# Patient Record
Sex: FEMALE | Race: WHITE | NOT HISPANIC OR LATINO | Employment: OTHER | URBAN - NONMETROPOLITAN AREA
[De-identification: names, ages, dates, MRNs, and addresses within clinical notes are randomized per-mention and may not be internally consistent; named-entity substitution may affect disease eponyms.]

---

## 2017-01-01 ENCOUNTER — APPOINTMENT (OUTPATIENT)
Dept: CT IMAGING | Facility: HOSPITAL | Age: 77
End: 2017-01-01

## 2017-01-01 ENCOUNTER — TELEPHONE (OUTPATIENT)
Dept: ONCOLOGY | Facility: HOSPITAL | Age: 77
End: 2017-01-01

## 2017-01-01 ENCOUNTER — INFUSION (OUTPATIENT)
Dept: ONCOLOGY | Facility: HOSPITAL | Age: 77
End: 2017-01-01

## 2017-01-01 ENCOUNTER — OFFICE VISIT (OUTPATIENT)
Dept: ONCOLOGY | Facility: CLINIC | Age: 77
End: 2017-01-01

## 2017-01-01 ENCOUNTER — OFFICE VISIT (OUTPATIENT)
Dept: SURGERY | Facility: CLINIC | Age: 77
End: 2017-01-01

## 2017-01-01 ENCOUNTER — APPOINTMENT (OUTPATIENT)
Dept: ONCOLOGY | Facility: HOSPITAL | Age: 77
End: 2017-01-01

## 2017-01-01 ENCOUNTER — HOSPITAL ENCOUNTER (INPATIENT)
Facility: HOSPITAL | Age: 77
LOS: 15 days | End: 2017-03-18
Attending: INTERNAL MEDICINE | Admitting: HOSPITALIST

## 2017-01-01 ENCOUNTER — TELEPHONE (OUTPATIENT)
Dept: ONCOLOGY | Facility: CLINIC | Age: 77
End: 2017-01-01

## 2017-01-01 ENCOUNTER — OFFICE VISIT (OUTPATIENT)
Dept: ENDOCRINOLOGY | Facility: CLINIC | Age: 77
End: 2017-01-01

## 2017-01-01 ENCOUNTER — HOSPITAL ENCOUNTER (OUTPATIENT)
Dept: OTHER | Facility: HOSPITAL | Age: 77
Discharge: HOME | End: 2017-01-12

## 2017-01-01 ENCOUNTER — TELEPHONE (OUTPATIENT)
Dept: NUTRITION | Facility: HOSPITAL | Age: 77
End: 2017-01-01

## 2017-01-01 ENCOUNTER — HOSPITAL ENCOUNTER (OUTPATIENT)
Dept: OTHER | Facility: HOSPITAL | Age: 77
Setting detail: RADIATION/ONCOLOGY SERIES
Discharge: HOME | End: 2017-01-20
Attending: INTERNAL MEDICINE | Admitting: INTERNAL MEDICINE

## 2017-01-01 ENCOUNTER — APPOINTMENT (OUTPATIENT)
Dept: GENERAL RADIOLOGY | Facility: HOSPITAL | Age: 77
End: 2017-01-01

## 2017-01-01 ENCOUNTER — HOSPITAL ENCOUNTER (INPATIENT)
Facility: HOSPITAL | Age: 77
LOS: 1 days | Discharge: HOSPICE | End: 2017-03-03
Attending: EMERGENCY MEDICINE | Admitting: INTERNAL MEDICINE

## 2017-01-01 VITALS
SYSTOLIC BLOOD PRESSURE: 130 MMHG | HEIGHT: 65 IN | BODY MASS INDEX: 36.65 KG/M2 | WEIGHT: 220 LBS | DIASTOLIC BLOOD PRESSURE: 60 MMHG

## 2017-01-01 VITALS
RESPIRATION RATE: 20 BRPM | SYSTOLIC BLOOD PRESSURE: 112 MMHG | BODY MASS INDEX: 34.01 KG/M2 | HEIGHT: 65 IN | TEMPERATURE: 97 F | WEIGHT: 204.1 LBS | HEART RATE: 84 BPM | DIASTOLIC BLOOD PRESSURE: 65 MMHG

## 2017-01-01 VITALS
HEART RATE: 83 BPM | SYSTOLIC BLOOD PRESSURE: 141 MMHG | HEIGHT: 65 IN | WEIGHT: 202 LBS | OXYGEN SATURATION: 91 % | RESPIRATION RATE: 18 BRPM | TEMPERATURE: 97.3 F | BODY MASS INDEX: 33.66 KG/M2 | DIASTOLIC BLOOD PRESSURE: 64 MMHG

## 2017-01-01 VITALS
WEIGHT: 216.5 LBS | HEART RATE: 186 BPM | BODY MASS INDEX: 36.03 KG/M2 | DIASTOLIC BLOOD PRESSURE: 67 MMHG | RESPIRATION RATE: 28 BRPM | OXYGEN SATURATION: 76 % | SYSTOLIC BLOOD PRESSURE: 136 MMHG | TEMPERATURE: 101.6 F

## 2017-01-01 VITALS
HEART RATE: 79 BPM | WEIGHT: 212 LBS | RESPIRATION RATE: 18 BRPM | DIASTOLIC BLOOD PRESSURE: 62 MMHG | BODY MASS INDEX: 35.32 KG/M2 | TEMPERATURE: 96.9 F | SYSTOLIC BLOOD PRESSURE: 111 MMHG | HEIGHT: 65 IN

## 2017-01-01 VITALS
HEIGHT: 65 IN | WEIGHT: 213.7 LBS | BODY MASS INDEX: 35.6 KG/M2 | DIASTOLIC BLOOD PRESSURE: 82 MMHG | SYSTOLIC BLOOD PRESSURE: 142 MMHG | HEART RATE: 84 BPM

## 2017-01-01 VITALS
TEMPERATURE: 96.6 F | WEIGHT: 215 LBS | SYSTOLIC BLOOD PRESSURE: 137 MMHG | HEIGHT: 65 IN | HEART RATE: 74 BPM | DIASTOLIC BLOOD PRESSURE: 60 MMHG | RESPIRATION RATE: 16 BRPM | BODY MASS INDEX: 35.82 KG/M2

## 2017-01-01 VITALS
WEIGHT: 213 LBS | HEIGHT: 65 IN | SYSTOLIC BLOOD PRESSURE: 124 MMHG | DIASTOLIC BLOOD PRESSURE: 60 MMHG | BODY MASS INDEX: 35.49 KG/M2

## 2017-01-01 VITALS
DIASTOLIC BLOOD PRESSURE: 63 MMHG | RESPIRATION RATE: 18 BRPM | SYSTOLIC BLOOD PRESSURE: 129 MMHG | HEART RATE: 83 BPM | TEMPERATURE: 97.1 F

## 2017-01-01 VITALS
BODY MASS INDEX: 33.75 KG/M2 | WEIGHT: 202.8 LBS | DIASTOLIC BLOOD PRESSURE: 63 MMHG | SYSTOLIC BLOOD PRESSURE: 122 MMHG | HEART RATE: 87 BPM | TEMPERATURE: 96.6 F | RESPIRATION RATE: 18 BRPM

## 2017-01-01 DIAGNOSIS — C18.3 MALIGNANT NEOPLASM OF HEPATIC FLEXURE (HCC): ICD-10-CM

## 2017-01-01 DIAGNOSIS — C18.3 MALIGNANT NEOPLASM OF HEPATIC FLEXURE (HCC): Primary | ICD-10-CM

## 2017-01-01 DIAGNOSIS — K92.2 LOWER GI BLEED: Primary | ICD-10-CM

## 2017-01-01 DIAGNOSIS — Z85.038 HISTORY OF MALIGNANT NEOPLASM OF COLON: ICD-10-CM

## 2017-01-01 DIAGNOSIS — T45.1X5A ANEMIA ASSOCIATED WITH CHEMOTHERAPY: ICD-10-CM

## 2017-01-01 DIAGNOSIS — R11.2 NON-INTRACTABLE VOMITING WITH NAUSEA, UNSPECIFIED VOMITING TYPE: ICD-10-CM

## 2017-01-01 DIAGNOSIS — C18.9 METASTATIC COLON CANCER IN FEMALE: ICD-10-CM

## 2017-01-01 DIAGNOSIS — E03.8 HYPOTHYROIDISM DUE TO HASHIMOTO'S THYROIDITIS: Primary | ICD-10-CM

## 2017-01-01 DIAGNOSIS — R11.2 NAUSEA AND VOMITING, INTRACTABILITY OF VOMITING NOT SPECIFIED, UNSPECIFIED VOMITING TYPE: Primary | ICD-10-CM

## 2017-01-01 DIAGNOSIS — C18.2 PRIMARY MALIGNANT NEOPLASM OF ASCENDING COLON (HCC): Primary | ICD-10-CM

## 2017-01-01 DIAGNOSIS — R11.2 NAUSEA AND VOMITING, INTRACTABILITY OF VOMITING NOT SPECIFIED, UNSPECIFIED VOMITING TYPE: ICD-10-CM

## 2017-01-01 DIAGNOSIS — E87.6 HYPOKALEMIA: ICD-10-CM

## 2017-01-01 DIAGNOSIS — Z78.9 IMPAIRED MOBILITY AND ACTIVITIES OF DAILY LIVING: ICD-10-CM

## 2017-01-01 DIAGNOSIS — R22.2 ABDOMINAL WALL MASS: Primary | ICD-10-CM

## 2017-01-01 DIAGNOSIS — E06.3 HYPOTHYROIDISM DUE TO HASHIMOTO'S THYROIDITIS: Primary | ICD-10-CM

## 2017-01-01 DIAGNOSIS — C18.9 METASTATIC COLON CANCER IN FEMALE: Primary | ICD-10-CM

## 2017-01-01 DIAGNOSIS — D64.81 ANEMIA ASSOCIATED WITH CHEMOTHERAPY: ICD-10-CM

## 2017-01-01 DIAGNOSIS — Z74.09 IMPAIRED MOBILITY AND ACTIVITIES OF DAILY LIVING: ICD-10-CM

## 2017-01-01 DIAGNOSIS — R53.1 GENERAL WEAKNESS: ICD-10-CM

## 2017-01-01 LAB
ALBUMIN SERPL-MCNC: 2.9 G/DL (ref 3.4–4.8)
ALBUMIN SERPL-MCNC: 2.9 G/DL (ref 3.4–4.8)
ALBUMIN SERPL-MCNC: 3.3 G/DL (ref 3.4–4.8)
ALBUMIN SERPL-MCNC: 3.9 G/DL (ref 3.4–4.8)
ALBUMIN SERPL-MCNC: 4 G/DL (ref 3.4–4.8)
ALBUMIN SERPL-MCNC: 4.1 G/DL (ref 3.4–4.8)
ALBUMIN SERPL-MCNC: 4.1 GM/DL (ref 3.4–4.8)
ALBUMIN SERPL-MCNC: 4.3 G/DL (ref 3.4–4.8)
ALBUMIN/GLOB SERPL: 1.2 G/DL (ref 1.1–1.8)
ALBUMIN/GLOB SERPL: 1.4 G/DL (ref 1.1–1.8)
ALBUMIN/GLOB SERPL: 1.4 G/DL (ref 1.1–1.8)
ALBUMIN/GLOB SERPL: 1.5 G/DL (ref 1.1–1.8)
ALBUMIN/GLOB SERPL: 1.6 G/DL (ref 1.1–1.8)
ALBUMIN/GLOB SERPL: 1.6 G/DL (ref 1.1–1.8)
ALBUMIN/GLOB SERPL: 1.7 G/DL (ref 1.1–1.8)
ALP SERPL-CCNC: 101 U/L (ref 38–126)
ALP SERPL-CCNC: 115 U/L (ref 38–126)
ALP SERPL-CCNC: 120 U/L (ref 38–126)
ALP SERPL-CCNC: 130 U/L (ref 38–126)
ALP SERPL-CCNC: 66 U/L (ref 38–126)
ALP SERPL-CCNC: 73 U/L (ref 38–126)
ALP SERPL-CCNC: 88 U/L (ref 38–126)
ALP SERPL-CCNC: 97 U/L (ref 38–126)
ALT SERPL W P-5'-P-CCNC: 22 U/L (ref 9–52)
ALT SERPL W P-5'-P-CCNC: 23 U/L (ref 9–52)
ALT SERPL W P-5'-P-CCNC: 25 U/L (ref 9–52)
ALT SERPL W P-5'-P-CCNC: 27 U/L (ref 9–52)
ALT SERPL W P-5'-P-CCNC: 28 U/L (ref 9–52)
ALT SERPL W P-5'-P-CCNC: 30 U/L (ref 9–52)
ALT SERPL W P-5'-P-CCNC: 33 U/L (ref 9–52)
ALT SERPL-CCNC: 37 U/L (ref 9–52)
ANION GAP SERPL CALCULATED.3IONS-SCNC: 11 MMOL/L (ref 5–15)
ANION GAP SERPL CALCULATED.3IONS-SCNC: 11 MMOL/L (ref 5–15)
ANION GAP SERPL CALCULATED.3IONS-SCNC: 12 MMOL/L (ref 5–15)
ANION GAP SERPL CALCULATED.3IONS-SCNC: 13 MMOL/L (ref 5–15)
ANION GAP SERPL CALCULATED.3IONS-SCNC: 13 MMOL/L (ref 5–15)
ANION GAP SERPL CALCULATED.3IONS-SCNC: 15 MMOL/L (ref 5–15)
ANION GAP SERPL CALCULATED.3IONS-SCNC: 5 MMOL/L (ref 5–15)
ANION GAP SERPL CALCULATED.3IONS-SCNC: 7 MMOL/L (ref 5–15)
ANION GAP SERPL CALCULATED.3IONS-SCNC: 8 MMOL/L (ref 5–15)
ANISOCYTOSIS BLD QL: NORMAL
AST SERPL-CCNC: 15 U/L (ref 14–36)
AST SERPL-CCNC: 18 U/L (ref 14–36)
AST SERPL-CCNC: 19 U/L (ref 14–36)
AST SERPL-CCNC: 19 U/L (ref 14–36)
AST SERPL-CCNC: 24 U/L (ref 14–36)
AST SERPL-CCNC: 25 U/L (ref 14–36)
AST SERPL-CCNC: 25 U/L (ref 14–36)
AST SERPL-CCNC: 31 U/L (ref 14–36)
BACTERIA SPEC AEROBE CULT: NORMAL
BACTERIA UR QL AUTO: ABNORMAL /HPF
BASOPHILS # BLD AUTO: 0.01 10*3/MM3 (ref 0–0.2)
BASOPHILS # BLD AUTO: 0.01 10*3/MM3 (ref 0–0.2)
BASOPHILS # BLD AUTO: 0.02 10*3/MM3 (ref 0–0.2)
BASOPHILS # BLD AUTO: 0.03 10*3/MM3 (ref 0–0.2)
BASOPHILS # BLD AUTO: 0.03 10*3/MM3 (ref 0–0.2)
BASOPHILS # BLD AUTO: 0.04 10*3/MM3 (ref 0–0.2)
BASOPHILS # BLD AUTO: 0.05 10*3/MM3 (ref 0–0.2)
BASOPHILS # BLD AUTO: 0.05 10*3/MM3 (ref 0–0.2)
BASOPHILS # BLD AUTO: 0.05 X1000/UL (ref 0–0.2)
BASOPHILS NFR BLD AUTO: 0.1 % (ref 0–2)
BASOPHILS NFR BLD AUTO: 0.1 % (ref 0–2)
BASOPHILS NFR BLD AUTO: 0.2 % (ref 0–2)
BASOPHILS NFR BLD AUTO: 0.3 % (ref 0–2)
BASOPHILS NFR BLD AUTO: 0.3 % (ref 0–2)
BASOPHILS NFR BLD AUTO: 0.5 % (ref 0–2)
BILIRUB SERPL-MCNC: 0.3 MG/DL (ref 0.2–1.3)
BILIRUB SERPL-MCNC: 0.5 MG/DL (ref 0.2–1.3)
BILIRUB SERPL-MCNC: 0.6 MG/DL (ref 0.2–1.3)
BILIRUB SERPL-MCNC: 0.7 MG/DL (ref 0.2–1.3)
BILIRUB SERPL-MCNC: 0.8 MG/DL (ref 0.2–1.3)
BILIRUB UR QL STRIP: ABNORMAL
BUN BLD-MCNC: 13 MG/DL (ref 7–21)
BUN BLD-MCNC: 13 MG/DL (ref 7–21)
BUN BLD-MCNC: 19 MG/DL (ref 7–21)
BUN BLD-MCNC: 20 MG/DL (ref 7–21)
BUN BLD-MCNC: 24 MG/DL (ref 7–21)
BUN BLD-MCNC: 25 MG/DL (ref 7–21)
BUN BLD-MCNC: 8 MG/DL (ref 7–21)
BUN BLD-MCNC: 9 MG/DL (ref 7–21)
BUN SERPL-MCNC: 14 MG/DL (ref 7–21)
BUN SERPL-MCNC: 15 MG/DL (ref 7–21)
BUN/CREAT SERPL: 14.1 (ref 7–25)
BUN/CREAT SERPL: 14.8 (ref 7–25)
BUN/CREAT SERPL: 16.3 (ref 7–25)
BUN/CREAT SERPL: 16.5 (ref 7–25)
BUN/CREAT SERPL: 27.5 (ref 7–25)
BUN/CREAT SERPL: 28.6 (ref 7–25)
BUN/CREAT SERPL: 28.6 (ref 7–25)
BUN/CREAT SERPL: 33.3 (ref 7–25)
CALCIUM SERPL-MCNC: 9.4 MG/DL (ref 8.4–10.2)
CALCIUM SPEC-SCNC: 8.2 MG/DL (ref 8.4–10.2)
CALCIUM SPEC-SCNC: 8.4 MG/DL (ref 8.4–10.2)
CALCIUM SPEC-SCNC: 8.6 MG/DL (ref 8.4–10.2)
CALCIUM SPEC-SCNC: 8.7 MG/DL (ref 8.4–10.2)
CALCIUM SPEC-SCNC: 9.3 MG/DL (ref 8.4–10.2)
CALCIUM SPEC-SCNC: 9.4 MG/DL (ref 8.4–10.2)
CEA SERPL-MCNC: 11.7 NG/ML (ref 0–5)
CHLORIDE SERPL-SCNC: 103 MMOL/L (ref 95–110)
CHLORIDE SERPL-SCNC: 103 MMOL/L (ref 95–110)
CHLORIDE SERPL-SCNC: 91 MMOL/L (ref 95–110)
CHLORIDE SERPL-SCNC: 92 MMOL/L (ref 95–110)
CHLORIDE SERPL-SCNC: 93 MMOL/L (ref 95–110)
CHLORIDE SERPL-SCNC: 94 MMOL/L (ref 95–110)
CHLORIDE SERPL-SCNC: 96 MMOL/L (ref 95–110)
CHLORIDE SERPL-SCNC: 97 MMOL/L (ref 95–110)
CHLORIDE SERPL-SCNC: 98 MMOL/L (ref 95–110)
CLARITY UR: CLEAR
CO2 SERPL-SCNC: 24 MMOL/L (ref 22–31)
CO2 SERPL-SCNC: 24 MMOL/L (ref 22–31)
CO2 SERPL-SCNC: 26 MMOL/L (ref 22–31)
CO2 SERPL-SCNC: 27 MMOL/L (ref 22–31)
CO2 SERPL-SCNC: 28 MMOL/L (ref 22–31)
CO2 SERPL-SCNC: 30 MMOL/L (ref 22–31)
CO2 SERPL-SCNC: 31 MMOL/L (ref 22–31)
COLOR UR: ABNORMAL
CONV AUTO IG#: 0.05 X1000/UL (ref 0.01–0.02)
CONV AUTO IG%: 0.5 % (ref 0–0.5)
CREAT BLD-MCNC: 0.54 MG/DL (ref 0.5–1)
CREAT BLD-MCNC: 0.64 MG/DL (ref 0.5–1)
CREAT BLD-MCNC: 0.69 MG/DL (ref 0.5–1)
CREAT BLD-MCNC: 0.7 MG/DL (ref 0.5–1)
CREAT BLD-MCNC: 0.75 MG/DL (ref 0.5–1)
CREAT BLD-MCNC: 0.79 MG/DL (ref 0.5–1)
CREAT BLD-MCNC: 0.8 MG/DL (ref 0.5–1)
CREAT BLD-MCNC: 0.84 MG/DL (ref 0.5–1)
CREAT SERPL-MCNC: 1 MG/DL (ref 0.5–1)
D-LACTATE SERPL-SCNC: 1 MMOL/L (ref 0.5–2)
DEPRECATED RDW RBC AUTO: 44.2 FL (ref 36.4–46.3)
DEPRECATED RDW RBC AUTO: 44.5 FL (ref 36.4–46.3)
DEPRECATED RDW RBC AUTO: 45.2 FL (ref 36.4–46.3)
DEPRECATED RDW RBC AUTO: 45.4 FL (ref 36.4–46.3)
DEPRECATED RDW RBC AUTO: 45.5 FL (ref 36.4–46.3)
DEPRECATED RDW RBC AUTO: 45.9 FL (ref 36.4–46.3)
DEPRECATED RDW RBC AUTO: 46.8 FL (ref 36.4–46.3)
DEPRECATED RDW RBC AUTO: 47.2 FL (ref 36.4–46.3)
EOSINOPHIL # BLD AUTO: 0.12 10*3/MM3 (ref 0–0.7)
EOSINOPHIL # BLD AUTO: 0.13 10*3/MM3 (ref 0–0.7)
EOSINOPHIL # BLD AUTO: 0.19 10*3/MM3 (ref 0–0.7)
EOSINOPHIL # BLD AUTO: 0.25 10*3/MM3 (ref 0–0.7)
EOSINOPHIL # BLD AUTO: 0.25 10*3/MM3 (ref 0–0.7)
EOSINOPHIL # BLD AUTO: 0.26 10*3/MM3 (ref 0–0.7)
EOSINOPHIL # BLD AUTO: 0.44 X1000/UL (ref 0–0.7)
EOSINOPHIL # BLD AUTO: 0.45 10*3/MM3 (ref 0–0.7)
EOSINOPHIL # BLD AUTO: 0.48 10*3/MM3 (ref 0–0.7)
EOSINOPHIL NFR BLD AUTO: 1.1 % (ref 0–7)
EOSINOPHIL NFR BLD AUTO: 1.5 % (ref 0–7)
EOSINOPHIL NFR BLD AUTO: 1.6 % (ref 0–7)
EOSINOPHIL NFR BLD AUTO: 2.1 % (ref 0–7)
EOSINOPHIL NFR BLD AUTO: 2.5 % (ref 0–7)
EOSINOPHIL NFR BLD AUTO: 3.8 % (ref 0–7)
EOSINOPHIL NFR BLD AUTO: 4.3 % (ref 0–7)
EOSINOPHIL NFR BLD AUTO: 4.4 % (ref 0–7)
EOSINOPHIL NFR BLD AUTO: 5.5 % (ref 0–7)
ERYTHROCYTE [DISTWIDTH] IN BLOOD BY AUTOMATED COUNT: 14.8 % (ref 11.5–14.5)
ERYTHROCYTE [DISTWIDTH] IN BLOOD BY AUTOMATED COUNT: 14.9 % (ref 11.5–14.5)
ERYTHROCYTE [DISTWIDTH] IN BLOOD BY AUTOMATED COUNT: 15.1 % (ref 11.5–14.5)
ERYTHROCYTE [DISTWIDTH] IN BLOOD BY AUTOMATED COUNT: 15.1 % (ref 11.5–14.5)
ERYTHROCYTE [DISTWIDTH] IN BLOOD BY AUTOMATED COUNT: 15.2 % (ref 11.5–14.5)
ERYTHROCYTE [DISTWIDTH] IN BLOOD BY AUTOMATED COUNT: 15.2 % (ref 11.5–14.5)
ERYTHROCYTE [DISTWIDTH] IN BLOOD BY AUTOMATED COUNT: 15.3 % (ref 11.5–14.5)
ERYTHROCYTE [DISTWIDTH] IN BLOOD BY AUTOMATED COUNT: 15.3 % (ref 11.5–14.5)
ERYTHROCYTE [DISTWIDTH] IN BLOOD: 15.2 % (ref 11.5–14.5)
FERRITIN SERPL IA-MCNC: 43.1 NG/ML (ref 11.1–264)
FERRITIN SERPL-MCNC: 51.2 NG/ML (ref 11.1–264)
FLUAV AG NPH QL: NEGATIVE
FLUBV AG NPH QL IA: NEGATIVE
FOLATE SERPL-MCNC: 9.95 NG/ML (ref 2.76–21)
GFR SERPL CREATININE-BSD FRML MDRD: 110 ML/MIN/1.73 (ref 60–90)
GFR SERPL CREATININE-BSD FRML MDRD: 66 ML/MIN/1.73 (ref 39–90)
GFR SERPL CREATININE-BSD FRML MDRD: 70 ML/MIN/1.73 (ref 60–90)
GFR SERPL CREATININE-BSD FRML MDRD: 71 ML/MIN/1.73 (ref 39–90)
GFR SERPL CREATININE-BSD FRML MDRD: 75 ML/MIN/1.73 (ref 39–90)
GFR SERPL CREATININE-BSD FRML MDRD: 82 ML/MIN/1.73 (ref 60–90)
GFR SERPL CREATININE-BSD FRML MDRD: 83 ML/MIN/1.73 (ref 60–90)
GFR SERPL CREATININE-BSD FRML MDRD: 90 ML/MIN/1.73 (ref 39–90)
GLOBULIN UR ELPH-MCNC: 2 GM/DL (ref 2.3–3.5)
GLOBULIN UR ELPH-MCNC: 2.1 GM/DL (ref 2.3–3.5)
GLOBULIN UR ELPH-MCNC: 2.4 GM/DL (ref 2.3–3.5)
GLOBULIN UR ELPH-MCNC: 2.5 GM/DL (ref 2.3–3.5)
GLOBULIN UR ELPH-MCNC: 2.5 GM/DL (ref 2.3–3.5)
GLOBULIN UR ELPH-MCNC: 2.7 GM/DL (ref 2.3–3.5)
GLOBULIN UR ELPH-MCNC: 2.9 GM/DL (ref 2.3–3.5)
GLUCOSE BLD-MCNC: 118 MG/DL (ref 60–100)
GLUCOSE BLD-MCNC: 133 MG/DL (ref 60–100)
GLUCOSE BLD-MCNC: 134 MG/DL (ref 60–100)
GLUCOSE BLD-MCNC: 150 MG/DL (ref 60–100)
GLUCOSE BLD-MCNC: 82 MG/DL (ref 60–100)
GLUCOSE BLD-MCNC: 82 MG/DL (ref 60–100)
GLUCOSE BLD-MCNC: 90 MG/DL (ref 60–100)
GLUCOSE BLD-MCNC: 97 MG/DL (ref 60–100)
GLUCOSE BLDC GLUCOMTR-MCNC: 122 MG/DL (ref 70–130)
GLUCOSE SERPL-MCNC: 102 MG/DL (ref 60–100)
GLUCOSE UR STRIP-MCNC: NEGATIVE MG/DL
GRANULOCYTES # BLD AUTO: 6.86 X1000/UL (ref 2–8.6)
GRANULOCYTES NFR BLD AUTO: 66.4 % (ref 37–80)
HCT VFR BLD AUTO: 25.4 % (ref 35–45)
HCT VFR BLD AUTO: 26 % (ref 35–45)
HCT VFR BLD AUTO: 27.2 % (ref 35–45)
HCT VFR BLD AUTO: 27.2 % (ref 35–45)
HCT VFR BLD AUTO: 27.4 % (ref 35–45)
HCT VFR BLD AUTO: 27.5 % (ref 35–45)
HCT VFR BLD AUTO: 31.6 % (ref 35–45)
HCT VFR BLD AUTO: 35.2 % (ref 35–45)
HCT VFR BLD AUTO: 35.7 % (ref 35–45)
HCT VFR BLD AUTO: 35.8 % (ref 35–45)
HCT VFR BLD AUTO: 36.6 % (ref 35–45)
HCT VFR BLD CALC: 36.2 % (ref 35–45)
HGB BLD-MCNC: 10.8 G/DL (ref 12–15.5)
HGB BLD-MCNC: 11.9 G/DL (ref 12–15.5)
HGB BLD-MCNC: 12 G/DL (ref 12–15.5)
HGB BLD-MCNC: 12.1 G/DL (ref 12–15.5)
HGB BLD-MCNC: 12.1 G/DL (ref 12–15.5)
HGB BLD-MCNC: 12.2 GM/DL (ref 12–15.5)
HGB BLD-MCNC: 8.7 G/DL (ref 12–15.5)
HGB BLD-MCNC: 8.7 G/DL (ref 12–15.5)
HGB BLD-MCNC: 9.1 G/DL (ref 12–15.5)
HGB BLD-MCNC: 9.3 G/DL (ref 12–15.5)
HGB UR QL STRIP.AUTO: NEGATIVE
HOLD SPECIMEN: NORMAL
HOLD SPECIMEN: NORMAL
HYALINE CASTS UR QL AUTO: ABNORMAL /LPF
HYPOCHROMIA BLD QL: NORMAL
IMM GRANULOCYTES # BLD: 0.02 10*3/MM3 (ref 0–0.02)
IMM GRANULOCYTES # BLD: 0.04 10*3/MM3 (ref 0–0.02)
IMM GRANULOCYTES # BLD: 0.08 10*3/MM3 (ref 0–0.02)
IMM GRANULOCYTES # BLD: 0.08 10*3/MM3 (ref 0–0.02)
IMM GRANULOCYTES NFR BLD: 0.2 % (ref 0–0.5)
IMM GRANULOCYTES NFR BLD: 0.4 % (ref 0–0.5)
IMM GRANULOCYTES NFR BLD: 0.4 % (ref 0–0.5)
IMM GRANULOCYTES NFR BLD: 0.5 % (ref 0–0.5)
IMM GRANULOCYTES NFR BLD: 0.6 % (ref 0–0.5)
IMM GRANULOCYTES NFR BLD: 0.7 % (ref 0–0.5)
INR PPP: 1.26 (ref 0.8–1.2)
IRON 24H UR-MRATE: 16 MCG/DL (ref 37–170)
IRON SATN MFR SERPL: 11.7 % (ref 15–50)
IRON SATN MFR SERPL: 6 % (ref 15–50)
IRON SERPL-MCNC: 42 UG/DL (ref 37–170)
KETONES UR QL STRIP: NEGATIVE
LEUKOCYTE ESTERASE UR QL STRIP.AUTO: ABNORMAL
LYMPHOCYTES # BLD AUTO: 0.84 10*3/MM3 (ref 0.6–4.2)
LYMPHOCYTES # BLD AUTO: 1.13 10*3/MM3 (ref 0.6–4.2)
LYMPHOCYTES # BLD AUTO: 1.15 10*3/MM3 (ref 0.6–4.2)
LYMPHOCYTES # BLD AUTO: 1.32 10*3/MM3 (ref 0.6–4.2)
LYMPHOCYTES # BLD AUTO: 1.42 10*3/MM3 (ref 0.6–4.2)
LYMPHOCYTES # BLD AUTO: 1.46 10*3/MM3 (ref 0.6–4.2)
LYMPHOCYTES # BLD AUTO: 1.51 10*3/MM3 (ref 0.6–4.2)
LYMPHOCYTES # BLD AUTO: 1.68 10*3/MM3 (ref 0.6–4.2)
LYMPHOCYTES # BLD AUTO: 1.87 X1000/UL (ref 0.6–4.2)
LYMPHOCYTES NFR BLD AUTO: 10.7 % (ref 10–50)
LYMPHOCYTES NFR BLD AUTO: 12.4 % (ref 10–50)
LYMPHOCYTES NFR BLD AUTO: 14.3 % (ref 10–50)
LYMPHOCYTES NFR BLD AUTO: 15.2 % (ref 10–50)
LYMPHOCYTES NFR BLD AUTO: 16.1 % (ref 10–50)
LYMPHOCYTES NFR BLD AUTO: 16.2 % (ref 10–50)
LYMPHOCYTES NFR BLD AUTO: 17 % (ref 10–50)
LYMPHOCYTES NFR BLD AUTO: 18.1 % (ref 10–50)
LYMPHOCYTES NFR BLD AUTO: 7.4 % (ref 10–50)
MCH RBC QN AUTO: 27.8 PG (ref 26.5–34)
MCH RBC QN AUTO: 27.9 PG (ref 26.5–34)
MCH RBC QN AUTO: 28.1 PG (ref 26.5–34)
MCH RBC QN AUTO: 28.3 PG (ref 26.5–34)
MCH RBC QN: 27.9 PG (ref 26–34)
MCHC RBC AUTO-ENTMCNC: 33.1 G/DL (ref 31.4–36)
MCHC RBC AUTO-ENTMCNC: 33.2 G/DL (ref 31.4–36)
MCHC RBC AUTO-ENTMCNC: 33.6 G/DL (ref 31.4–36)
MCHC RBC AUTO-ENTMCNC: 33.8 G/DL (ref 31.4–36)
MCHC RBC AUTO-ENTMCNC: 33.8 G/DL (ref 31.4–36)
MCHC RBC AUTO-ENTMCNC: 34.2 G/DL (ref 31.4–36)
MCHC RBC AUTO-ENTMCNC: 34.2 G/DL (ref 31.4–36)
MCHC RBC AUTO-ENTMCNC: 34.3 G/DL (ref 31.4–36)
MCHC RBC-ENTMCNC: 33.7 GM/DL (ref 31.4–36)
MCV RBC AUTO: 81.4 FL (ref 80–98)
MCV RBC AUTO: 81.4 FL (ref 80–98)
MCV RBC AUTO: 82.4 FL (ref 80–98)
MCV RBC AUTO: 82.5 FL (ref 80–98)
MCV RBC AUTO: 82.7 FL (ref 80–98)
MCV RBC AUTO: 83.6 FL (ref 80–98)
MCV RBC AUTO: 83.9 FL (ref 80–98)
MCV RBC AUTO: 84 FL (ref 80–98)
MCV RBC: 82.8 FL (ref 80–98)
MONOCYTES # BLD AUTO: 0.75 10*3/MM3 (ref 0–0.9)
MONOCYTES # BLD AUTO: 0.79 10*3/MM3 (ref 0–0.9)
MONOCYTES # BLD AUTO: 0.86 10*3/MM3 (ref 0–0.9)
MONOCYTES # BLD AUTO: 0.89 10*3/MM3 (ref 0–0.9)
MONOCYTES # BLD AUTO: 1.05 X1000/UL (ref 0–0.9)
MONOCYTES # BLD AUTO: 1.16 10*3/MM3 (ref 0–0.9)
MONOCYTES # BLD AUTO: 1.27 10*3/MM3 (ref 0–0.9)
MONOCYTES # BLD AUTO: 1.35 10*3/MM3 (ref 0–0.9)
MONOCYTES # BLD AUTO: 1.54 10*3/MM3 (ref 0–0.9)
MONOCYTES NFR BLD AUTO: 10.2 % (ref 0–12)
MONOCYTES NFR BLD AUTO: 11.1 % (ref 0–12)
MONOCYTES NFR BLD AUTO: 11.3 % (ref 0–12)
MONOCYTES NFR BLD AUTO: 11.9 % (ref 0–12)
MONOCYTES NFR BLD AUTO: 13.5 % (ref 0–12)
MONOCYTES NFR BLD AUTO: 14.2 % (ref 0–12)
MONOCYTES NFR BLD AUTO: 8.1 % (ref 0–12)
MONOCYTES NFR BLD AUTO: 8.6 % (ref 0–12)
MONOCYTES NFR BLD AUTO: 8.7 % (ref 0–12)
NEUTROPHILS # BLD AUTO: 12.35 10*3/MM3 (ref 2–8.6)
NEUTROPHILS # BLD AUTO: 4.42 10*3/MM3 (ref 2–8.6)
NEUTROPHILS # BLD AUTO: 5.33 10*3/MM3 (ref 2–8.6)
NEUTROPHILS # BLD AUTO: 5.54 10*3/MM3 (ref 2–8.6)
NEUTROPHILS # BLD AUTO: 6.04 10*3/MM3 (ref 2–8.6)
NEUTROPHILS # BLD AUTO: 7.35 10*3/MM3 (ref 2–8.6)
NEUTROPHILS # BLD AUTO: 8.85 10*3/MM3 (ref 2–8.6)
NEUTROPHILS # BLD AUTO: 8.91 10*3/MM3 (ref 2–8.6)
NEUTROPHILS NFR BLD AUTO: 66.2 % (ref 37–80)
NEUTROPHILS NFR BLD AUTO: 67.6 % (ref 37–80)
NEUTROPHILS NFR BLD AUTO: 69 % (ref 37–80)
NEUTROPHILS NFR BLD AUTO: 70.2 % (ref 37–80)
NEUTROPHILS NFR BLD AUTO: 71.7 % (ref 37–80)
NEUTROPHILS NFR BLD AUTO: 73.5 % (ref 37–80)
NEUTROPHILS NFR BLD AUTO: 77.5 % (ref 37–80)
NEUTROPHILS NFR BLD AUTO: 78.8 % (ref 37–80)
NITRITE UR QL STRIP: NEGATIVE
NRBC BLD MANUAL-RTO: 0 /100 WBC (ref 0–0)
PH UR STRIP.AUTO: 6 [PH] (ref 5–9)
PLATELET # BLD AUTO: 182 10*3/MM3 (ref 150–450)
PLATELET # BLD AUTO: 193 10*3/MM3 (ref 150–450)
PLATELET # BLD AUTO: 206 10*3/MM3 (ref 150–450)
PLATELET # BLD AUTO: 207 10*3/MM3 (ref 150–450)
PLATELET # BLD AUTO: 210 10*3/MM3 (ref 150–450)
PLATELET # BLD AUTO: 220 10*3/MM3 (ref 150–450)
PLATELET # BLD AUTO: 232 10*3/MM3 (ref 150–450)
PLATELET # BLD AUTO: 248 10*3/MM3 (ref 150–450)
PLATELET # BLD: 185 X1000/MM3 (ref 150–450)
PMV BLD AUTO: 8.5 FL (ref 8–12)
PMV BLD AUTO: 8.9 FL (ref 8–12)
PMV BLD AUTO: 9 FL (ref 8–12)
PMV BLD AUTO: 9.1 FL (ref 8–12)
PMV BLD AUTO: 9.1 FL (ref 8–12)
PMV BLD AUTO: 9.4 FL (ref 8–12)
PMV BLD AUTO: 9.5 FL (ref 8–12)
PMV BLD AUTO: 9.6 FL (ref 8–12)
PMV BLD: 9.6 FL (ref 8–12)
POTASSIUM BLD-SCNC: 3.1 MMOL/L (ref 3.5–5.1)
POTASSIUM BLD-SCNC: 3.1 MMOL/L (ref 3.5–5.1)
POTASSIUM BLD-SCNC: 3.3 MMOL/L (ref 3.5–5.1)
POTASSIUM BLD-SCNC: 3.4 MMOL/L (ref 3.5–5.1)
POTASSIUM BLD-SCNC: 3.6 MMOL/L (ref 3.5–5.1)
POTASSIUM BLD-SCNC: 3.8 MMOL/L (ref 3.5–5.1)
POTASSIUM BLD-SCNC: 3.8 MMOL/L (ref 3.5–5.1)
POTASSIUM BLD-SCNC: 4 MMOL/L (ref 3.5–5.1)
POTASSIUM SERPL-SCNC: 3.5 MMOL/L (ref 3.5–5.1)
PROT SERPL-MCNC: 4.9 G/DL (ref 6.3–8.6)
PROT SERPL-MCNC: 5.3 G/DL (ref 6.3–8.6)
PROT SERPL-MCNC: 5.4 G/DL (ref 6.3–8.6)
PROT SERPL-MCNC: 6.5 G/DL (ref 6.3–8.6)
PROT SERPL-MCNC: 6.6 G/DL (ref 6.3–8.6)
PROT SERPL-MCNC: 6.8 G/DL (ref 6.3–8.6)
PROT SERPL-MCNC: 6.8 GM/DL (ref 6.3–8.6)
PROT SERPL-MCNC: 7 G/DL (ref 6.3–8.6)
PROT UR QL STRIP: NEGATIVE
PROTHROMBIN TIME: 15.7 SECONDS (ref 11.1–15.3)
RBC # BLD AUTO: 3.07 10*6/MM3 (ref 3.77–5.16)
RBC # BLD AUTO: 3.26 10*6/MM3 (ref 3.77–5.16)
RBC # BLD AUTO: 3.34 10*6/MM3 (ref 3.77–5.16)
RBC # BLD AUTO: 3.88 10*6/MM3 (ref 3.77–5.16)
RBC # BLD AUTO: 4.27 10*6/MM3 (ref 3.77–5.16)
RBC # BLD AUTO: 4.27 10*6/MM3 (ref 3.77–5.16)
RBC # BLD AUTO: 4.34 10*6/MM3 (ref 3.77–5.16)
RBC # BLD AUTO: 4.36 10*6/MM3 (ref 3.77–5.16)
RBC # BLD: 4.37 MEGA/MM3 (ref 3.77–5.16)
RBC # UR: ABNORMAL /HPF
REF LAB TEST METHOD: ABNORMAL
SMALL PLATELETS BLD QL SMEAR: ADEQUATE
SODIUM BLD-SCNC: 129 MMOL/L (ref 137–145)
SODIUM BLD-SCNC: 130 MMOL/L (ref 137–145)
SODIUM BLD-SCNC: 133 MMOL/L (ref 137–145)
SODIUM BLD-SCNC: 134 MMOL/L (ref 137–145)
SODIUM BLD-SCNC: 134 MMOL/L (ref 137–145)
SODIUM BLD-SCNC: 135 MMOL/L (ref 137–145)
SODIUM BLD-SCNC: 136 MMOL/L (ref 137–145)
SODIUM BLD-SCNC: 138 MMOL/L (ref 137–145)
SODIUM SERPL-SCNC: 137 MMOL/L (ref 137–145)
SP GR UR STRIP: 1.03 (ref 1–1.03)
SQUAMOUS #/AREA URNS HPF: ABNORMAL /HPF
T4 FREE SERPL-MCNC: 0.76 NG/DL (ref 0.78–2.19)
T4 FREE SERPL-MCNC: 1.34 NG/DL (ref 0.78–2.19)
TIBC SERPL-MCNC: 249 MCG/DL (ref 265–497)
TIBC SERPL-MCNC: 359 UG/DL (ref 265–497)
TROPONIN I SERPL-MCNC: <0.012 NG/ML
TSH SERPL DL<=0.05 MIU/L-ACNC: 19.6 MIU/ML (ref 0.46–4.68)
TSH SERPL DL<=0.05 MIU/L-ACNC: 28.8 MIU/ML (ref 0.46–4.68)
TSH SERPL DL<=0.05 MIU/L-ACNC: 31.4 MIU/ML (ref 0.46–4.68)
UROBILINOGEN UR QL STRIP: ABNORMAL
VIT B12 BLD-MCNC: 341 PG/ML (ref 239–931)
WBC # BLD: 10.3 X1000/UL (ref 3.2–9.8)
WBC MORPH BLD: NORMAL
WBC NRBC COR # BLD: 10.24 10*3/MM3 (ref 3.2–9.8)
WBC NRBC COR # BLD: 11.41 10*3/MM3 (ref 3.2–9.8)
WBC NRBC COR # BLD: 12.14 10*3/MM3 (ref 3.2–9.8)
WBC NRBC COR # BLD: 15.68 10*3/MM3 (ref 3.2–9.8)
WBC NRBC COR # BLD: 6.66 10*3/MM3 (ref 3.2–9.8)
WBC NRBC COR # BLD: 7.59 10*3/MM3 (ref 3.2–9.8)
WBC NRBC COR # BLD: 8.19 10*3/MM3 (ref 3.2–9.8)
WBC NRBC COR # BLD: 8.75 10*3/MM3 (ref 3.2–9.8)
WBC UR QL AUTO: ABNORMAL /HPF
WHOLE BLOOD HOLD SPECIMEN: NORMAL
WHOLE BLOOD HOLD SPECIMEN: NORMAL

## 2017-01-01 PROCEDURE — G0378 HOSPITAL OBSERVATION PER HR: HCPCS

## 2017-01-01 PROCEDURE — 25010000002 ONDANSETRON PER 1 MG: Performed by: INTERNAL MEDICINE

## 2017-01-01 PROCEDURE — 25010000002 HYDROMORPHONE PER 4 MG: Performed by: HOSPITALIST

## 2017-01-01 PROCEDURE — 96367 TX/PROPH/DG ADDL SEQ IV INF: CPT

## 2017-01-01 PROCEDURE — 25010000002 DIPHENHYDRAMINE PER 50 MG: Performed by: NURSE PRACTITIONER

## 2017-01-01 PROCEDURE — 82607 VITAMIN B-12: CPT | Performed by: INTERNAL MEDICINE

## 2017-01-01 PROCEDURE — 25010000002 NIVOLUMAB 100 MG/10ML SOLUTION 10 ML VIAL: Performed by: INTERNAL MEDICINE

## 2017-01-01 PROCEDURE — 25010000002 ONDANSETRON PER 1 MG: Performed by: EMERGENCY MEDICINE

## 2017-01-01 PROCEDURE — 25010000002 DIAZEPAM PER 5 MG: Performed by: NURSE PRACTITIONER

## 2017-01-01 PROCEDURE — 82728 ASSAY OF FERRITIN: CPT | Performed by: INTERNAL MEDICINE

## 2017-01-01 PROCEDURE — 85014 HEMATOCRIT: CPT | Performed by: HOSPITALIST

## 2017-01-01 PROCEDURE — 25010000002 LORAZEPAM PER 2 MG: Performed by: INTERNAL MEDICINE

## 2017-01-01 PROCEDURE — 25010000002 HYDROMORPHONE PCA 0.2 MG/ML PCA 30 ML (BHMAD/RIC): Performed by: NURSE PRACTITIONER

## 2017-01-01 PROCEDURE — 85014 HEMATOCRIT: CPT | Performed by: INTERNAL MEDICINE

## 2017-01-01 PROCEDURE — 94799 UNLISTED PULMONARY SVC/PX: CPT

## 2017-01-01 PROCEDURE — 25010000002 MORPHINE SULFATE (PF) 2 MG/ML SOLUTION: Performed by: NURSE PRACTITIONER

## 2017-01-01 PROCEDURE — 96413 CHEMO IV INFUSION 1 HR: CPT | Performed by: INTERNAL MEDICINE

## 2017-01-01 PROCEDURE — 84439 ASSAY OF FREE THYROXINE: CPT | Performed by: INTERNAL MEDICINE

## 2017-01-01 PROCEDURE — 80053 COMPREHEN METABOLIC PANEL: CPT | Performed by: INTERNAL MEDICINE

## 2017-01-01 PROCEDURE — 99204 OFFICE O/P NEW MOD 45 MIN: CPT | Performed by: INTERNAL MEDICINE

## 2017-01-01 PROCEDURE — 87040 BLOOD CULTURE FOR BACTERIA: CPT | Performed by: EMERGENCY MEDICINE

## 2017-01-01 PROCEDURE — G8979 MOBILITY GOAL STATUS: HCPCS

## 2017-01-01 PROCEDURE — 25010000002 HYDROMORPHONE PCA 0.2 MG/ML PCA 30 ML (BHMAD/RIC): Performed by: INTERNAL MEDICINE

## 2017-01-01 PROCEDURE — 84484 ASSAY OF TROPONIN QUANT: CPT | Performed by: EMERGENCY MEDICINE

## 2017-01-01 PROCEDURE — 25010000002 LORAZEPAM PER 2 MG: Performed by: NURSE PRACTITIONER

## 2017-01-01 PROCEDURE — 85025 COMPLETE CBC W/AUTO DIFF WBC: CPT | Performed by: INTERNAL MEDICINE

## 2017-01-01 PROCEDURE — 71260 CT THORAX DX C+: CPT

## 2017-01-01 PROCEDURE — 25010000002 MORPHINE SULFATE (PF) 2 MG/ML SOLUTION: Performed by: HOSPITALIST

## 2017-01-01 PROCEDURE — 94760 N-INVAS EAR/PLS OXIMETRY 1: CPT

## 2017-01-01 PROCEDURE — 85018 HEMOGLOBIN: CPT | Performed by: HOSPITALIST

## 2017-01-01 PROCEDURE — 99285 EMERGENCY DEPT VISIT HI MDM: CPT

## 2017-01-01 PROCEDURE — 83540 ASSAY OF IRON: CPT | Performed by: INTERNAL MEDICINE

## 2017-01-01 PROCEDURE — 36415 COLL VENOUS BLD VENIPUNCTURE: CPT

## 2017-01-01 PROCEDURE — 84443 ASSAY THYROID STIM HORMONE: CPT | Performed by: INTERNAL MEDICINE

## 2017-01-01 PROCEDURE — 96361 HYDRATE IV INFUSION ADD-ON: CPT

## 2017-01-01 PROCEDURE — 97116 GAIT TRAINING THERAPY: CPT

## 2017-01-01 PROCEDURE — 82746 ASSAY OF FOLIC ACID SERUM: CPT | Performed by: INTERNAL MEDICINE

## 2017-01-01 PROCEDURE — 85007 BL SMEAR W/DIFF WBC COUNT: CPT | Performed by: EMERGENCY MEDICINE

## 2017-01-01 PROCEDURE — 99214 OFFICE O/P EST MOD 30 MIN: CPT | Performed by: INTERNAL MEDICINE

## 2017-01-01 PROCEDURE — 25010000002 NA FERRIC GLUC CPLX PER 12.5 MG: Performed by: INTERNAL MEDICINE

## 2017-01-01 PROCEDURE — 25010000002 HYDROMORPHONE PER 4 MG: Performed by: FAMILY MEDICINE

## 2017-01-01 PROCEDURE — 80053 COMPREHEN METABOLIC PANEL: CPT | Performed by: EMERGENCY MEDICINE

## 2017-01-01 PROCEDURE — 25010000002 ONDANSETRON PER 1 MG: Performed by: NURSE PRACTITIONER

## 2017-01-01 PROCEDURE — 96365 THER/PROPH/DIAG IV INF INIT: CPT | Performed by: INTERNAL MEDICINE

## 2017-01-01 PROCEDURE — 85025 COMPLETE CBC W/AUTO DIFF WBC: CPT | Performed by: EMERGENCY MEDICINE

## 2017-01-01 PROCEDURE — 96361 HYDRATE IV INFUSION ADD-ON: CPT | Performed by: INTERNAL MEDICINE

## 2017-01-01 PROCEDURE — 80048 BASIC METABOLIC PNL TOTAL CA: CPT | Performed by: INTERNAL MEDICINE

## 2017-01-01 PROCEDURE — 82962 GLUCOSE BLOOD TEST: CPT

## 2017-01-01 PROCEDURE — G8989 SELF CARE D/C STATUS: HCPCS

## 2017-01-01 PROCEDURE — 25010000002 HEPARIN FLUSH (PORCINE) 100 UNIT/ML SOLUTION: Performed by: INTERNAL MEDICINE

## 2017-01-01 PROCEDURE — 25010000002 LORAZEPAM PER 2 MG: Performed by: FAMILY MEDICINE

## 2017-01-01 PROCEDURE — 25010000002 PROMETHAZINE PER 50 MG: Performed by: NURSE PRACTITIONER

## 2017-01-01 PROCEDURE — 25010000002 DEXAMETHASONE PER 1 MG: Performed by: INTERNAL MEDICINE

## 2017-01-01 PROCEDURE — 71010 HC CHEST PA OR AP: CPT

## 2017-01-01 PROCEDURE — 96360 HYDRATION IV INFUSION INIT: CPT

## 2017-01-01 PROCEDURE — 80053 COMPREHEN METABOLIC PANEL: CPT | Performed by: HOSPITALIST

## 2017-01-01 PROCEDURE — 84443 ASSAY THYROID STIM HORMONE: CPT | Performed by: HOSPITALIST

## 2017-01-01 PROCEDURE — G8978 MOBILITY CURRENT STATUS: HCPCS

## 2017-01-01 PROCEDURE — 63710000001: Performed by: INTERNAL MEDICINE

## 2017-01-01 PROCEDURE — 25010000003 DEXAMETHASONE SODIUM PHOSPHATE 100 MG/10ML SOLUTION 1 ML VIAL: Performed by: INTERNAL MEDICINE

## 2017-01-01 PROCEDURE — 87804 INFLUENZA ASSAY W/OPTIC: CPT | Performed by: EMERGENCY MEDICINE

## 2017-01-01 PROCEDURE — G8988 SELF CARE GOAL STATUS: HCPCS

## 2017-01-01 PROCEDURE — 83550 IRON BINDING TEST: CPT | Performed by: INTERNAL MEDICINE

## 2017-01-01 PROCEDURE — 99024 POSTOP FOLLOW-UP VISIT: CPT | Performed by: SURGERY

## 2017-01-01 PROCEDURE — G0463 HOSPITAL OUTPT CLINIC VISIT: HCPCS | Performed by: INTERNAL MEDICINE

## 2017-01-01 PROCEDURE — 93005 ELECTROCARDIOGRAM TRACING: CPT | Performed by: EMERGENCY MEDICINE

## 2017-01-01 PROCEDURE — A9270 NON-COVERED ITEM OR SERVICE: HCPCS | Performed by: INTERNAL MEDICINE

## 2017-01-01 PROCEDURE — 85025 COMPLETE CBC W/AUTO DIFF WBC: CPT | Performed by: HOSPITALIST

## 2017-01-01 PROCEDURE — 97165 OT EVAL LOW COMPLEX 30 MIN: CPT

## 2017-01-01 PROCEDURE — 99212 OFFICE O/P EST SF 10 MIN: CPT | Performed by: SURGERY

## 2017-01-01 PROCEDURE — 81001 URINALYSIS AUTO W/SCOPE: CPT | Performed by: EMERGENCY MEDICINE

## 2017-01-01 PROCEDURE — 99233 SBSQ HOSP IP/OBS HIGH 50: CPT | Performed by: INTERNAL MEDICINE

## 2017-01-01 PROCEDURE — 74177 CT ABD & PELVIS W/CONTRAST: CPT

## 2017-01-01 PROCEDURE — 97162 PT EVAL MOD COMPLEX 30 MIN: CPT

## 2017-01-01 PROCEDURE — 87086 URINE CULTURE/COLONY COUNT: CPT | Performed by: EMERGENCY MEDICINE

## 2017-01-01 PROCEDURE — G8987 SELF CARE CURRENT STATUS: HCPCS

## 2017-01-01 PROCEDURE — 99222 1ST HOSP IP/OBS MODERATE 55: CPT | Performed by: INTERNAL MEDICINE

## 2017-01-01 PROCEDURE — 85018 HEMOGLOBIN: CPT | Performed by: INTERNAL MEDICINE

## 2017-01-01 PROCEDURE — 83605 ASSAY OF LACTIC ACID: CPT | Performed by: EMERGENCY MEDICINE

## 2017-01-01 PROCEDURE — 63710000001 POTASSIUM CHLORIDE 20 MEQ/15ML (10%) SOLUTION: Performed by: INTERNAL MEDICINE

## 2017-01-01 PROCEDURE — 0 IOPAMIDOL 61 % SOLUTION: Performed by: INTERNAL MEDICINE

## 2017-01-01 PROCEDURE — 85610 PROTHROMBIN TIME: CPT | Performed by: EMERGENCY MEDICINE

## 2017-01-01 RX ORDER — SODIUM CHLORIDE 0.9 % (FLUSH) 0.9 %
10 SYRINGE (ML) INJECTION AS NEEDED
Status: DISCONTINUED | OUTPATIENT
Start: 2017-01-01 | End: 2017-01-01 | Stop reason: HOSPADM

## 2017-01-01 RX ORDER — LABETALOL 100 MG/1
100 TABLET, FILM COATED ORAL 2 TIMES DAILY
Status: DISCONTINUED | OUTPATIENT
Start: 2017-01-01 | End: 2017-01-01

## 2017-01-01 RX ORDER — NALOXONE HCL 0.4 MG/ML
0.4 VIAL (ML) INJECTION
Status: DISCONTINUED | OUTPATIENT
Start: 2017-01-01 | End: 2017-01-01 | Stop reason: HOSPADM

## 2017-01-01 RX ORDER — SODIUM CHLORIDE 0.9 % (FLUSH) 0.9 %
10 SYRINGE (ML) INJECTION AS NEEDED
Status: CANCELLED | OUTPATIENT
Start: 2017-01-01

## 2017-01-01 RX ORDER — LEVOTHYROXINE SODIUM 0.12 MG/1
125 TABLET ORAL
Status: DISCONTINUED | OUTPATIENT
Start: 2017-01-01 | End: 2017-01-01 | Stop reason: HOSPADM

## 2017-01-01 RX ORDER — ATROPINE SULFATE 10 MG/ML
2 SOLUTION/ DROPS OPHTHALMIC 4 TIMES DAILY PRN
Status: DISCONTINUED | OUTPATIENT
Start: 2017-01-01 | End: 2017-01-01

## 2017-01-01 RX ORDER — OXYCODONE HYDROCHLORIDE 5 MG/1
5 TABLET ORAL EVERY 8 HOURS PRN
Qty: 40 TABLET | Refills: 0 | Status: SHIPPED | OUTPATIENT
Start: 2017-01-01

## 2017-01-01 RX ORDER — ONDANSETRON 4 MG/1
4 TABLET, FILM COATED ORAL 4 TIMES DAILY PRN
Qty: 40 TABLET | Refills: 3 | Status: SHIPPED | OUTPATIENT
Start: 2017-01-01

## 2017-01-01 RX ORDER — MORPHINE SULFATE 2 MG/ML
2 INJECTION, SOLUTION INTRAMUSCULAR; INTRAVENOUS
Status: DISCONTINUED | OUTPATIENT
Start: 2017-01-01 | End: 2017-01-01 | Stop reason: HOSPADM

## 2017-01-01 RX ORDER — MEGESTROL ACETATE 40 MG/ML
400 SUSPENSION ORAL DAILY
Qty: 300 ML | Refills: 1 | Status: SHIPPED | OUTPATIENT
Start: 2017-01-01

## 2017-01-01 RX ORDER — MEGESTROL ACETATE 40 MG/ML
400 SUSPENSION ORAL DAILY
Status: DISCONTINUED | OUTPATIENT
Start: 2017-01-01 | End: 2017-01-01

## 2017-01-01 RX ORDER — PANTOPRAZOLE SODIUM 40 MG/1
40 TABLET, DELAYED RELEASE ORAL DAILY
Status: DISCONTINUED | OUTPATIENT
Start: 2017-01-01 | End: 2017-01-01 | Stop reason: HOSPADM

## 2017-01-01 RX ORDER — LABETALOL 100 MG/1
100 TABLET, FILM COATED ORAL 2 TIMES DAILY
Status: DISCONTINUED | OUTPATIENT
Start: 2017-01-01 | End: 2017-01-01 | Stop reason: HOSPADM

## 2017-01-01 RX ORDER — ATORVASTATIN CALCIUM 20 MG/1
20 TABLET, FILM COATED ORAL EVERY EVENING
Status: DISCONTINUED | OUTPATIENT
Start: 2017-01-01 | End: 2017-01-01 | Stop reason: HOSPADM

## 2017-01-01 RX ORDER — HYDROCHLOROTHIAZIDE 25 MG/1
25 TABLET ORAL DAILY
Status: DISCONTINUED | OUTPATIENT
Start: 2017-01-01 | End: 2017-01-01 | Stop reason: HOSPADM

## 2017-01-01 RX ORDER — DIPHENHYDRAMINE HYDROCHLORIDE 50 MG/ML
25 INJECTION INTRAMUSCULAR; INTRAVENOUS EVERY 6 HOURS PRN
Status: DISCONTINUED | OUTPATIENT
Start: 2017-01-01 | End: 2017-01-01 | Stop reason: HOSPADM

## 2017-01-01 RX ORDER — DIAZEPAM 5 MG/ML
5 INJECTION, SOLUTION INTRAMUSCULAR; INTRAVENOUS EVERY 4 HOURS PRN
Status: DISCONTINUED | OUTPATIENT
Start: 2017-01-01 | End: 2017-01-01

## 2017-01-01 RX ORDER — SODIUM CHLORIDE 9 MG/ML
10 INJECTION, SOLUTION INTRAVENOUS CONTINUOUS
Status: DISCONTINUED | OUTPATIENT
Start: 2017-01-01 | End: 2017-01-01 | Stop reason: HOSPADM

## 2017-01-01 RX ORDER — SODIUM CHLORIDE 0.9 % (FLUSH) 0.9 %
1-10 SYRINGE (ML) INJECTION AS NEEDED
Status: CANCELLED | OUTPATIENT
Start: 2017-01-01

## 2017-01-01 RX ORDER — LEVOTHYROXINE SODIUM ANHYDROUS 100 UG/5ML
25 INJECTION, POWDER, LYOPHILIZED, FOR SOLUTION INTRAVENOUS ONCE
Status: COMPLETED | OUTPATIENT
Start: 2017-01-01 | End: 2017-01-01

## 2017-01-01 RX ORDER — FENTANYL 12 UG/H
1 PATCH TRANSDERMAL
Status: CANCELLED | OUTPATIENT
Start: 2017-01-01 | End: 2017-01-01

## 2017-01-01 RX ORDER — SODIUM CHLORIDE 0.9 % (FLUSH) 0.9 %
1-10 SYRINGE (ML) INJECTION AS NEEDED
Status: DISCONTINUED | OUTPATIENT
Start: 2017-01-01 | End: 2017-01-01 | Stop reason: HOSPADM

## 2017-01-01 RX ORDER — LEVOTHYROXINE SODIUM 0.1 MG/1
100 TABLET ORAL
Status: DISCONTINUED | OUTPATIENT
Start: 2017-01-01 | End: 2017-01-01

## 2017-01-01 RX ORDER — ONDANSETRON 4 MG/1
4 TABLET, FILM COATED ORAL 4 TIMES DAILY PRN
Status: DISCONTINUED | OUTPATIENT
Start: 2017-01-01 | End: 2017-01-01 | Stop reason: HOSPADM

## 2017-01-01 RX ORDER — SODIUM CHLORIDE 9 MG/ML
250 INJECTION, SOLUTION INTRAVENOUS ONCE
Status: COMPLETED | OUTPATIENT
Start: 2017-01-01 | End: 2017-01-01

## 2017-01-01 RX ORDER — SODIUM CHLORIDE 9 MG/ML
100 INJECTION, SOLUTION INTRAVENOUS CONTINUOUS
Status: DISCONTINUED | OUTPATIENT
Start: 2017-01-01 | End: 2017-01-01 | Stop reason: HOSPADM

## 2017-01-01 RX ORDER — LORAZEPAM 2 MG/ML
0.5 INJECTION INTRAMUSCULAR EVERY 4 HOURS PRN
Status: CANCELLED | OUTPATIENT
Start: 2017-01-01 | End: 2017-01-01

## 2017-01-01 RX ORDER — FENTANYL 50 UG/H
1 PATCH TRANSDERMAL
Status: DISCONTINUED | OUTPATIENT
Start: 2017-01-01 | End: 2017-01-01 | Stop reason: HOSPADM

## 2017-01-01 RX ORDER — HYDROCODONE BITARTRATE AND ACETAMINOPHEN 7.5; 325 MG/1; MG/1
1 TABLET ORAL EVERY 6 HOURS PRN
Qty: 90 TABLET | Refills: 0 | Status: SHIPPED | OUTPATIENT
Start: 2017-01-01 | End: 2017-01-01 | Stop reason: SINTOL

## 2017-01-01 RX ORDER — POTASSIUM CHLORIDE 20 MEQ/1
40 TABLET, EXTENDED RELEASE ORAL ONCE
Status: COMPLETED | OUTPATIENT
Start: 2017-01-01 | End: 2017-01-01

## 2017-01-01 RX ORDER — LEVOTHYROXINE SODIUM 0.1 MG/1
100 TABLET ORAL DAILY
Qty: 30 TABLET | Refills: 11 | Status: SHIPPED | OUTPATIENT
Start: 2017-01-01

## 2017-01-01 RX ORDER — MORPHINE SULFATE 10 MG/5ML
10 SOLUTION ORAL
Status: DISCONTINUED | OUTPATIENT
Start: 2017-01-01 | End: 2017-01-01

## 2017-01-01 RX ORDER — POTASSIUM CHLORIDE 20MEQ/15ML
60 LIQUID (ML) ORAL ONCE
Status: COMPLETED | OUTPATIENT
Start: 2017-01-01 | End: 2017-01-01

## 2017-01-01 RX ORDER — DILTIAZEM HYDROCHLORIDE 120 MG/1
120 CAPSULE, COATED, EXTENDED RELEASE ORAL
Status: DISCONTINUED | OUTPATIENT
Start: 2017-01-01 | End: 2017-01-01

## 2017-01-01 RX ORDER — ONDANSETRON 4 MG/1
4 TABLET, FILM COATED ORAL 4 TIMES DAILY PRN
Status: CANCELLED | OUTPATIENT
Start: 2017-01-01

## 2017-01-01 RX ORDER — SODIUM CHLORIDE 9 MG/ML
250 INJECTION, SOLUTION INTRAVENOUS ONCE
Status: CANCELLED | OUTPATIENT
Start: 2017-01-01

## 2017-01-01 RX ORDER — LEVOTHYROXINE SODIUM 0.12 MG/1
125 TABLET ORAL
Status: CANCELLED | OUTPATIENT
Start: 2017-01-01

## 2017-01-01 RX ORDER — MEGESTROL ACETATE 40 MG/ML
400 SUSPENSION ORAL DAILY
Status: DISCONTINUED | OUTPATIENT
Start: 2017-01-01 | End: 2017-01-01 | Stop reason: HOSPADM

## 2017-01-01 RX ORDER — FENTANYL 12 UG/H
1 PATCH TRANSDERMAL
Status: DISCONTINUED | OUTPATIENT
Start: 2017-01-01 | End: 2017-01-01

## 2017-01-01 RX ORDER — HYDROCHLOROTHIAZIDE 25 MG/1
25 TABLET ORAL DAILY
Status: DISCONTINUED | OUTPATIENT
Start: 2017-01-01 | End: 2017-01-01

## 2017-01-01 RX ORDER — PROMETHAZINE HYDROCHLORIDE 25 MG/ML
25 INJECTION, SOLUTION INTRAMUSCULAR; INTRAVENOUS EVERY 6 HOURS PRN
Status: DISCONTINUED | OUTPATIENT
Start: 2017-01-01 | End: 2017-01-01 | Stop reason: HOSPADM

## 2017-01-01 RX ORDER — POTASSIUM CHLORIDE 1500 MG/1
40 TABLET, FILM COATED, EXTENDED RELEASE ORAL ONCE
Status: COMPLETED | OUTPATIENT
Start: 2017-01-01 | End: 2017-01-01

## 2017-01-01 RX ORDER — LEVOTHYROXINE SODIUM 0.1 MG/1
100 TABLET ORAL DAILY
Qty: 30 TABLET | Refills: 0 | Status: SHIPPED | OUTPATIENT
Start: 2017-01-01 | End: 2017-01-01 | Stop reason: SDUPTHER

## 2017-01-01 RX ORDER — FENTANYL 12 UG/H
1 PATCH TRANSDERMAL
Status: DISCONTINUED | OUTPATIENT
Start: 2017-01-01 | End: 2017-01-01 | Stop reason: HOSPADM

## 2017-01-01 RX ORDER — ASPIRIN 81 MG/1
81 TABLET ORAL EVERY OTHER DAY
Status: DISCONTINUED | OUTPATIENT
Start: 2017-01-01 | End: 2017-01-01

## 2017-01-01 RX ORDER — LORAZEPAM 2 MG/ML
0.25 INJECTION INTRAMUSCULAR EVERY 4 HOURS PRN
Status: DISCONTINUED | OUTPATIENT
Start: 2017-01-01 | End: 2017-01-01

## 2017-01-01 RX ORDER — POTASSIUM CHLORIDE 20MEQ/15ML
60 LIQUID (ML) ORAL ONCE
Status: CANCELLED
Start: 2017-01-01 | End: 2017-01-01

## 2017-01-01 RX ORDER — HYDROMORPHONE HCL 110MG/55ML
2 PATIENT CONTROLLED ANALGESIA SYRINGE INTRAVENOUS
Status: DISCONTINUED | OUTPATIENT
Start: 2017-01-01 | End: 2017-01-01

## 2017-01-01 RX ORDER — MORPHINE SULFATE 10 MG/5ML
5 SOLUTION ORAL
Status: DISCONTINUED | OUTPATIENT
Start: 2017-01-01 | End: 2017-01-01

## 2017-01-01 RX ORDER — ASPIRIN 81 MG/1
81 TABLET ORAL EVERY OTHER DAY
Status: DISCONTINUED | OUTPATIENT
Start: 2017-01-01 | End: 2017-01-01 | Stop reason: HOSPADM

## 2017-01-01 RX ORDER — POTASSIUM CHLORIDE 750 MG/1
40 CAPSULE, EXTENDED RELEASE ORAL ONCE
Status: CANCELLED
Start: 2017-01-01 | End: 2017-01-01

## 2017-01-01 RX ORDER — SCOLOPAMINE TRANSDERMAL SYSTEM 1 MG/1
1 PATCH, EXTENDED RELEASE TRANSDERMAL
Status: DISCONTINUED | OUTPATIENT
Start: 2017-01-01 | End: 2017-01-01 | Stop reason: HOSPADM

## 2017-01-01 RX ORDER — LORAZEPAM 2 MG/ML
0.5 INJECTION INTRAMUSCULAR EVERY 4 HOURS PRN
Status: DISCONTINUED | OUTPATIENT
Start: 2017-01-01 | End: 2017-01-01

## 2017-01-01 RX ORDER — MORPHINE SULFATE 2 MG/ML
1 INJECTION, SOLUTION INTRAMUSCULAR; INTRAVENOUS EVERY 4 HOURS PRN
Status: DISCONTINUED | OUTPATIENT
Start: 2017-01-01 | End: 2017-01-01 | Stop reason: HOSPADM

## 2017-01-01 RX ORDER — ESCITALOPRAM OXALATE 10 MG/1
10 TABLET ORAL DAILY
Status: DISCONTINUED | OUTPATIENT
Start: 2017-01-01 | End: 2017-01-01

## 2017-01-01 RX ORDER — DIAZEPAM 2 MG/1
2 TABLET ORAL EVERY 4 HOURS PRN
Status: DISCONTINUED | OUTPATIENT
Start: 2017-01-01 | End: 2017-01-01

## 2017-01-01 RX ORDER — DILTIAZEM HYDROCHLORIDE 120 MG/1
120 CAPSULE, COATED, EXTENDED RELEASE ORAL
Status: DISCONTINUED | OUTPATIENT
Start: 2017-01-01 | End: 2017-01-01 | Stop reason: HOSPADM

## 2017-01-01 RX ORDER — ASPIRIN 81 MG/1
81 TABLET ORAL EVERY OTHER DAY
Status: CANCELLED | OUTPATIENT
Start: 2017-01-01

## 2017-01-01 RX ORDER — SUCRALFATE ORAL 1 G/10ML
1 SUSPENSION ORAL EVERY 6 HOURS SCHEDULED
Status: DISCONTINUED | OUTPATIENT
Start: 2017-01-01 | End: 2017-01-01

## 2017-01-01 RX ORDER — LEVOTHYROXINE SODIUM 0.12 MG/1
125 TABLET ORAL
Status: DISCONTINUED | OUTPATIENT
Start: 2017-01-01 | End: 2017-01-01

## 2017-01-01 RX ORDER — ESCITALOPRAM OXALATE 10 MG/1
10 TABLET ORAL DAILY
Status: DISCONTINUED | OUTPATIENT
Start: 2017-01-01 | End: 2017-01-01 | Stop reason: HOSPADM

## 2017-01-01 RX ORDER — PANTOPRAZOLE SODIUM 40 MG/1
40 TABLET, DELAYED RELEASE ORAL DAILY
Status: DISCONTINUED | OUTPATIENT
Start: 2017-01-01 | End: 2017-01-01

## 2017-01-01 RX ORDER — ONDANSETRON 2 MG/ML
4 INJECTION INTRAMUSCULAR; INTRAVENOUS ONCE
Status: COMPLETED | OUTPATIENT
Start: 2017-01-01 | End: 2017-01-01

## 2017-01-01 RX ORDER — MEGESTROL ACETATE 40 MG/ML
400 SUSPENSION ORAL DAILY
Status: CANCELLED | OUTPATIENT
Start: 2017-01-01

## 2017-01-01 RX ORDER — DILTIAZEM HYDROCHLORIDE 120 MG/1
120 CAPSULE, COATED, EXTENDED RELEASE ORAL
Status: CANCELLED | OUTPATIENT
Start: 2017-01-01

## 2017-01-01 RX ORDER — NALOXONE HCL 0.4 MG/ML
0.4 VIAL (ML) INJECTION
Status: CANCELLED | OUTPATIENT
Start: 2017-01-01

## 2017-01-01 RX ORDER — LABETALOL 100 MG/1
100 TABLET, FILM COATED ORAL 2 TIMES DAILY
Status: CANCELLED | OUTPATIENT
Start: 2017-01-01

## 2017-01-01 RX ORDER — SODIUM CHLORIDE 9 MG/ML
100 INJECTION, SOLUTION INTRAVENOUS CONTINUOUS
Status: CANCELLED | OUTPATIENT
Start: 2017-01-01

## 2017-01-01 RX ORDER — WATER 1000 ML/1000ML
INJECTION, SOLUTION INTRAVENOUS
Status: COMPLETED
Start: 2017-01-01 | End: 2017-01-01

## 2017-01-01 RX ORDER — OXYCODONE HYDROCHLORIDE 5 MG/1
5 TABLET ORAL EVERY 8 HOURS PRN
Status: DISCONTINUED | OUTPATIENT
Start: 2017-01-01 | End: 2017-01-01 | Stop reason: HOSPADM

## 2017-01-01 RX ORDER — DIAZEPAM 2 MG/1
4 TABLET ORAL EVERY 4 HOURS PRN
Status: DISCONTINUED | OUTPATIENT
Start: 2017-01-01 | End: 2017-01-01

## 2017-01-01 RX ORDER — PANTOPRAZOLE SODIUM 40 MG/1
40 TABLET, DELAYED RELEASE ORAL DAILY
Status: CANCELLED | OUTPATIENT
Start: 2017-01-01

## 2017-01-01 RX ORDER — ONDANSETRON 4 MG/1
4 TABLET, FILM COATED ORAL 4 TIMES DAILY PRN
Status: DISCONTINUED | OUTPATIENT
Start: 2017-01-01 | End: 2017-01-01

## 2017-01-01 RX ORDER — LORAZEPAM 2 MG/ML
2 INJECTION INTRAMUSCULAR
Status: DISCONTINUED | OUTPATIENT
Start: 2017-01-01 | End: 2017-01-01 | Stop reason: HOSPADM

## 2017-01-01 RX ORDER — ESCITALOPRAM OXALATE 10 MG/1
10 TABLET ORAL DAILY
Status: CANCELLED | OUTPATIENT
Start: 2017-01-01

## 2017-01-01 RX ORDER — LORAZEPAM 2 MG/ML
2 INJECTION INTRAMUSCULAR EVERY 4 HOURS
Status: DISCONTINUED | OUTPATIENT
Start: 2017-01-01 | End: 2017-01-01 | Stop reason: HOSPADM

## 2017-01-01 RX ORDER — LEVOTHYROXINE SODIUM 0.07 MG/1
TABLET ORAL
COMMUNITY
Start: 2017-01-01 | End: 2017-01-01 | Stop reason: DRUGHIGH

## 2017-01-01 RX ORDER — ATORVASTATIN CALCIUM 20 MG/1
20 TABLET, FILM COATED ORAL EVERY EVENING
Status: CANCELLED | OUTPATIENT
Start: 2017-01-01

## 2017-01-01 RX ORDER — HYDROCHLOROTHIAZIDE 25 MG/1
25 TABLET ORAL DAILY
Status: CANCELLED | OUTPATIENT
Start: 2017-01-01

## 2017-01-01 RX ORDER — ATORVASTATIN CALCIUM 20 MG/1
20 TABLET, FILM COATED ORAL EVERY EVENING
Status: DISCONTINUED | OUTPATIENT
Start: 2017-01-01 | End: 2017-01-01

## 2017-01-01 RX ORDER — PROMETHAZINE HYDROCHLORIDE 25 MG/ML
12.5 INJECTION, SOLUTION INTRAMUSCULAR; INTRAVENOUS EVERY 6 HOURS PRN
Status: DISCONTINUED | OUTPATIENT
Start: 2017-01-01 | End: 2017-01-01

## 2017-01-01 RX ORDER — FENTANYL 25 UG/H
1 PATCH TRANSDERMAL
Status: DISCONTINUED | OUTPATIENT
Start: 2017-01-01 | End: 2017-01-01

## 2017-01-01 RX ORDER — ATROPINE SULFATE 10 MG/ML
2 SOLUTION/ DROPS OPHTHALMIC
Status: DISCONTINUED | OUTPATIENT
Start: 2017-01-01 | End: 2017-01-01 | Stop reason: HOSPADM

## 2017-01-01 RX ORDER — OXYCODONE AND ACETAMINOPHEN 7.5; 325 MG/1; MG/1
1 TABLET ORAL EVERY 4 HOURS PRN
Status: DISCONTINUED | OUTPATIENT
Start: 2017-01-01 | End: 2017-01-01

## 2017-01-01 RX ADMIN — HYDROMORPHONE HYDROCHLORIDE 1 MG: 1 INJECTION, SOLUTION INTRAMUSCULAR; INTRAVENOUS; SUBCUTANEOUS at 13:00

## 2017-01-01 RX ADMIN — LORAZEPAM 2 MG: 2 INJECTION INTRAMUSCULAR; INTRAVENOUS at 10:05

## 2017-01-01 RX ADMIN — ONDANSETRON 8 MG: 2 INJECTION INTRAMUSCULAR; INTRAVENOUS at 15:44

## 2017-01-01 RX ADMIN — ONDANSETRON 8 MG: 2 INJECTION INTRAMUSCULAR; INTRAVENOUS at 23:40

## 2017-01-01 RX ADMIN — ATORVASTATIN CALCIUM 20 MG: 20 TABLET, FILM COATED ORAL at 17:38

## 2017-01-01 RX ADMIN — LABETALOL HYDROCHLORIDE 100 MG: 100 TABLET, FILM COATED ORAL at 17:13

## 2017-01-01 RX ADMIN — DILTIAZEM HYDROCHLORIDE 120 MG: 120 CAPSULE, COATED, EXTENDED RELEASE ORAL at 08:40

## 2017-01-01 RX ADMIN — ASPIRIN 81 MG: 81 TABLET ORAL at 08:40

## 2017-01-01 RX ADMIN — ONDANSETRON 8 MG: 2 INJECTION INTRAMUSCULAR; INTRAVENOUS at 14:40

## 2017-01-01 RX ADMIN — HYDROMORPHONE HYDROCHLORIDE 1 MG: 1 INJECTION, SOLUTION INTRAMUSCULAR; INTRAVENOUS; SUBCUTANEOUS at 16:31

## 2017-01-01 RX ADMIN — SODIUM CHLORIDE 290 MG: 9 INJECTION, SOLUTION INTRAVENOUS at 10:32

## 2017-01-01 RX ADMIN — SUCRALFATE 1 G: 1 SUSPENSION ORAL at 06:19

## 2017-01-01 RX ADMIN — SODIUM CHLORIDE 250 ML: 9 INJECTION, SOLUTION INTRAVENOUS at 10:22

## 2017-01-01 RX ADMIN — MEGESTROL ACETATE 400 MG: 40 SUSPENSION ORAL at 08:44

## 2017-01-01 RX ADMIN — MEGESTROL ACETATE 400 MG: 40 SUSPENSION ORAL at 09:56

## 2017-01-01 RX ADMIN — LORAZEPAM 2 MG: 2 INJECTION INTRAMUSCULAR; INTRAVENOUS at 03:39

## 2017-01-01 RX ADMIN — HYDROCHLOROTHIAZIDE 25 MG: 25 TABLET ORAL at 08:53

## 2017-01-01 RX ADMIN — MORPHINE SULFATE 5 MG: 10 SOLUTION ORAL at 18:18

## 2017-01-01 RX ADMIN — LEVOTHYROXINE SODIUM 125 MCG: 125 TABLET ORAL at 05:42

## 2017-01-01 RX ADMIN — MEGESTROL ACETATE 400 MG: 40 SUSPENSION ORAL at 08:57

## 2017-01-01 RX ADMIN — PROMETHAZINE HYDROCHLORIDE 12.5 MG: 25 INJECTION, SOLUTION INTRAMUSCULAR; INTRAVENOUS at 20:08

## 2017-01-01 RX ADMIN — PANTOPRAZOLE SODIUM 40 MG: 40 TABLET, DELAYED RELEASE ORAL at 08:12

## 2017-01-01 RX ADMIN — ONDANSETRON 8 MG: 2 INJECTION INTRAMUSCULAR; INTRAVENOUS at 23:28

## 2017-01-01 RX ADMIN — LEVOTHYROXINE SODIUM 125 MCG: 125 TABLET ORAL at 06:16

## 2017-01-01 RX ADMIN — ONDANSETRON 8 MG: 2 INJECTION INTRAMUSCULAR; INTRAVENOUS at 19:15

## 2017-01-01 RX ADMIN — HYDROMORPHONE HYDROCHLORIDE 1 MG: 1 INJECTION, SOLUTION INTRAMUSCULAR; INTRAVENOUS; SUBCUTANEOUS at 20:37

## 2017-01-01 RX ADMIN — LORAZEPAM 2 MG: 2 INJECTION INTRAMUSCULAR; INTRAVENOUS at 06:17

## 2017-01-01 RX ADMIN — FENTANYL 1 PATCH: 25 PATCH, EXTENDED RELEASE TRANSDERMAL at 12:34

## 2017-01-01 RX ADMIN — ATORVASTATIN CALCIUM 20 MG: 20 TABLET, FILM COATED ORAL at 17:10

## 2017-01-01 RX ADMIN — PANTOPRAZOLE SODIUM 40 MG: 40 TABLET, DELAYED RELEASE ORAL at 08:34

## 2017-01-01 RX ADMIN — ONDANSETRON 8 MG: 2 INJECTION INTRAMUSCULAR; INTRAVENOUS at 22:20

## 2017-01-01 RX ADMIN — ONDANSETRON 4 MG: 4 TABLET, FILM COATED ORAL at 08:58

## 2017-01-01 RX ADMIN — SODIUM CHLORIDE 10 ML/HR: 900 INJECTION, SOLUTION INTRAVENOUS at 16:45

## 2017-01-01 RX ADMIN — PROMETHAZINE HYDROCHLORIDE 12.5 MG: 25 INJECTION, SOLUTION INTRAMUSCULAR; INTRAVENOUS at 12:26

## 2017-01-01 RX ADMIN — ASPIRIN 81 MG: 81 TABLET ORAL at 08:18

## 2017-01-01 RX ADMIN — SUCRALFATE 1 G: 1 SUSPENSION ORAL at 15:47

## 2017-01-01 RX ADMIN — SODIUM CHLORIDE: 9 INJECTION, SOLUTION INTRAVENOUS at 11:36

## 2017-01-01 RX ADMIN — ESCITALOPRAM OXALATE 10 MG: 10 TABLET ORAL at 08:44

## 2017-01-01 RX ADMIN — LABETALOL HYDROCHLORIDE 100 MG: 100 TABLET, FILM COATED ORAL at 17:10

## 2017-01-01 RX ADMIN — DEXAMETHASONE SODIUM PHOSPHATE 12 MG: 10 INJECTION, SOLUTION INTRAMUSCULAR; INTRAVENOUS at 12:44

## 2017-01-01 RX ADMIN — Medication 10 ML: at 13:00

## 2017-01-01 RX ADMIN — HYDROMORPHONE HYDROCHLORIDE 1 MG: 1 INJECTION, SOLUTION INTRAMUSCULAR; INTRAVENOUS; SUBCUTANEOUS at 17:17

## 2017-01-01 RX ADMIN — ATORVASTATIN CALCIUM 20 MG: 20 TABLET, FILM COATED ORAL at 16:33

## 2017-01-01 RX ADMIN — SODIUM CHLORIDE 290 MG: 9 INJECTION, SOLUTION INTRAVENOUS at 11:34

## 2017-01-01 RX ADMIN — HYDROMORPHONE HYDROCHLORIDE 1 MG: 1 INJECTION, SOLUTION INTRAMUSCULAR; INTRAVENOUS; SUBCUTANEOUS at 08:11

## 2017-01-01 RX ADMIN — ESCITALOPRAM OXALATE 10 MG: 10 TABLET ORAL at 08:33

## 2017-01-01 RX ADMIN — ONDANSETRON 8 MG: 2 INJECTION INTRAMUSCULAR; INTRAVENOUS at 10:48

## 2017-01-01 RX ADMIN — HYDROMORPHONE HYDROCHLORIDE 1 MG: 1 INJECTION, SOLUTION INTRAMUSCULAR; INTRAVENOUS; SUBCUTANEOUS at 20:55

## 2017-01-01 RX ADMIN — ATORVASTATIN CALCIUM 20 MG: 20 TABLET, FILM COATED ORAL at 17:13

## 2017-01-01 RX ADMIN — LABETALOL HYDROCHLORIDE 100 MG: 100 TABLET, FILM COATED ORAL at 08:18

## 2017-01-01 RX ADMIN — ASPIRIN 81 MG: 81 TABLET ORAL at 08:33

## 2017-01-01 RX ADMIN — LORAZEPAM 2 MG: 2 INJECTION INTRAMUSCULAR; INTRAVENOUS at 06:34

## 2017-01-01 RX ADMIN — ESCITALOPRAM OXALATE 10 MG: 10 TABLET ORAL at 08:11

## 2017-01-01 RX ADMIN — SUCRALFATE 1 G: 1 SUSPENSION ORAL at 14:40

## 2017-01-01 RX ADMIN — ONDANSETRON 8 MG: 2 INJECTION INTRAMUSCULAR; INTRAVENOUS at 11:52

## 2017-01-01 RX ADMIN — LABETALOL HYDROCHLORIDE 100 MG: 100 TABLET, FILM COATED ORAL at 17:27

## 2017-01-01 RX ADMIN — ESCITALOPRAM OXALATE 10 MG: 10 TABLET ORAL at 08:22

## 2017-01-01 RX ADMIN — HYDROMORPHONE HYDROCHLORIDE 2 MG: 2 INJECTION, SOLUTION INTRAMUSCULAR; INTRAVENOUS; SUBCUTANEOUS at 10:57

## 2017-01-01 RX ADMIN — HYDROMORPHONE HYDROCHLORIDE 2 MG: 2 INJECTION, SOLUTION INTRAMUSCULAR; INTRAVENOUS; SUBCUTANEOUS at 08:20

## 2017-01-01 RX ADMIN — LEVOTHYROXINE SODIUM 125 MCG: 125 TABLET ORAL at 06:32

## 2017-01-01 RX ADMIN — ONDANSETRON 8 MG: 2 INJECTION INTRAMUSCULAR; INTRAVENOUS at 11:10

## 2017-01-01 RX ADMIN — SODIUM CHLORIDE 100 ML/HR: 9 INJECTION, SOLUTION INTRAVENOUS at 00:53

## 2017-01-01 RX ADMIN — LORAZEPAM 2 MG: 2 INJECTION INTRAMUSCULAR; INTRAVENOUS at 07:23

## 2017-01-01 RX ADMIN — HYDROMORPHONE HYDROCHLORIDE 2 MG: 2 INJECTION, SOLUTION INTRAMUSCULAR; INTRAVENOUS; SUBCUTANEOUS at 20:14

## 2017-01-01 RX ADMIN — HYDROMORPHONE HYDROCHLORIDE 1 MG: 1 INJECTION, SOLUTION INTRAMUSCULAR; INTRAVENOUS; SUBCUTANEOUS at 15:57

## 2017-01-01 RX ADMIN — LORAZEPAM 2 MG: 2 INJECTION INTRAMUSCULAR; INTRAVENOUS at 22:20

## 2017-01-01 RX ADMIN — SUCRALFATE 1 G: 1 SUSPENSION ORAL at 17:34

## 2017-01-01 RX ADMIN — MORPHINE SULFATE 2 MG: 2 INJECTION, SOLUTION INTRAMUSCULAR; INTRAVENOUS at 15:50

## 2017-01-01 RX ADMIN — ONDANSETRON 8 MG: 2 INJECTION INTRAMUSCULAR; INTRAVENOUS at 03:28

## 2017-01-01 RX ADMIN — Medication 10 ML: at 08:50

## 2017-01-01 RX ADMIN — ONDANSETRON 8 MG: 2 INJECTION INTRAMUSCULAR; INTRAVENOUS at 15:37

## 2017-01-01 RX ADMIN — HYDROMORPHONE HYDROCHLORIDE 1 MG: 1 INJECTION, SOLUTION INTRAMUSCULAR; INTRAVENOUS; SUBCUTANEOUS at 17:42

## 2017-01-01 RX ADMIN — LORAZEPAM 2 MG: 2 INJECTION INTRAMUSCULAR; INTRAVENOUS at 16:04

## 2017-01-01 RX ADMIN — LEVOTHYROXINE SODIUM 125 MCG: 125 TABLET ORAL at 06:00

## 2017-01-01 RX ADMIN — HYDROMORPHONE HYDROCHLORIDE 1 MG: 1 INJECTION, SOLUTION INTRAMUSCULAR; INTRAVENOUS; SUBCUTANEOUS at 02:59

## 2017-01-01 RX ADMIN — ONDANSETRON 8 MG: 2 INJECTION INTRAMUSCULAR; INTRAVENOUS at 16:05

## 2017-01-01 RX ADMIN — HYDROMORPHONE HYDROCHLORIDE 1 MG: 1 INJECTION, SOLUTION INTRAMUSCULAR; INTRAVENOUS; SUBCUTANEOUS at 03:51

## 2017-01-01 RX ADMIN — HYDROCHLOROTHIAZIDE 25 MG: 25 TABLET ORAL at 08:19

## 2017-01-01 RX ADMIN — POTASSIUM CHLORIDE 40 MEQ: 20 TABLET, EXTENDED RELEASE ORAL at 04:49

## 2017-01-01 RX ADMIN — OXYCODONE HYDROCHLORIDE AND ACETAMINOPHEN 1 TABLET: 7.5; 325 TABLET ORAL at 21:54

## 2017-01-01 RX ADMIN — HYDROCHLOROTHIAZIDE 25 MG: 25 TABLET ORAL at 08:57

## 2017-01-01 RX ADMIN — SODIUM CHLORIDE 100 ML/HR: 9 INJECTION, SOLUTION INTRAVENOUS at 21:37

## 2017-01-01 RX ADMIN — MEGESTROL ACETATE 400 MG: 40 SUSPENSION ORAL at 08:11

## 2017-01-01 RX ADMIN — PANTOPRAZOLE SODIUM 40 MG: 40 TABLET, DELAYED RELEASE ORAL at 08:18

## 2017-01-01 RX ADMIN — DILTIAZEM HYDROCHLORIDE 120 MG: 120 CAPSULE, COATED, EXTENDED RELEASE ORAL at 08:44

## 2017-01-01 RX ADMIN — ONDANSETRON 4 MG: 2 INJECTION INTRAMUSCULAR; INTRAVENOUS at 18:34

## 2017-01-01 RX ADMIN — LABETALOL HYDROCHLORIDE 100 MG: 100 TABLET, FILM COATED ORAL at 17:12

## 2017-01-01 RX ADMIN — LEVOTHYROXINE SODIUM ANHYDROUS 25 MCG: 100 INJECTION, POWDER, LYOPHILIZED, FOR SOLUTION INTRAVENOUS at 21:52

## 2017-01-01 RX ADMIN — ONDANSETRON 8 MG: 2 INJECTION INTRAMUSCULAR; INTRAVENOUS at 05:11

## 2017-01-01 RX ADMIN — ATROPINE SULFATE 2 DROP: 10 SOLUTION/ DROPS OPHTHALMIC at 03:40

## 2017-01-01 RX ADMIN — Medication: at 08:38

## 2017-01-01 RX ADMIN — SODIUM CHLORIDE 100 ML/HR: 9 INJECTION, SOLUTION INTRAVENOUS at 05:53

## 2017-01-01 RX ADMIN — HYDROMORPHONE HYDROCHLORIDE 1 MG: 1 INJECTION, SOLUTION INTRAMUSCULAR; INTRAVENOUS; SUBCUTANEOUS at 08:44

## 2017-01-01 RX ADMIN — POTASSIUM CHLORIDE 40 MEQ: 20 TABLET, EXTENDED RELEASE ORAL at 18:13

## 2017-01-01 RX ADMIN — SODIUM CHLORIDE 250 ML: 9 INJECTION, SOLUTION INTRAVENOUS at 09:40

## 2017-01-01 RX ADMIN — SODIUM CHLORIDE 10 ML/HR: 900 INJECTION, SOLUTION INTRAVENOUS at 00:01

## 2017-01-01 RX ADMIN — ONDANSETRON 8 MG: 2 INJECTION INTRAMUSCULAR; INTRAVENOUS at 14:59

## 2017-01-01 RX ADMIN — Medication: at 21:42

## 2017-01-01 RX ADMIN — PANTOPRAZOLE SODIUM 40 MG: 40 TABLET, DELAYED RELEASE ORAL at 08:57

## 2017-01-01 RX ADMIN — LORAZEPAM 2 MG: 2 INJECTION INTRAMUSCULAR; INTRAVENOUS at 21:10

## 2017-01-01 RX ADMIN — PROMETHAZINE HYDROCHLORIDE 25 MG: 25 INJECTION, SOLUTION INTRAMUSCULAR; INTRAVENOUS at 17:35

## 2017-01-01 RX ADMIN — ATORVASTATIN CALCIUM 20 MG: 20 TABLET, FILM COATED ORAL at 17:28

## 2017-01-01 RX ADMIN — PANTOPRAZOLE SODIUM 40 MG: 40 TABLET, DELAYED RELEASE ORAL at 08:20

## 2017-01-01 RX ADMIN — SODIUM CHLORIDE 290 MG: 9 INJECTION, SOLUTION INTRAVENOUS at 11:46

## 2017-01-01 RX ADMIN — DILTIAZEM HYDROCHLORIDE 120 MG: 120 CAPSULE, COATED, EXTENDED RELEASE ORAL at 08:45

## 2017-01-01 RX ADMIN — ONDANSETRON 8 MG: 2 INJECTION INTRAMUSCULAR; INTRAVENOUS at 06:34

## 2017-01-01 RX ADMIN — ESCITALOPRAM OXALATE 10 MG: 10 TABLET ORAL at 08:18

## 2017-01-01 RX ADMIN — LEVOTHYROXINE SODIUM 125 MCG: 125 TABLET ORAL at 06:19

## 2017-01-01 RX ADMIN — MEGESTROL ACETATE 400 MG: 40 SUSPENSION ORAL at 08:45

## 2017-01-01 RX ADMIN — ATORVASTATIN CALCIUM 20 MG: 20 TABLET, FILM COATED ORAL at 17:12

## 2017-01-01 RX ADMIN — LEVOTHYROXINE SODIUM 100 MCG: 100 TABLET ORAL at 05:51

## 2017-01-01 RX ADMIN — SUCRALFATE 1 G: 1 SUSPENSION ORAL at 10:57

## 2017-01-01 RX ADMIN — ONDANSETRON 4 MG: 4 TABLET, FILM COATED ORAL at 10:31

## 2017-01-01 RX ADMIN — SODIUM CHLORIDE 1000 ML: 900 INJECTION, SOLUTION INTRAVENOUS at 18:34

## 2017-01-01 RX ADMIN — LABETALOL HYDROCHLORIDE 100 MG: 100 TABLET, FILM COATED ORAL at 18:25

## 2017-01-01 RX ADMIN — LABETALOL HYDROCHLORIDE 100 MG: 100 TABLET, FILM COATED ORAL at 21:53

## 2017-01-01 RX ADMIN — NIFEDIPINE 120 MG: 10 CAPSULE ORAL at 08:52

## 2017-01-01 RX ADMIN — LORAZEPAM 2 MG: 2 INJECTION INTRAMUSCULAR; INTRAVENOUS at 03:54

## 2017-01-01 RX ADMIN — MORPHINE SULFATE 2 MG: 2 INJECTION, SOLUTION INTRAMUSCULAR; INTRAVENOUS at 16:06

## 2017-01-01 RX ADMIN — HYDROMORPHONE HYDROCHLORIDE 1 MG: 1 INJECTION, SOLUTION INTRAMUSCULAR; INTRAVENOUS; SUBCUTANEOUS at 21:15

## 2017-01-01 RX ADMIN — LABETALOL HYDROCHLORIDE 100 MG: 100 TABLET, FILM COATED ORAL at 17:34

## 2017-01-01 RX ADMIN — SUCRALFATE 1 G: 1 SUSPENSION ORAL at 06:05

## 2017-01-01 RX ADMIN — ONDANSETRON 8 MG: 2 INJECTION INTRAMUSCULAR; INTRAVENOUS at 03:15

## 2017-01-01 RX ADMIN — HYDROMORPHONE HYDROCHLORIDE 1 MG: 1 INJECTION, SOLUTION INTRAMUSCULAR; INTRAVENOUS; SUBCUTANEOUS at 18:08

## 2017-01-01 RX ADMIN — POTASSIUM CHLORIDE 40 MEQ: 20 TABLET, EXTENDED RELEASE ORAL at 20:22

## 2017-01-01 RX ADMIN — HYDROCHLOROTHIAZIDE 25 MG: 25 TABLET ORAL at 08:40

## 2017-01-01 RX ADMIN — Medication: at 08:14

## 2017-01-01 RX ADMIN — Medication: at 09:12

## 2017-01-01 RX ADMIN — ONDANSETRON 4 MG: 4 TABLET, FILM COATED ORAL at 09:59

## 2017-01-01 RX ADMIN — LORAZEPAM 2 MG: 2 INJECTION INTRAMUSCULAR; INTRAVENOUS at 11:21

## 2017-01-01 RX ADMIN — Medication: at 14:25

## 2017-01-01 RX ADMIN — LEVOTHYROXINE SODIUM 125 MCG: 125 TABLET ORAL at 06:04

## 2017-01-01 RX ADMIN — LEVOTHYROXINE SODIUM 125 MCG: 125 TABLET ORAL at 05:30

## 2017-01-01 RX ADMIN — Medication 10 ML: at 08:13

## 2017-01-01 RX ADMIN — Medication 10 ML: at 08:19

## 2017-01-01 RX ADMIN — LORAZEPAM 2 MG: 2 INJECTION INTRAMUSCULAR; INTRAVENOUS at 05:36

## 2017-01-01 RX ADMIN — NIFEDIPINE 120 MG: 10 CAPSULE ORAL at 08:57

## 2017-01-01 RX ADMIN — PANTOPRAZOLE SODIUM 40 MG: 40 TABLET, DELAYED RELEASE ORAL at 07:55

## 2017-01-01 RX ADMIN — ONDANSETRON 8 MG: 2 INJECTION INTRAMUSCULAR; INTRAVENOUS at 23:00

## 2017-01-01 RX ADMIN — ONDANSETRON 4 MG: 4 TABLET, FILM COATED ORAL at 06:16

## 2017-01-01 RX ADMIN — HYDROCHLOROTHIAZIDE 25 MG: 25 TABLET ORAL at 08:11

## 2017-01-01 RX ADMIN — OXYCODONE HYDROCHLORIDE AND ACETAMINOPHEN 1 TABLET: 7.5; 325 TABLET ORAL at 08:33

## 2017-01-01 RX ADMIN — LORAZEPAM 2 MG: 2 INJECTION INTRAMUSCULAR; INTRAVENOUS at 10:47

## 2017-01-01 RX ADMIN — MEGESTROL ACETATE 400 MG: 40 SUSPENSION ORAL at 08:33

## 2017-01-01 RX ADMIN — LABETALOL HYDROCHLORIDE 100 MG: 100 TABLET, FILM COATED ORAL at 08:39

## 2017-01-01 RX ADMIN — FENTANYL 1 PATCH: 50 PATCH, EXTENDED RELEASE TRANSDERMAL at 17:30

## 2017-01-01 RX ADMIN — DIAZEPAM 2 MG: 2 TABLET ORAL at 10:18

## 2017-01-01 RX ADMIN — LABETALOL HYDROCHLORIDE 100 MG: 100 TABLET, FILM COATED ORAL at 08:53

## 2017-01-01 RX ADMIN — LORAZEPAM 2 MG: 2 INJECTION INTRAMUSCULAR; INTRAVENOUS at 14:34

## 2017-01-01 RX ADMIN — HYDROCHLOROTHIAZIDE 25 MG: 25 TABLET ORAL at 08:45

## 2017-01-01 RX ADMIN — OXYCODONE HYDROCHLORIDE AND ACETAMINOPHEN 1 TABLET: 7.5; 325 TABLET ORAL at 16:25

## 2017-01-01 RX ADMIN — SODIUM CHLORIDE 125 MG: 9 INJECTION, SOLUTION INTRAVENOUS at 08:58

## 2017-01-01 RX ADMIN — SCOPALAMINE 1 PATCH: 1 PATCH, EXTENDED RELEASE TRANSDERMAL at 11:10

## 2017-01-01 RX ADMIN — Medication 10 ML: at 11:54

## 2017-01-01 RX ADMIN — OXYCODONE HYDROCHLORIDE AND ACETAMINOPHEN 1 TABLET: 7.5; 325 TABLET ORAL at 20:07

## 2017-01-01 RX ADMIN — ONDANSETRON 8 MG: 2 INJECTION INTRAMUSCULAR; INTRAVENOUS at 03:00

## 2017-01-01 RX ADMIN — ONDANSETRON 8 MG: 2 INJECTION INTRAMUSCULAR; INTRAVENOUS at 18:32

## 2017-01-01 RX ADMIN — MORPHINE SULFATE 2 MG: 2 INJECTION, SOLUTION INTRAMUSCULAR; INTRAVENOUS at 19:50

## 2017-01-01 RX ADMIN — SODIUM CHLORIDE 100 ML/HR: 9 INJECTION, SOLUTION INTRAVENOUS at 16:33

## 2017-01-01 RX ADMIN — LORAZEPAM 2 MG: 2 INJECTION INTRAMUSCULAR; INTRAVENOUS at 07:58

## 2017-01-01 RX ADMIN — ASPIRIN 81 MG: 81 TABLET ORAL at 08:11

## 2017-01-01 RX ADMIN — LABETALOL HYDROCHLORIDE 100 MG: 100 TABLET, FILM COATED ORAL at 08:34

## 2017-01-01 RX ADMIN — ATORVASTATIN CALCIUM 20 MG: 20 TABLET, FILM COATED ORAL at 17:59

## 2017-01-01 RX ADMIN — HYDROMORPHONE HYDROCHLORIDE 2 MG: 2 INJECTION, SOLUTION INTRAMUSCULAR; INTRAVENOUS; SUBCUTANEOUS at 06:16

## 2017-01-01 RX ADMIN — OXYCODONE HYDROCHLORIDE AND ACETAMINOPHEN 1 TABLET: 7.5; 325 TABLET ORAL at 21:56

## 2017-01-01 RX ADMIN — HYDROMORPHONE HYDROCHLORIDE 1 MG: 1 INJECTION, SOLUTION INTRAMUSCULAR; INTRAVENOUS; SUBCUTANEOUS at 08:43

## 2017-01-01 RX ADMIN — OXYCODONE HYDROCHLORIDE AND ACETAMINOPHEN 1 TABLET: 7.5; 325 TABLET ORAL at 07:06

## 2017-01-01 RX ADMIN — SCOPALAMINE 1 PATCH: 1 PATCH, EXTENDED RELEASE TRANSDERMAL at 15:47

## 2017-01-01 RX ADMIN — LEVOTHYROXINE SODIUM 125 MCG: 125 TABLET ORAL at 09:55

## 2017-01-01 RX ADMIN — DILTIAZEM HYDROCHLORIDE 120 MG: 120 CAPSULE, COATED, EXTENDED RELEASE ORAL at 09:56

## 2017-01-01 RX ADMIN — ONDANSETRON 8 MG: 2 INJECTION INTRAMUSCULAR; INTRAVENOUS at 11:38

## 2017-01-01 RX ADMIN — ATROPINE SULFATE 2 DROP: 10 SOLUTION/ DROPS OPHTHALMIC at 11:24

## 2017-01-01 RX ADMIN — Medication: at 10:10

## 2017-01-01 RX ADMIN — DIAZEPAM 2 MG: 2 TABLET ORAL at 05:11

## 2017-01-01 RX ADMIN — SUCRALFATE 1 G: 1 SUSPENSION ORAL at 18:26

## 2017-01-01 RX ADMIN — SCOPALAMINE 1 PATCH: 1 PATCH, EXTENDED RELEASE TRANSDERMAL at 14:38

## 2017-01-01 RX ADMIN — SODIUM CHLORIDE, PRESERVATIVE FREE 500 UNITS: 5 INJECTION INTRAVENOUS at 13:00

## 2017-01-01 RX ADMIN — ONDANSETRON 8 MG: 2 INJECTION INTRAMUSCULAR; INTRAVENOUS at 21:19

## 2017-01-01 RX ADMIN — LORAZEPAM 2 MG: 2 INJECTION INTRAMUSCULAR; INTRAVENOUS at 03:52

## 2017-01-01 RX ADMIN — DIAZEPAM 2 MG: 2 TABLET ORAL at 22:42

## 2017-01-01 RX ADMIN — ONDANSETRON 8 MG: 2 INJECTION INTRAMUSCULAR; INTRAVENOUS at 22:50

## 2017-01-01 RX ADMIN — HYDROMORPHONE HYDROCHLORIDE 1 MG: 1 INJECTION, SOLUTION INTRAMUSCULAR; INTRAVENOUS; SUBCUTANEOUS at 05:56

## 2017-01-01 RX ADMIN — PANTOPRAZOLE SODIUM 40 MG: 40 TABLET, DELAYED RELEASE ORAL at 08:40

## 2017-01-01 RX ADMIN — LEVOTHYROXINE SODIUM 125 MCG: 125 TABLET ORAL at 05:56

## 2017-01-01 RX ADMIN — LORAZEPAM 2 MG: 2 INJECTION INTRAMUSCULAR; INTRAVENOUS at 18:34

## 2017-01-01 RX ADMIN — FENTANYL 1 PATCH: 50 PATCH, EXTENDED RELEASE TRANSDERMAL at 18:09

## 2017-01-01 RX ADMIN — Medication: at 12:08

## 2017-01-01 RX ADMIN — HYDROCHLOROTHIAZIDE 25 MG: 25 TABLET ORAL at 09:56

## 2017-01-01 RX ADMIN — LORAZEPAM 2 MG: 2 INJECTION INTRAMUSCULAR; INTRAVENOUS at 21:19

## 2017-01-01 RX ADMIN — MEGESTROL ACETATE 400 MG: 40 SUSPENSION ORAL at 08:40

## 2017-01-01 RX ADMIN — ESCITALOPRAM OXALATE 10 MG: 10 TABLET ORAL at 09:55

## 2017-01-01 RX ADMIN — DILTIAZEM HYDROCHLORIDE 120 MG: 120 CAPSULE, COATED, EXTENDED RELEASE ORAL at 08:19

## 2017-01-01 RX ADMIN — LABETALOL HYDROCHLORIDE 100 MG: 100 TABLET, FILM COATED ORAL at 08:22

## 2017-01-01 RX ADMIN — ONDANSETRON 4 MG: 4 TABLET, FILM COATED ORAL at 10:01

## 2017-01-01 RX ADMIN — SODIUM CHLORIDE, PRESERVATIVE FREE 500 UNITS: 5 INJECTION INTRAVENOUS at 11:54

## 2017-01-01 RX ADMIN — MORPHINE SULFATE 1 MG: 2 INJECTION, SOLUTION INTRAMUSCULAR; INTRAVENOUS at 22:52

## 2017-01-01 RX ADMIN — PANTOPRAZOLE SODIUM 40 MG: 40 TABLET, DELAYED RELEASE ORAL at 08:44

## 2017-01-01 RX ADMIN — DILTIAZEM HYDROCHLORIDE 120 MG: 120 CAPSULE, COATED, EXTENDED RELEASE ORAL at 08:22

## 2017-01-01 RX ADMIN — LABETALOL HYDROCHLORIDE 100 MG: 100 TABLET, FILM COATED ORAL at 17:16

## 2017-01-01 RX ADMIN — Medication 10 ML: at 09:40

## 2017-01-01 RX ADMIN — LABETALOL HYDROCHLORIDE 100 MG: 100 TABLET, FILM COATED ORAL at 17:11

## 2017-01-01 RX ADMIN — ESCITALOPRAM OXALATE 10 MG: 10 TABLET ORAL at 08:45

## 2017-01-01 RX ADMIN — ATORVASTATIN CALCIUM 20 MG: 20 TABLET, FILM COATED ORAL at 17:34

## 2017-01-01 RX ADMIN — SUCRALFATE 1 G: 1 SUSPENSION ORAL at 10:59

## 2017-01-01 RX ADMIN — ONDANSETRON 8 MG: 2 INJECTION INTRAMUSCULAR; INTRAVENOUS at 18:50

## 2017-01-01 RX ADMIN — Medication 10 ML: at 09:59

## 2017-01-01 RX ADMIN — LEVOTHYROXINE SODIUM 125 MCG: 125 TABLET ORAL at 05:41

## 2017-01-01 RX ADMIN — HYDROCHLOROTHIAZIDE 25 MG: 25 TABLET ORAL at 08:22

## 2017-01-01 RX ADMIN — ESCITALOPRAM OXALATE 10 MG: 10 TABLET ORAL at 08:57

## 2017-01-01 RX ADMIN — LABETALOL HYDROCHLORIDE 100 MG: 100 TABLET, FILM COATED ORAL at 08:20

## 2017-01-01 RX ADMIN — HYDROMORPHONE HYDROCHLORIDE 1 MG: 1 INJECTION, SOLUTION INTRAMUSCULAR; INTRAVENOUS; SUBCUTANEOUS at 12:08

## 2017-01-01 RX ADMIN — OXYCODONE HYDROCHLORIDE 5 MG: 5 TABLET ORAL at 14:21

## 2017-01-01 RX ADMIN — LORAZEPAM 2 MG: 2 INJECTION INTRAMUSCULAR; INTRAVENOUS at 03:28

## 2017-01-01 RX ADMIN — LORAZEPAM 2 MG: 2 INJECTION INTRAMUSCULAR; INTRAVENOUS at 23:40

## 2017-01-01 RX ADMIN — LORAZEPAM 0.5 MG: 2 INJECTION, SOLUTION INTRAMUSCULAR; INTRAVENOUS at 10:07

## 2017-01-01 RX ADMIN — SODIUM CHLORIDE: 900 INJECTION, SOLUTION INTRAVENOUS at 12:50

## 2017-01-01 RX ADMIN — MORPHINE SULFATE 2 MG: 2 INJECTION, SOLUTION INTRAMUSCULAR; INTRAVENOUS at 12:19

## 2017-01-01 RX ADMIN — Medication 10 ML: at 08:16

## 2017-01-01 RX ADMIN — LORAZEPAM 2 MG: 2 INJECTION INTRAMUSCULAR; INTRAVENOUS at 23:20

## 2017-01-01 RX ADMIN — IOPAMIDOL 50 ML: 612 INJECTION, SOLUTION INTRAVENOUS at 22:00

## 2017-01-01 RX ADMIN — DIAZEPAM 5 MG: 5 INJECTION, SOLUTION INTRAMUSCULAR; INTRAVENOUS at 14:59

## 2017-01-01 RX ADMIN — LABETALOL HYDROCHLORIDE 100 MG: 100 TABLET, FILM COATED ORAL at 09:56

## 2017-01-01 RX ADMIN — ONDANSETRON 4 MG: 4 TABLET, FILM COATED ORAL at 07:06

## 2017-01-01 RX ADMIN — HYDROCHLOROTHIAZIDE 25 MG: 25 TABLET ORAL at 08:44

## 2017-01-01 RX ADMIN — OXYCODONE HYDROCHLORIDE AND ACETAMINOPHEN 1 TABLET: 7.5; 325 TABLET ORAL at 16:43

## 2017-01-01 RX ADMIN — HYDROMORPHONE HYDROCHLORIDE 1 MG: 1 INJECTION, SOLUTION INTRAMUSCULAR; INTRAVENOUS; SUBCUTANEOUS at 08:18

## 2017-01-01 RX ADMIN — ONDANSETRON 8 MG: 2 INJECTION INTRAMUSCULAR; INTRAVENOUS at 11:35

## 2017-01-01 RX ADMIN — LABETALOL HYDROCHLORIDE 100 MG: 100 TABLET, FILM COATED ORAL at 08:12

## 2017-01-01 RX ADMIN — LORAZEPAM 2 MG: 2 INJECTION INTRAMUSCULAR; INTRAVENOUS at 20:52

## 2017-01-01 RX ADMIN — ATROPINE SULFATE 2 DROP: 10 SOLUTION/ DROPS OPHTHALMIC at 06:17

## 2017-01-01 RX ADMIN — MORPHINE SULFATE 1 MG: 2 INJECTION, SOLUTION INTRAMUSCULAR; INTRAVENOUS at 13:08

## 2017-01-01 RX ADMIN — ONDANSETRON 4 MG: 4 TABLET, FILM COATED ORAL at 18:25

## 2017-01-01 RX ADMIN — SODIUM CHLORIDE 10 ML/HR: 900 INJECTION, SOLUTION INTRAVENOUS at 15:47

## 2017-01-01 RX ADMIN — PROMETHAZINE HYDROCHLORIDE 12.5 MG: 25 INJECTION, SOLUTION INTRAMUSCULAR; INTRAVENOUS at 08:39

## 2017-01-01 RX ADMIN — LABETALOL HYDROCHLORIDE 100 MG: 100 TABLET, FILM COATED ORAL at 08:57

## 2017-01-01 RX ADMIN — ONDANSETRON 8 MG: 2 INJECTION INTRAMUSCULAR; INTRAVENOUS at 08:46

## 2017-01-01 RX ADMIN — HYDROMORPHONE HYDROCHLORIDE 1 MG: 1 INJECTION, SOLUTION INTRAMUSCULAR; INTRAVENOUS; SUBCUTANEOUS at 13:37

## 2017-01-01 RX ADMIN — Medication: at 07:48

## 2017-01-01 RX ADMIN — SUCRALFATE 1 G: 1 SUSPENSION ORAL at 06:16

## 2017-01-01 RX ADMIN — ATORVASTATIN CALCIUM 20 MG: 20 TABLET, FILM COATED ORAL at 21:53

## 2017-01-01 RX ADMIN — Medication 10 ML: at 14:06

## 2017-01-01 RX ADMIN — Medication: at 21:30

## 2017-01-01 RX ADMIN — LABETALOL HYDROCHLORIDE 100 MG: 100 TABLET, FILM COATED ORAL at 17:38

## 2017-01-01 RX ADMIN — MEGESTROL ACETATE 400 MG: 40 SUSPENSION ORAL at 08:22

## 2017-01-01 RX ADMIN — PANTOPRAZOLE SODIUM 40 MG: 40 TABLET, DELAYED RELEASE ORAL at 08:45

## 2017-01-01 RX ADMIN — PANTOPRAZOLE SODIUM 40 MG: 40 TABLET, DELAYED RELEASE ORAL at 08:52

## 2017-01-01 RX ADMIN — LORAZEPAM 2 MG: 2 INJECTION INTRAMUSCULAR; INTRAVENOUS at 15:36

## 2017-01-01 RX ADMIN — ONDANSETRON 8 MG: 2 INJECTION INTRAMUSCULAR; INTRAVENOUS at 03:59

## 2017-01-01 RX ADMIN — SUCRALFATE 1 G: 1 SUSPENSION ORAL at 12:26

## 2017-01-01 RX ADMIN — SODIUM CHLORIDE 250 ML: 9 INJECTION, SOLUTION INTRAVENOUS at 10:42

## 2017-01-01 RX ADMIN — LORAZEPAM 2 MG: 2 INJECTION INTRAMUSCULAR; INTRAVENOUS at 15:50

## 2017-01-01 RX ADMIN — DILTIAZEM HYDROCHLORIDE 120 MG: 120 CAPSULE, COATED, EXTENDED RELEASE ORAL at 08:12

## 2017-01-01 RX ADMIN — DEXAMETHASONE SODIUM PHOSPHATE: 10 INJECTION, SOLUTION INTRAMUSCULAR; INTRAVENOUS at 13:08

## 2017-01-01 RX ADMIN — SUCRALFATE 1 G: 1 SUSPENSION ORAL at 06:06

## 2017-01-01 RX ADMIN — ONDANSETRON 8 MG: 2 INJECTION INTRAMUSCULAR; INTRAVENOUS at 22:42

## 2017-01-01 RX ADMIN — POTASSIUM CHLORIDE 40 MEQ: 1500 TABLET, FILM COATED, EXTENDED RELEASE ORAL at 12:58

## 2017-01-01 RX ADMIN — SUCRALFATE 1 G: 1 SUSPENSION ORAL at 17:10

## 2017-01-01 RX ADMIN — SUCRALFATE 1 G: 1 SUSPENSION ORAL at 23:33

## 2017-01-01 RX ADMIN — SODIUM CHLORIDE 100 ML/HR: 9 INJECTION, SOLUTION INTRAVENOUS at 04:22

## 2017-01-01 RX ADMIN — HYDROMORPHONE HYDROCHLORIDE 1 MG: 1 INJECTION, SOLUTION INTRAMUSCULAR; INTRAVENOUS; SUBCUTANEOUS at 15:28

## 2017-01-01 RX ADMIN — WATER 0.5 ML: 1 INJECTION INTRAMUSCULAR; INTRAVENOUS; SUBCUTANEOUS at 12:26

## 2017-01-01 RX ADMIN — ESCITALOPRAM OXALATE 10 MG: 10 TABLET ORAL at 08:20

## 2017-01-01 RX ADMIN — PANTOPRAZOLE SODIUM 40 MG: 40 TABLET, DELAYED RELEASE ORAL at 08:22

## 2017-01-01 RX ADMIN — SODIUM CHLORIDE 100 ML/HR: 9 INJECTION, SOLUTION INTRAVENOUS at 12:08

## 2017-01-01 RX ADMIN — LORAZEPAM 2 MG: 2 INJECTION INTRAMUSCULAR; INTRAVENOUS at 01:58

## 2017-01-01 RX ADMIN — Medication 10 ML: at 12:38

## 2017-01-01 RX ADMIN — DIPHENHYDRAMINE HYDROCHLORIDE 25 MG: 50 INJECTION INTRAMUSCULAR; INTRAVENOUS at 14:28

## 2017-01-01 RX ADMIN — ATORVASTATIN CALCIUM 20 MG: 20 TABLET, FILM COATED ORAL at 17:16

## 2017-01-01 RX ADMIN — LORAZEPAM 2 MG: 2 INJECTION INTRAMUSCULAR; INTRAVENOUS at 10:15

## 2017-01-01 RX ADMIN — ATROPINE SULFATE 2 DROP: 10 SOLUTION/ DROPS OPHTHALMIC at 14:36

## 2017-01-01 RX ADMIN — MORPHINE SULFATE 2 MG: 2 INJECTION, SOLUTION INTRAMUSCULAR; INTRAVENOUS at 05:43

## 2017-01-01 RX ADMIN — MORPHINE SULFATE 2 MG: 2 INJECTION, SOLUTION INTRAMUSCULAR; INTRAVENOUS at 00:58

## 2017-01-01 RX ADMIN — ASPIRIN 81 MG: 81 TABLET ORAL at 09:56

## 2017-01-01 RX ADMIN — MEGESTROL ACETATE 400 MG: 40 SUSPENSION ORAL at 08:20

## 2017-01-01 RX ADMIN — SODIUM CHLORIDE, PRESERVATIVE FREE 500 UNITS: 5 INJECTION INTRAVENOUS at 14:59

## 2017-01-01 RX ADMIN — Medication: at 00:34

## 2017-01-01 RX ADMIN — NIFEDIPINE 120 MG: 10 CAPSULE ORAL at 08:34

## 2017-01-01 RX ADMIN — HYDROMORPHONE HYDROCHLORIDE 1 MG: 1 INJECTION, SOLUTION INTRAMUSCULAR; INTRAVENOUS; SUBCUTANEOUS at 23:56

## 2017-01-01 RX ADMIN — MEGESTROL ACETATE 400 MG: 40 SUSPENSION ORAL at 08:52

## 2017-01-01 RX ADMIN — FENTANYL 1 PATCH: 50 PATCH, EXTENDED RELEASE TRANSDERMAL at 17:34

## 2017-01-01 RX ADMIN — DILTIAZEM HYDROCHLORIDE 120 MG: 120 CAPSULE, COATED, EXTENDED RELEASE ORAL at 08:20

## 2017-01-01 RX ADMIN — ONDANSETRON 8 MG: 2 INJECTION INTRAMUSCULAR; INTRAVENOUS at 00:24

## 2017-01-01 RX ADMIN — POTASSIUM CHLORIDE 60 MEQ: 20 SOLUTION ORAL at 15:09

## 2017-01-01 RX ADMIN — OXYCODONE HYDROCHLORIDE 5 MG: 5 TABLET ORAL at 05:43

## 2017-01-01 RX ADMIN — DIAZEPAM 4 MG: 2 TABLET ORAL at 13:04

## 2017-01-01 RX ADMIN — ONDANSETRON 8 MG: 2 INJECTION INTRAMUSCULAR; INTRAVENOUS at 03:16

## 2017-01-01 RX ADMIN — ONDANSETRON 8 MG: 2 INJECTION INTRAMUSCULAR; INTRAVENOUS at 09:02

## 2017-01-01 RX ADMIN — HYDROCHLOROTHIAZIDE 25 MG: 25 TABLET ORAL at 08:34

## 2017-01-01 RX ADMIN — LORAZEPAM 2 MG: 2 INJECTION INTRAMUSCULAR; INTRAVENOUS at 18:50

## 2017-01-01 RX ADMIN — ONDANSETRON 8 MG: 2 INJECTION INTRAMUSCULAR; INTRAVENOUS at 03:54

## 2017-01-01 RX ADMIN — Medication: at 13:23

## 2017-01-01 RX ADMIN — Medication: at 21:20

## 2017-01-01 RX ADMIN — SODIUM CHLORIDE 100 ML/HR: 9 INJECTION, SOLUTION INTRAVENOUS at 14:17

## 2017-01-01 RX ADMIN — FENTANYL 1 PATCH: 50 PATCH, EXTENDED RELEASE TRANSDERMAL at 17:35

## 2017-01-01 RX ADMIN — LABETALOL HYDROCHLORIDE 100 MG: 100 TABLET, FILM COATED ORAL at 08:45

## 2017-01-01 RX ADMIN — Medication: at 11:29

## 2017-01-01 RX ADMIN — HYDROMORPHONE HYDROCHLORIDE 1 MG: 1 INJECTION, SOLUTION INTRAMUSCULAR; INTRAVENOUS; SUBCUTANEOUS at 12:26

## 2017-01-01 RX ADMIN — HYDROMORPHONE HYDROCHLORIDE 1 MG: 1 INJECTION, SOLUTION INTRAMUSCULAR; INTRAVENOUS; SUBCUTANEOUS at 20:04

## 2017-01-01 RX ADMIN — MORPHINE SULFATE 10 MG: 10 SOLUTION ORAL at 11:02

## 2017-01-01 RX ADMIN — HYDROMORPHONE HYDROCHLORIDE 1 MG: 1 INJECTION, SOLUTION INTRAMUSCULAR; INTRAVENOUS; SUBCUTANEOUS at 07:55

## 2017-01-01 RX ADMIN — MEGESTROL ACETATE 400 MG: 40 SUSPENSION ORAL at 08:18

## 2017-01-01 RX ADMIN — Medication 10 ML: at 14:59

## 2017-01-01 RX ADMIN — SODIUM CHLORIDE 125 MG: 9 INJECTION, SOLUTION INTRAVENOUS at 20:08

## 2017-01-01 RX ADMIN — Medication: at 19:55

## 2017-01-01 RX ADMIN — LABETALOL HYDROCHLORIDE 100 MG: 100 TABLET, FILM COATED ORAL at 08:44

## 2017-01-01 RX ADMIN — Medication: at 22:46

## 2017-01-01 RX ADMIN — ESCITALOPRAM OXALATE 10 MG: 10 TABLET ORAL at 08:52

## 2017-01-01 RX ADMIN — Medication 10 ML: at 14:20

## 2017-01-01 RX ADMIN — LORAZEPAM 2 MG: 2 INJECTION INTRAMUSCULAR; INTRAVENOUS at 18:10

## 2017-01-01 RX ADMIN — LORAZEPAM 2 MG: 2 INJECTION INTRAMUSCULAR; INTRAVENOUS at 13:43

## 2017-01-01 RX ADMIN — MORPHINE SULFATE 1 MG: 2 INJECTION, SOLUTION INTRAMUSCULAR; INTRAVENOUS at 09:07

## 2017-01-01 RX ADMIN — SODIUM CHLORIDE, PRESERVATIVE FREE 500 UNITS: 5 INJECTION INTRAVENOUS at 14:20

## 2017-01-01 RX ADMIN — FENTANYL 1 PATCH: 12 PATCH TRANSDERMAL at 17:34

## 2017-01-01 RX ADMIN — ESCITALOPRAM OXALATE 10 MG: 10 TABLET ORAL at 11:24

## 2017-01-01 RX ADMIN — ONDANSETRON 8 MG: 2 INJECTION INTRAMUSCULAR; INTRAVENOUS at 21:11

## 2017-01-01 RX ADMIN — ONDANSETRON 4 MG: 4 TABLET, FILM COATED ORAL at 19:06

## 2017-01-01 RX ADMIN — LORAZEPAM 2 MG: 2 INJECTION INTRAMUSCULAR; INTRAVENOUS at 11:52

## 2017-01-01 RX ADMIN — HYDROCHLOROTHIAZIDE 25 MG: 25 TABLET ORAL at 08:20

## 2017-01-01 RX ADMIN — Medication: at 01:31

## 2017-01-03 PROBLEM — R22.2 ABDOMINAL WALL MASS: Status: ACTIVE | Noted: 2017-01-01

## 2017-01-03 NOTE — LETTER
January 3, 2017     Kyler Kim MD  [address] [place] [id number]    Patient: Bernadette Camarena   YOB: 1940   Date of Visit: 1/3/2017       Dear Dr. Judy MD:    Thank you for referring Bernadette Camarena to me for evaluation. Below are the relevant portions of my assessment and plan of care.    If you have questions, please do not hesitate to call me. I look forward to following Bernadette along with you.         Sincerely,        New Quesada MD        CC: No Recipients  New Quesada MD  1/3/2017 12:44 PM  Sign at close encounter  76-year-old female now 2 months out from a right colectomy for recurrent right colon cancer.  Patient presents because she feels a not at the top part of her incision.  His noticed this for about 10 days now and is mildly tender to palpation.  Change with Valsalva or coughing and the knot wasn't there previously.  Patient has seen Dr. Booth or medical oncology and has also been to [place] see medical oncology that there.  He was told at [place] that nothing could be done as recurrent colon cancer.    On exam she has a 3 x 2.5 cm subcutaneous mass right at the top of her epigastric incision.  No overlying skin changes and no change with Valsalva.  Abdomen is unremarkable and her incision is well-healed otherwise.    Would be concerned about a possible recurrence a specimen with the fairly quick turnaround previously.  We'll get a CT scan of the abdomen and chest and return to clinic afterwards for likely Tirso-Cut needle biopsy here in the clinic of this mass.

## 2017-01-16 PROBLEM — C18.9 METASTATIC COLON CANCER IN FEMALE: Status: ACTIVE | Noted: 2017-01-01

## 2017-01-16 NOTE — PROGRESS NOTES
See prior note.  She returns after CT scan of chest and abdomen.  I have reviewed the films as well as the report.  Noting the palpable area in her epigastrium is most likely metastatic disease.  It was not there in October on CT scan or by physical exam patient also has other nodules in her scar that are new as well as one area in her spleen and also smaller nodules in her right pelvis all of which suggest metastatic disease.  Has no symptoms associated with disease this time.  I spoke with Dr. Booth for medical oncology and he agrees to see her this week and likely get her started on chemotherapy ASAP.  My office will get her in to see Dr. Booth and she'll follow up with us on a when necessary basis

## 2017-01-16 NOTE — LETTER
January 16, 2017     Kyler Kim MD  [address] [place] [id number]    Patient: Bernadette Camarena   YOB: 1940   Date of Visit: 1/16/2017       Dear Dr. Judy MD:    Thank you for referring Bernadette Camarena to me for evaluation. Below are the relevant portions of my assessment and plan of care.    If you have questions, please do not hesitate to call me. I look forward to following Bernadette along with you.         Sincerely,        New Quesada MD        CC: No Recipients  New Quesada MD  1/16/2017  2:14 PM  Sign at close encounter  See prior note.  She returns after CT scan of chest and abdomen.  I have reviewed the films as well as the report.  Noting the palpable area in her epigastrium is most likely metastatic disease.  It was not there in October on CT scan or by physical exam patient also has other nodules in her scar that are new as well as one area in her spleen and also smaller nodules in her right pelvis all of which suggest metastatic disease.  Has no symptoms associated with disease this time.  I spoke with Dr. Booth for medical oncology and he agrees to see her this week and likely get her started on chemotherapy ASAP.  My office will get her in to see Dr. Booth and she'll follow up with us on a when necessary basis

## 2017-01-23 NOTE — TELEPHONE ENCOUNTER
----- Message from Glenys Holden sent at 1/23/2017 12:50 PM CST -----  DR ROMERO SAID IT'S OK FOR HER TO START CHEMO.  PT#[phone]

## 2017-01-23 NOTE — TELEPHONE ENCOUNTER
Called pt back. Pt saw Dr. Peck this morning and per pt, is ok to start chemo. Informed her that we will wait to get note from Dr. Peck's office, hopefully tomorrow. Will inform Dr. Booth.

## 2017-01-24 NOTE — TELEPHONE ENCOUNTER
Called patient. Per Dr. Booth, we will start pt on treatment on either Thursday or Friday pending precert. Will find out in morning if auth is ok for plan and call pt and give appt for either Thursday or Friday.

## 2017-01-24 NOTE — TELEPHONE ENCOUNTER
----- Message from Glenys Holden sent at 1/24/2017  9:45 AM CST -----  Regarding: STARTING CHEMO   SHE SEEN A DOC IN [place] YESTERDAY. THE CANCER IS RAPID. LYNDON CÁRDENAS (DAUGHTER OR D.I.L.)  WANTS TO START CHEMO ASAP. HER NEXT APPT WITH KIM IS 1-31-17.   DOES SHE NEED TO SEE KIM BEFORE STARTING CHEMO.   [phone] [place]  [phone] [place]

## 2017-01-27 NOTE — PROGRESS NOTES
DATE OF VISIT: 1/27/2017    REASON FOR VISIT: Metastatic colon adenocarcinoma starting Opdivo.    HISTORY OF PRESENT ILLNESS:    76-year-old female with a past medical history significant for stage III colon cancer, T4 N2 M0 mucinous adenocarcinoma initially was diagnosed in February 2016.  At that point she had a hemicolectomy followed by left modified FOLFOX-64 total 8 cycles last of which was in August 2016.  Due to side effects from the chemotherapy and recurrent diverticulitis and worsening performance status patient was taken off chemotherapy at that point by Dr. Rodriguez.  Patient again was seen in clinic in October 2016 at that point in view of abdominal pain and developing iron deficiency anemia patient had a restaging CAT scan done which showed there was a recurrence at the site of anastomosis.  Subsequently patient had a total colectomy and patient was seen at [facility] after that for a second opinion.  In view of PET scan being negative right parietal colectomy and no evidence of disease recommendation was to close observation for recurrence.  In around Christmas of 2016 patient started feeling the lump on her anterior abdominal wall at the site of surgical incision and again was seen by Dr. Quesada had a restaging workup done.  Unfortunately it showed there was a recurrence with a metastasis involving the pelvic sidewall as well as anterior abdominal wall.  Patient's tumors Foundation 1 testing was done at [facility], it showed patient's tumor is MSI high and Dr. Peck who is the GI oncologist at [facility] recommended to start patient on Opdivo because of the aggressive nature and MSI being high.  Patient is here to start her first cycle of Opdivo today.  Complains of abdominal pain for which she is taking Norco 7.5/325 one tablet daily.  Complains of nausea after starting Norco.  Denies any blood in the stool.  Denies any new headache dizziness.    PAST MEDICAL HISTORY:    Past Medical History    Diagnosis Date   • Acute sinusitis    • Anemia    • Anxiety    • Artificial lens present    • Cystitis    • Disease of thyroid gland    • Diverticular disease    • Diverticulitis of colon    • Dysphagia    • Dysuria    • GERD (gastroesophageal reflux disease)      With dysphagia   • Heart murmur    • History of malignant neoplasm of colon      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 6 of 24 lymph nodes positive   • Hordeolum    • Hyperlipidemia    • Hypertension    • Peripheral venous insufficiency    • Posterior subcapsular polar senile cataract      Left   • Stricture of esophagus    • URI (upper respiratory infection)    • UTI (urinary tract infection)    • Visual discomfort      Left       SOCIAL HISTORY:    Social History   Substance Use Topics   • Smoking status: Never Smoker   • Smokeless tobacco: Never Used   • Alcohol use No       Surgical History :  Past Surgical History   Procedure Laterality Date   •  section     • Hysterectomy     • Carpal tunnel release  2014     Left carpal tunnel release   • Cholecystectomy  2016     Laparoscopic converted to open hemicolectomy. Right colon cancer   • Endoscopy and colonoscopy  2015     Internal & external hemorrhoids found. Diverticulum in sigmoid colon & descending colon   • Colonoscopy  02/10/2016     No specimens collected. Mild diverticulosis in the sigmoid colon.Malignant appearing tumor in the colon. Biopsied   • Esophagoscopy / egd  02/10/2016     With biopsy-Benign appearing esophageal stenosis.Chronic gastritis.Biopsied.Normal examined duodenum   • Esophagoscopy / egd  2015     with tube-Esophageal stricture present. Dilatation performed. Gastritis in stomach. Biopsy taken. Normal duodenum   • Venous access device (port) insertion  03/10/2016     Ultrasound guided right Mediport placement under fluoroscopy   • Cataract extraction  10/15/2015     Cataract extraction with intraocular lens implantation,  "left eye       ALLERGIES:    Allergies   Allergen Reactions   • Latex        REVIEW OF SYSTEMS:      CONSTITUTIONAL:  No fever, chills, or night sweats.     HEENT:  No epistaxis, mouth sores, or difficulty swallowing.    RESPIRATORY:  Denies any shortness of breath or cough at present.    CARDIOVASCULAR:  No chest pain or palpitations.    GASTROINTESTINAL: Complains of abdominal pain at the site of lump in anterior abdominal wall.  Complains of nausea since starting Norco.  Denies any blood in the stool or diarrhea.    GENITOURINARY:  No dysuria or hematuria.    MUSCULOSKELETAL:  Complains of chronic lower back pain.    NEUROLOGICAL:  Due for chronic tingling and numbness affecting upper extremity and lower extremity since chemotherapy. No new headache or dizziness.     LYMPHATICS:  Denies any abnormal swollen and anywhere in the body.    SKIN:  Positive for lumps and nodules on the anterior abdominal wall around surgical site.    PHYSICAL EXAMINATION:      VITAL SIGNS:    Visit Vitals   • /60   • Pulse 74   • Temp 96.6 °F (35.9 °C)   • Resp 16   • Ht 65\" (165.1 cm)   • Wt 215 lb (97.5 kg)   • BMI 35.78 kg/m2       GENERAL:  Not in any distress.     EYES:  Mild pallor. No icterus.    NECK:  No adenopathy.    RESPIRATORY:  Fair air entry bilaterally. No rhonchi or wheezing.    CARDIOVASCULAR:  S1, S2. Regular rate and rhythm.    ABDOMEN:  Soft, obese, there is 3 x 2 cm lump that can be palpable to the anterior abdominal wall next to the midline surgical incision.  Bowel sounds present.    EXTREMITIES:  No edema.    NEUROLOGICAL:  Alert, awake, oriented x3. No deficits.    DIAGNOSTIC DATA:    GLUCOSE   Date Value Ref Range Status   01/27/2017 150 (H) 60 - 100 mg/dL Final   01/17/2017 102 (H) 60 - 100 mg/dl Final     SODIUM   Date Value Ref Range Status   01/27/2017 138 137 - 145 mmol/L Final   01/17/2017 137 137 - 145 mmol/L Final     POTASSIUM   Date Value Ref Range Status   01/27/2017 3.8 3.5 - 5.1 mmol/L " Final   01/17/2017 3.5 3.5 - 5.1 mmol/L Final     CO2   Date Value Ref Range Status   01/27/2017 27.0 22.0 - 31.0 mmol/L Final   01/17/2017 27 22 - 31 mmol/L Final     CHLORIDE   Date Value Ref Range Status   01/27/2017 96 95 - 110 mmol/L Final   01/17/2017 97 95 - 110 mmol/L Final     ANION GAP   Date Value Ref Range Status   01/27/2017 15.0 5.0 - 15.0 mmol/L Final   01/17/2017 13.0 5.0 - 15.0 mmol/L Final     CREATININE   Date Value Ref Range Status   01/27/2017 0.79 0.50 - 1.00 mg/dL Final   01/17/2017 1.0 0.5 - 1.0 mg/dl Final     BUN   Date Value Ref Range Status   01/27/2017 13 7 - 21 mg/dL Final   01/17/2017 15 7 - 21 mg/dl Final     BUN/CREATININE RATIO   Date Value Ref Range Status   01/27/2017 16.5 7.0 - 25.0 Final     CALCIUM   Date Value Ref Range Status   01/27/2017 9.4 8.4 - 10.2 mg/dL Final   01/17/2017 9.4 8.4 - 10.2 mg/dl Final     EGFR NON  AMER   Date Value Ref Range Status   01/27/2017 71 39 - 90 mL/min/1.73 Final     ALKALINE PHOSPHATASE   Date Value Ref Range Status   01/27/2017 115 38 - 126 U/L Final   01/17/2017 130 (H) 38 - 126 U/L Final     TOTAL PROTEIN   Date Value Ref Range Status   01/27/2017 6.8 6.3 - 8.6 g/dL Final   01/17/2017 6.8 6.3 - 8.6 gm/dl Final     ALT (SGPT)   Date Value Ref Range Status   01/27/2017 23 9 - 52 U/L Final   01/17/2017 37 9 - 52 U/L Final     AST (SGOT)   Date Value Ref Range Status   01/27/2017 18 14 - 36 U/L Final   01/17/2017 24 14 - 36 U/L Final     TOTAL BILIRUBIN   Date Value Ref Range Status   01/27/2017 0.5 0.2 - 1.3 mg/dL Final   01/17/2017 0.6 0.2 - 1.3 mg/dl Final     ALBUMIN   Date Value Ref Range Status   01/27/2017 4.30 3.40 - 4.80 g/dL Final   01/17/2017 4.1 3.4 - 4.8 gm/dl Final     GLOBULIN   Date Value Ref Range Status   01/27/2017 2.5 2.3 - 3.5 gm/dL Final     A/G RATIO   Date Value Ref Range Status   01/27/2017 1.7 1.1 - 1.8 g/dL Final     Lab Results   Component Value Date    WBC 8.75 01/27/2017    HGB 12.1 01/27/2017    HCT 36.6  01/27/2017    MCV 83.9 01/27/2017     01/27/2017     Lab Results   Component Value Date    NEUTROABS 6.04 01/27/2017    IRON 42 01/17/2017    TIBC 359 01/17/2017    LABIRON 11.7 (L) 01/17/2017    FERRITIN 43.1 01/17/2017    TRENFEUJ20 523 10/24/2016    FOLATE 13.70 10/24/2016           RADIOLOGY DATA :    CAT scan of chest abdomen and pelvis with intravenous contrast done on January 12, 2017 was reviewed and discussed with the patient it showed:    IMPRESSION-   1.Postsurgical changes consistent with prior right hemicolectomy.  2.Multiple anterior abdominal wall scar rim-enhancing lesion  suspicious for metastatic involvement in the region of the abdominal  scar.  3.Multiple ring-enhancing fluid necrotic metastatic foci are seen in  the right hemicolectomy site, adjacent to the distal small bowel, and  in the abdominal and pelvis central fatty mesentery, along the  periphery splenic capsule consistent with worsening metastatic  involvement.  4.Remainder CT chest abdomen pelvis study unremarkable.      ASSESSMENT AND PLAN:       1.  Recurrent metastatic colon cancer.  Patient initially was diagnosed with a stage III disease T4 N2 M0 in February 2016.  Patient initially had a hemicolectomy followed by adjuvant FOLFOX 6 for 8 cycles last of which was in August 2016.  Chemotherapy was held at that point due to worsening performance status side effects as well as recurrent hospital admission.  In October 2016 she had a recurrence at the site of anastomosis for which Dr. Quesada did total colectomy.  After that patient was seen at [facility] for a second opinion, due to PET scan being negative for any metastatic disease except for the site of recurrence the recommended close monitoring for recurrence.  In December 2016 around [place] she started feeling a lump around anterior abdominal wall next to the surgical incision for which Dr. Quesada did a CAT scan of chest abdomen and pelvis on January 12, 2017, it  showed there was a recurrence and metastatic disease involving anterior abdominal wall mesentery as well as spleen and pelvic sidewall.  For which patient was again seen by Dr. Peck at [facility] and recommendation was to start her on Opdivo due to tumor having MSI high status.  We will start her with Opdivo for cycle today.  Patient was explained side effect of Opdivo including pneumonitis, hepatitis, colitis, hypophysitis.  We will also provided patient information to read about Opdivo today.  Patient was instructed to come to the emergency room if she has a temperature greater than 100.4 .  Patient was also explained today, Opdivo is not currently FDA approved for colon cancer but there are ongoing trial with a phase II study showing that is a good result in patient with MSI high colon cancer.  She understands that and is willing to undergo treatment with Opdivo.    2.  Anemia due to iron deficiency due to colon cancer.  Status post Feraheme in the past.  Her hemoglobin is 12 to do so we will continue with monitoring CBC for now.    3.  Prescription: Patient is requesting a refill on [place] 7.5/325 today we will give her 90 tablets today.  she has enough nausea medication with her for now.    4.  Health maintenance: Patient does not smoke.  Had a colonoscopy done in October 2016.  She remains full code.  Irving Booth MD  1/27/2017  9:41 AM

## 2017-01-27 NOTE — MR AVS SNAPSHOT
Bernadette Camarena   1/27/2017 9:00 AM   Office Visit    Dept Phone:  [phone]   Encounter #:  [id number]    Provider:  Irving Booth MD   Department:  [facility] HEMATOLOGY AND ONCOLOGY                Your Full Care Plan              Today's Medication Changes          These changes are accurate as of: 1/27/17 12:35 PM.  If you have any questions, ask your nurse or doctor.               Medication(s)that have changed:     * HYDROcodone-acetaminophen 7.5-325 MG per tablet   Commonly known as:  NORCO   What changed:  Another medication with the same name was added. Make sure you understand how and when to take each.   Changed by:  New Quesada MD       * HYDROcodone-acetaminophen 7.5-325 MG per tablet   Commonly known as:  NORCO   Take 1 tablet by mouth Every 6 (Six) Hours As Needed for moderate pain (4-6).   What changed:  You were already taking a medication with the same name, and this prescription was added. Make sure you understand how and when to take each.   Changed by:  Irving Booth MD       * Notice:  This list has 2 medication(s) that are the same as other medications prescribed for you. Read the directions carefully, and ask your doctor or other care provider to review them with you.         Where to Get Your Medications      You can get these medications from any pharmacy     Bring a paper prescription for each of these medications     HYDROcodone-acetaminophen 7.5-325 MG per tablet                  Your Updated Medication List          This list is accurate as of: 1/27/17 12:35 PM.  Always use your most recent med list.                aspirin 81 MG EC tablet       atorvastatin 20 MG tablet   Commonly known as:  LIPITOR       calcium carbonate-cholecalciferol 500-400 MG-UNIT tablet tablet       CENTRUM SILVER ULTRA WOMENS PO       diltiaZEM  MG 24 hr capsule   Commonly known as:  CARDIZEM CD       escitalopram 10 MG tablet   Commonly known as:   LEXAPRO       ferrous sulfate 325 (65 FE) MG tablet       hydrochlorothiazide 25 MG tablet   Commonly known as:  HYDRODIURIL       * HYDROcodone-acetaminophen 7.5-325 MG per tablet   Commonly known as:  NORCO       * HYDROcodone-acetaminophen 7.5-325 MG per tablet   Commonly known as:  NORCO   Take 1 tablet by mouth Every 6 (Six) Hours As Needed for moderate pain (4-6).       labetalol 100 MG tablet   Commonly known as:  NORMODYNE       levothyroxine 150 MCG tablet   Commonly known as:  SYNTHROID, LEVOTHROID       metroNIDAZOLE 500 MG tablet   Commonly known as:  FLAGYL       neomycin 500 MG tablet   Commonly known as:  MYCIFRADIN       ondansetron 4 MG tablet   Commonly known as:  ZOFRAN       pantoprazole 40 MG EC tablet   Commonly known as:  PROTONIX       potassium chloride 20 MEQ CR tablet   Commonly known as:  K-DUR,KLOR-CON       * Notice:  This list has 2 medication(s) that are the same as other medications prescribed for you. Read the directions carefully, and ask your doctor or other care provider to review them with you.            You Were Diagnosed With        Codes Comments    Malignant neoplasm of hepatic flexure     ICD-10-CM: C18.3  ICD-9-CM: 153.0       Instructions     None    Patient Instructions History      Upcoming Appointments     Visit Type Date Time Department    FOLLOW UP 1/27/2017  9:00 AM [facility] ONC [place]    VAD FLUSH 1/27/2017  8:45 AM  [facility] OP INFUSION    INFUSION 2 HR 1/27/2017  9:45 AM  [facility] OP INFUSION    FOLLOW UP 2/10/2017  8:15 AM [facility] ONC [place]    VAD FLUSH 2/10/2017  7:45 AM  [facility] OP INFUSION    INFUSION 2 HR 2/10/2017  8:45 AM  [facility] OP INFUSION    FOLLOW UP - ONCOLOGY 3/3/2017 10:00 AM [facility] RAD ONC [facility]    FOLLOW UP 8/17/2017 10:30 AM [facility] SLEEP CENTER [facility]      MyChart Signup     [facility] allows you to send messages to your doctor, view your test results, renew your prescriptions, schedule appointments, and more. To sign up, go to  "[email or web address] and click on the Sign Up Now link in the New User? box. Enter your [employer] Activation Code exactly as it appears below along with the last four digits of your Social Security Number and your Date of Birth () to complete the sign-up process. If you do not sign up before the expiration date, you must request a new code.    [employer] Activation Code: [email or web address]  Expires: 2017  2:07 PM    If you have questions, you can email [email or web address] or call [phone] to talk to our [employer] staff. Remember, [employer] is NOT to be used for urgent needs. For medical emergencies, dial 911.               Other Info from Your Visit           Your Appointments     Feb 10, 2017  7:45 AM CST   VAD FLUSH with NURSE [facility] OP INFUSION ([address] [address]   [phone]            Feb 10, 2017  8:15 AM CST   Follow Up with Irving Booth MD   [facility] HEMATOLOGY AND ONCOLOGY ([address] [address]   [phone]           Arrive 15 minutes prior to appointment.            Feb 10, 2017  8:45 AM CST   INFUSION with Health system OP INFU BED 2   [facility] OP INFUSION ([address] [address]   [phone]            Mar 03, 2017 10:00 AM CST   FOLLOW UP with Patrick Benitez MD   [facility] RADIATION ONCOLOGY (--)    [address] [place] [id number]   [phone]            Aug 17, 2017 10:30 AM CDT   Follow Up with SLEEP CLINIC [facility] (--)    [address] [place] [id number]   [phone]           Arrive 15 minutes prior to appointment.              Allergies     Latex        Reason for Visit     Follow-up     Vomiting from pain pill per pt      Vital Signs     Blood Pressure Pulse Temperature Respirations Height Weight    137/60 74 96.6 °F (35.9 °C) 16 65\" " (165.1 cm) 215 lb (97.5 kg)    Body Mass Index Smoking Status                35.78 kg/m2 Never Smoker          Problems and Diagnoses Noted     Colon cancer

## 2017-02-11 NOTE — PROGRESS NOTES
DATE OF VISIT: 2/10/2017    REASON FOR VISIT: Metastatic colon adenocarcinoma starting Opdivo.    HISTORY OF PRESENT ILLNESS:    76-year-old female with a past medical history significant for stage III colon cancer, T4 N2 M0 mucinous adenocarcinoma initially was diagnosed in February 2016.  At that point she had a hemicolectomy followed by left modified FOLFOX-64 total 8 cycles last of which was in August 2016.  Due to side effects from the chemotherapy and recurrent diverticulitis and worsening performance status patient was taken off chemotherapy at that point by Dr. Rodriguez.  Patient again was seen in clinic in October 2016 at that point in view of abdominal pain and developing iron deficiency anemia patient had a restaging CAT scan done which showed there was a recurrence at the site of anastomosis.  Subsequently patient had a total colectomy and patient was seen at [facility] after that for a second opinion.  In view of PET scan being negative right parietal colectomy and no evidence of disease recommendation was to close observation for recurrence.  In around Christmas of 2016 patient started feeling the lump on her anterior abdominal wall at the site of surgical incision and again was seen by Dr. Quesada had a restaging workup done.  Unfortunately it showed there was a recurrence with a metastasis involving the pelvic sidewall as well as anterior abdominal wall.  Patient's tumors Foundation 1 testing was done at [facility], it showed patient's tumor is MSI high and Dr. Peck who is the GI oncologist at [facility] recommended to start patient on Opdivo because of the aggressive nature and MSI being high.  Patient is here to start her second cycle of Opdivo today.  Complains of abdominal pain for which she is taking Norco 7.5/325 one tablet daily.  Complains of fatigue. Denies any blood in the stool.  Denies any new headache or  dizziness.    PAST MEDICAL HISTORY:    Past Medical History   Diagnosis Date   •  Acute sinusitis    • Anemia    • Anxiety    • Artificial lens present    • Cystitis    • Disease of thyroid gland    • Diverticular disease    • Diverticulitis of colon    • Dysphagia    • Dysuria    • GERD (gastroesophageal reflux disease)      With dysphagia   • Heart murmur    • History of malignant neoplasm of colon      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 6 of 24 lymph nodes positive   • Hordeolum    • Hyperlipidemia    • Hypertension    • Peripheral venous insufficiency    • Posterior subcapsular polar senile cataract      Left   • Stricture of esophagus    • URI (upper respiratory infection)    • UTI (urinary tract infection)    • Visual discomfort      Left       SOCIAL HISTORY:    Social History   Substance Use Topics   • Smoking status: Never Smoker   • Smokeless tobacco: Never Used   • Alcohol use No       Surgical History :  Past Surgical History   Procedure Laterality Date   •  section     • Hysterectomy     • Carpal tunnel release  2014     Left carpal tunnel release   • Cholecystectomy  2016     Laparoscopic converted to open hemicolectomy. Right colon cancer   • Endoscopy and colonoscopy  2015     Internal & external hemorrhoids found. Diverticulum in sigmoid colon & descending colon   • Colonoscopy  02/10/2016     No specimens collected. Mild diverticulosis in the sigmoid colon.Malignant appearing tumor in the colon. Biopsied   • Esophagoscopy / egd  02/10/2016     With biopsy-Benign appearing esophageal stenosis.Chronic gastritis.Biopsied.Normal examined duodenum   • Esophagoscopy / egd  2015     with tube-Esophageal stricture present. Dilatation performed. Gastritis in stomach. Biopsy taken. Normal duodenum   • Venous access device (port) insertion  03/10/2016     Ultrasound guided right Mediport placement under fluoroscopy   • Cataract extraction  10/15/2015     Cataract extraction with intraocular lens implantation, left eye  "      ALLERGIES:    Allergies   Allergen Reactions   • Latex        REVIEW OF SYSTEMS:      CONSTITUTIONAL: Positive for fatigue.  No fever, chills, or night sweats.     HEENT:  No epistaxis, mouth sores, or difficulty swallowing.    RESPIRATORY:  Denies any shortness of breath or cough at present.    CARDIOVASCULAR:  No chest pain or palpitations.    GASTROINTESTINAL: Complains of abdominal pain at the site of lump in anterior abdominal wall.  Complains of nausea since starting Norco.  Denies any blood in the stool or diarrhea.    GENITOURINARY:  No dysuria or hematuria.    MUSCULOSKELETAL:  Complains of chronic lower back pain.    NEUROLOGICAL:  Positive for chronic tingling and numbness affecting upper extremity and lower extremity since chemotherapy. No new headache or dizziness.     LYMPHATICS:  Denies any abnormal swollen and anywhere in the body.    SKIN:  Positive for lumps and nodules on the anterior abdominal wall around surgical site.    PHYSICAL EXAMINATION:      VITAL SIGNS:    Visit Vitals   • /62   • Pulse 79   • Temp 96.9 °F (36.1 °C)   • Resp 18   • Ht 65\" (165.1 cm)   • Wt 212 lb (96.2 kg)   • BMI 35.28 kg/m2       GENERAL:  Not in any distress.     EYES:  Mild pallor. No icterus.    NECK:  No adenopathy in cervical or supraclavicular.    RESPIRATORY:  Fair air entry bilaterally. No rhonchi or wheezing.    CARDIOVASCULAR:  S1, S2. Regular rate and rhythm.    ABDOMEN:  Soft, obese, there is 4 x 3 cm lump that can be palpable to the anterior abdominal wall next to the midline surgical incision.  There is a new lump about 2 x 2 centimeter palpable in the right lower quadrant.  Bowel sounds present.    EXTREMITIES:  No edema.  No calf tenderness.    NEUROLOGICAL:  Alert, awake, oriented x3. No other or sensory deficits.  Cranial nerves 2-12 grossly intact.    DIAGNOSTIC DATA:    GLUCOSE   Date Value Ref Range Status   02/10/2017 133 (H) 60 - 100 mg/dL Final   01/17/2017 102 (H) 60 - 100 mg/dl " Final     SODIUM   Date Value Ref Range Status   02/10/2017 136 (L) 137 - 145 mmol/L Final   01/17/2017 137 137 - 145 mmol/L Final     POTASSIUM   Date Value Ref Range Status   02/10/2017 3.6 3.5 - 5.1 mmol/L Final   01/17/2017 3.5 3.5 - 5.1 mmol/L Final     CO2   Date Value Ref Range Status   02/10/2017 31.0 22.0 - 31.0 mmol/L Final   01/17/2017 27 22 - 31 mmol/L Final     CHLORIDE   Date Value Ref Range Status   02/10/2017 94 (L) 95 - 110 mmol/L Final   01/17/2017 97 95 - 110 mmol/L Final     ANION GAP   Date Value Ref Range Status   02/10/2017 11.0 5.0 - 15.0 mmol/L Final   01/17/2017 13.0 5.0 - 15.0 mmol/L Final     CREATININE   Date Value Ref Range Status   02/10/2017 0.80 0.50 - 1.00 mg/dL Final   01/17/2017 1.0 0.5 - 1.0 mg/dl Final     BUN   Date Value Ref Range Status   02/10/2017 13 7 - 21 mg/dL Final   01/17/2017 15 7 - 21 mg/dl Final     BUN/CREATININE RATIO   Date Value Ref Range Status   02/10/2017 16.3 7.0 - 25.0 Final     CALCIUM   Date Value Ref Range Status   02/10/2017 9.3 8.4 - 10.2 mg/dL Final   01/17/2017 9.4 8.4 - 10.2 mg/dl Final     EGFR NON  AMER   Date Value Ref Range Status   02/10/2017 70 >60 mL/min/1.73 Final     ALKALINE PHOSPHATASE   Date Value Ref Range Status   02/10/2017 120 38 - 126 U/L Final   01/17/2017 130 (H) 38 - 126 U/L Final     TOTAL PROTEIN   Date Value Ref Range Status   02/10/2017 7.0 6.3 - 8.6 g/dL Final   01/17/2017 6.8 6.3 - 8.6 gm/dl Final     ALT (SGPT)   Date Value Ref Range Status   02/10/2017 25 9 - 52 U/L Final   01/17/2017 37 9 - 52 U/L Final     AST (SGOT)   Date Value Ref Range Status   02/10/2017 25 14 - 36 U/L Final   01/17/2017 24 14 - 36 U/L Final     TOTAL BILIRUBIN   Date Value Ref Range Status   02/10/2017 0.6 0.2 - 1.3 mg/dL Final   01/17/2017 0.6 0.2 - 1.3 mg/dl Final     ALBUMIN   Date Value Ref Range Status   02/10/2017 4.10 3.40 - 4.80 g/dL Final   01/17/2017 4.1 3.4 - 4.8 gm/dl Final     GLOBULIN   Date Value Ref Range Status   02/10/2017  2.9 2.3 - 3.5 gm/dL Final     A/G RATIO   Date Value Ref Range Status   02/10/2017 1.4 1.1 - 1.8 g/dL Final     Lab Results   Component Value Date    WBC 10.24 (H) 02/10/2017    HGB 12.0 02/10/2017    HCT 35.7 02/10/2017    MCV 83.6 02/10/2017     02/10/2017     Lab Results   Component Value Date    NEUTROABS 7.35 02/10/2017    IRON 42 01/17/2017    TIBC 359 01/17/2017    LABIRON 11.7 (L) 01/17/2017    FERRITIN 43.1 01/17/2017    HVIRGRZF32 523 10/24/2016    FOLATE 13.70 10/24/2016     TSH done on January 17, 2017 was elevated at 31.4.  Free T4 done on January 17, 2017 was low at 0.74.  Both of them were done before starting Opdivo.      RADIOLOGY DATA :    CAT scan of chest abdomen and pelvis with intravenous contrast done on January 12, 2017 was reviewed and discussed with the patient it showed:    IMPRESSION-   1.Postsurgical changes consistent with prior right hemicolectomy.  2.Multiple anterior abdominal wall scar rim-enhancing lesion  suspicious for metastatic involvement in the region of the abdominal  scar.  3.Multiple ring-enhancing fluid necrotic metastatic foci are seen in  the right hemicolectomy site, adjacent to the distal small bowel, and  in the abdominal and pelvis central fatty mesentery, along the  periphery splenic capsule consistent with worsening metastatic  involvement.  4.Remainder CT chest abdomen pelvis study unremarkable.      ASSESSMENT AND PLAN:       1.  Recurrent metastatic colon cancer.  Patient initially was diagnosed with a stage III disease T4 N2 M0 in February 2016.  Patient initially had a hemicolectomy followed by adjuvant FOLFOX 6 for 8 cycles last of which was in August 2016.  Chemotherapy was held at that point due to worsening performance status side effects as well as recurrent hospital admission.  In October 2016 she had a recurrence at the site of anastomosis for which Dr. Quesada did total colectomy.  After that patient was seen at [facility] for a second opinion,  due to PET scan being negative for any metastatic disease except for the site of recurrence the recommended close monitoring for recurrence.  In December 2016 around [place] she started feeling a lump around anterior abdominal wall next to the surgical incision for which Dr. Quesada did a CAT scan of chest abdomen and pelvis on January 12, 2017, it showed there was a recurrence and metastatic disease involving anterior abdominal wall mesentery as well as spleen and pelvic sidewall.  For which patient was again seen by Dr. Peck at [facility] and recommendation was to start her on Opdivo due to tumor having MSI high status.  We will go ahead with the second cycle of Opdivo today.  The anterior abdominal wall lump seems to be getting little bigger.  At this point we'll continue with 2 more cycles of Opdivo after this one and then do the restaging CT scans.    2.  Hypothyroidism: Patient's levothyroxine was recently decrease to 75 µg by medical doctor.  Her TSH and free T4 done before starting Opdivo on January 17 are significantly abnormal and she is complaining of significant fatigue.  We will increase her dose of levothyroxine 100 µg daily and referred her to Dr. Mota on urgent basis for her symptomatic hypothyroidism.  Referral has been made.  Patient was informed about all of this.    3.  Anemia due to iron deficiency due to colon cancer.  Status post Feraheme in the past.  Her hemoglobin is 12 to do so we will continue with monitoring CBC for now.    4.  Prescription: Patient is enough [place] 7.5/325.  New prescription for levothyroxine 100 µg daily has been sent to her pharmacy today until she gets seen by Dr. Mota.    5.  Health maintenance: Patient does not smoke.  Had a colonoscopy done in October 2016.  She remains full code.      Irving Booth MD  2/10/2017  6:04 PM

## 2017-02-13 PROBLEM — R11.2 NAUSEA WITH VOMITING: Status: ACTIVE | Noted: 2017-01-01

## 2017-02-13 NOTE — TELEPHONE ENCOUNTER
----- Message from Hali Burton sent at 2/13/2017  7:58 AM CST -----  Regarding: Vomiting  Contact: [phone]  Bernadette Albert left voicemail that pt was not feeling well and has been vomiting.  Please call her at number listed above.

## 2017-02-13 NOTE — PROGRESS NOTES
Bernadette Camarena is a 76 y.o. female who presents for  evaluation of   Chief Complaint   Patient presents with   • Thyroid Problem         Primary Care / Referring Provider  MD Dr. Leobardo Hansen    Hypothyroidism related to Hashimoto's     Duration, states for more than 10 years     Timing, constant    Quality , uncontrolled    Severity , moderate     Symptoms, Fatigue, weakness, depression, cold intolerance    Aggravating Factors,   Recurrent metastatic colon cancer now on Opdivo  She is having nausea and vomiting compromising absorption       In October 2016 she was on 150 mcgs daily and hyperthyroid  Dose was decreased to 75 and now TSH 31.  Dr. Booth has recently increased to 100 mcgs daily       Component      Latest Ref Rng 3/8/2016 10/26/2016 10/31/2016 1/27/2017   Free T4      0.78 - 2.19 ng/dL 1.27 2.55 (H) 1.72 0.76 (L)   TSH Baseline      0.460 - 4.680 mIU/mL 5.44 (H) <0.01 (L) 0.18 (L) 31.400 (H)   T3, Total      97 - 169 ng/dl  108 94 (L)                       Past Medical History   Diagnosis Date   • Acute sinusitis    • Anemia    • Anxiety    • Artificial lens present    • Cystitis    • Disease of thyroid gland    • Diverticular disease    • Diverticulitis of colon    • Dysphagia    • Dysuria    • GERD (gastroesophageal reflux disease)      With dysphagia   • Heart murmur    • History of malignant neoplasm of colon      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 6 of 24 lymph nodes positive   • Hordeolum    • Hyperlipidemia    • Hypertension    • Peripheral venous insufficiency    • Posterior subcapsular polar senile cataract      Left   • Stricture of esophagus    • URI (upper respiratory infection)    • UTI (urinary tract infection)    • Visual discomfort      Left     Family History   Problem Relation Age of Onset   • Diabetes Mother    • Heart disease Mother    • Hyperlipidemia Mother    • Vision loss Mother    • Arthritis Mother    • Diabetes Father    • Heart  disease Father    • Hyperlipidemia Father    • Vision loss Father    • Stroke Father    • Cancer Sister      Social History   Substance Use Topics   • Smoking status: Never Smoker   • Smokeless tobacco: Never Used   • Alcohol use No         Current Outpatient Prescriptions:   •  aspirin 81 MG EC tablet, Take 81 mg by mouth daily., Disp: , Rfl:   •  atorvastatin (LIPITOR) 20 MG tablet, Take 20 mg by mouth daily., Disp: , Rfl:   •  calcium carbonate-cholecalciferol 500-400 MG-UNIT tablet tablet, Take  by mouth daily., Disp: , Rfl:   •  diltiazem CD (CARDIZEM CD) 120 MG 24 hr capsule, Take 120 mg by mouth every 12 (twelve) hours., Disp: , Rfl:   •  escitalopram (LEXAPRO) 10 MG tablet, Take 10 mg by mouth daily., Disp: , Rfl:   •  hydrochlorothiazide (HYDRODIURIL) 25 MG tablet, Take 25 mg by mouth daily., Disp: , Rfl:   •  HYDROcodone-acetaminophen (NORCO) 7.5-325 MG per tablet, Take 1 tablet by mouth Every 6 (Six) Hours As Needed for moderate pain (4-6)., Disp: 90 tablet, Rfl: 0  •  levothyroxine (SYNTHROID, LEVOTHROID) 100 MCG tablet, Take 1 tablet by mouth Daily., Disp: 30 tablet, Rfl: 11  •  pantoprazole (PROTONIX) 40 MG EC tablet, Take 40 mg by mouth daily. Take every morning before breakfast, Disp: , Rfl:   •  ferrous sulfate 325 (65 FE) MG tablet, Take 325 mg by mouth 3 (three) times a day with meals., Disp: , Rfl:   •  HYDROcodone-acetaminophen (NORCO) 7.5-325 MG per tablet, , Disp: , Rfl:   •  labetalol (NORMODYNE) 100 MG tablet, Take 100 mg by mouth 2 (two) times a day., Disp: , Rfl:   •  levothyroxine (SYNTHROID, LEVOTHROID) 75 MCG tablet, , Disp: , Rfl:   •  metroNIDAZOLE (FLAGYL) 500 MG tablet, , Disp: , Rfl:   •  Multiple Vitamins-Minerals (CENTRUM SILVER ULTRA WOMENS PO), Take 1 tablet by mouth daily., Disp: , Rfl:   •  neomycin (MYCIFRADIN) 500 MG tablet, , Disp: , Rfl:   •  ondansetron (ZOFRAN) 4 MG tablet, Take 4 mg by mouth every 8 (eight) hours as needed for nausea or vomiting., Disp: , Rfl:   •   potassium chloride (K-DUR,KLOR-CON) 20 MEQ CR tablet, Take 20 mEq by mouth 2 (two) times a day., Disp: , Rfl:   No current facility-administered medications for this visit.     Review of Systems    Review of Systems   Constitutional: Positive for fatigue. Negative for activity change, appetite change, chills, diaphoresis, fever and unexpected weight change.   HENT: Negative for congestion, dental problem, drooling, ear discharge, ear pain, facial swelling, mouth sores, postnasal drip, rhinorrhea, sinus pressure, sore throat, tinnitus, trouble swallowing and voice change.    Eyes: Negative for photophobia, pain, discharge, redness, itching and visual disturbance.   Respiratory: Negative for apnea, cough, choking, chest tightness, shortness of breath, wheezing and stridor.    Cardiovascular: Negative for chest pain, palpitations and leg swelling.   Gastrointestinal: Negative for abdominal distention, abdominal pain, constipation, diarrhea, nausea and vomiting.   Endocrine: Positive for cold intolerance. Negative for heat intolerance, polydipsia, polyphagia and polyuria.   Genitourinary: Negative for decreased urine volume, difficulty urinating, dysuria, flank pain, frequency, hematuria and urgency.   Musculoskeletal: Positive for arthralgias and myalgias. Negative for back pain, gait problem, joint swelling, neck pain and neck stiffness.   Skin: Negative for color change, pallor, rash and wound.   Allergic/Immunologic: Negative for immunocompromised state.   Neurological: Positive for weakness. Negative for dizziness, tremors, seizures, syncope, facial asymmetry, speech difficulty, light-headedness, numbness and headaches.   Hematological: Negative for adenopathy.   Psychiatric/Behavioral: Positive for decreased concentration. Negative for agitation, behavioral problems, confusion, dysphoric mood, hallucinations, self-injury, sleep disturbance and suicidal ideas. The patient is not nervous/anxious and is not  "hyperactive.         Objective:     Visit Vitals   • /82 (BP Location: Right arm, Patient Position: Sitting, Cuff Size: Large Adult)   • Pulse 84   • Ht 65\" (165.1 cm)   • Wt 213 lb 11.2 oz (96.9 kg)   • BMI 35.56 kg/m2       Physical Exam   Constitutional: She is oriented to person, place, and time. She appears well-developed.   HENT:   Head: Normocephalic.   Right Ear: External ear normal.   Left Ear: External ear normal.   Nose: Nose normal.   Eyes: Conjunctivae and EOM are normal. No scleral icterus.   Neck: Normal range of motion. Neck supple. No tracheal deviation present. No thyromegaly present.   Cardiovascular: Normal rate, regular rhythm, normal heart sounds and intact distal pulses.  Exam reveals no gallop and no friction rub.    No murmur heard.  Pulmonary/Chest: Effort normal and breath sounds normal. No stridor. No respiratory distress. She has no wheezes. She has no rales. She exhibits no tenderness.   Abdominal: Soft. Bowel sounds are normal. She exhibits no distension and no mass. There is no tenderness. There is no rebound and no guarding.   Musculoskeletal: Normal range of motion. She exhibits no tenderness or deformity.   Lymphadenopathy:     She has no cervical adenopathy.   Neurological: She is alert and oriented to person, place, and time. She displays normal reflexes. She exhibits normal muscle tone. Coordination normal.   Skin: No rash noted. No erythema. No pallor.   Psychiatric: She has a normal mood and affect. Her behavior is normal. Judgment and thought content normal.       Lab Review    Results for orders placed or performed in visit on 02/10/17   Comprehensive metabolic panel   Result Value Ref Range    Glucose 133 (H) 60 - 100 mg/dL    BUN 13 7 - 21 mg/dL    Creatinine 0.80 0.50 - 1.00 mg/dL    Sodium 136 (L) 137 - 145 mmol/L    Potassium 3.6 3.5 - 5.1 mmol/L    Chloride 94 (L) 95 - 110 mmol/L    CO2 31.0 22.0 - 31.0 mmol/L    Calcium 9.3 8.4 - 10.2 mg/dL    Total Protein 7.0 " 6.3 - 8.6 g/dL    Albumin 4.10 3.40 - 4.80 g/dL    ALT (SGPT) 25 9 - 52 U/L    AST (SGOT) 25 14 - 36 U/L    Alkaline Phosphatase 120 38 - 126 U/L    Total Bilirubin 0.6 0.2 - 1.3 mg/dL    eGFR Non African Amer 70 >60 mL/min/1.73    Globulin 2.9 2.3 - 3.5 gm/dL    A/G Ratio 1.4 1.1 - 1.8 g/dL    BUN/Creatinine Ratio 16.3 7.0 - 25.0    Anion Gap 11.0 5.0 - 15.0 mmol/L   CBC Auto Differential   Result Value Ref Range    WBC 10.24 (H) 3.20 - 9.80 10*3/mm3    RBC 4.27 3.77 - 5.16 10*6/mm3    Hemoglobin 12.0 12.0 - 15.5 g/dL    Hematocrit 35.7 35.0 - 45.0 %    MCV 83.6 80.0 - 98.0 fL    MCH 28.1 26.5 - 34.0 pg    MCHC 33.6 31.4 - 36.0 g/dL    RDW 15.1 (H) 11.5 - 14.5 %    RDW-SD 45.4 36.4 - 46.3 fl    MPV 9.5 8.0 - 12.0 fL    Platelets 210 150 - 450 10*3/mm3    Neutrophil % 71.7 37.0 - 80.0 %    Lymphocyte % 14.3 10.0 - 50.0 %    Monocyte % 8.7 0.0 - 12.0 %    Eosinophil % 4.4 0.0 - 7.0 %    Basophil % 0.5 0.0 - 2.0 %    Immature Grans % 0.4 0.0 - 0.5 %    Neutrophils, Absolute 7.35 2.00 - 8.60 10*3/mm3    Lymphocytes, Absolute 1.46 0.60 - 4.20 10*3/mm3    Monocytes, Absolute 0.89 0.00 - 0.90 10*3/mm3    Eosinophils, Absolute 0.45 0.00 - 0.70 10*3/mm3    Basophils, Absolute 0.05 0.00 - 0.20 10*3/mm3    Immature Grans, Absolute 0.04 (H) 0.00 - 0.02 10*3/mm3           Assessment/Plan       ICD-10-CM ICD-9-CM   1. Hypothyroidism due to Hashimoto's thyroiditis E03.8 244.8    E06.3 245.2     Hypothyroidism secondary to Hashimoto's   Now on Opdivo for recurrent metastatic colon ca    It is possible that opdivo is partly responsible for hypothyroidism as well as nausea and occasionally vomiting    We know that 150 mcgs daily resulted in hyperthyroidism and that 75 mcgs daily in hypothyroidism    I agree with dose increase. Dr. Booth increased to 100 mcgs daily .  She has plenty of 75 mcgs left. I asked her to in addition to 100 mcgs qd she takes 1/2 tab of 75 two times per week = 110 mcgs daily     Monthly labs , appt q 4 months            I reviewed and summarized records from Kyler Kim MD and Dr. Booth from 2017 and I reviewed / ordered labs.     Orders Placed This Encounter   Procedures   • TSH     Standing Status:   Standing     Number of Occurrences:   12     Standing Expiration Date:   1/13/2018   • T4, Free     Standing Status:   Standing     Number of Occurrences:   12     Standing Expiration Date:   1/13/2018         A copy of my note was sent to Kyler Kim MD    Please see my above opinion and suggestions.

## 2017-02-20 NOTE — TELEPHONE ENCOUNTER
----- Message from Hali Burton sent at 2/20/2017  8:04 AM CST -----  Regarding: weak  Contact: [phone]  Ram[facility] Albert has called stating that pt is not eating and is even more weak than last week.  What do they need to do?

## 2017-02-20 NOTE — TELEPHONE ENCOUNTER
Called and spoke with pt's daughter, pt has not been eating or drinking and is very weak and cannot get out of bed.  Will speak with dr. Booth and return call to pt and family.

## 2017-02-20 NOTE — PROGRESS NOTES
LCSW notified by RN of pt responses that indicate receptiveness to SW support in regard to emotions and assessment for depression and anxiety. LCSW met with patient in private room in infusion area.  Patient is accompanied by support persons identified as her significant other and daughter, Fabiola Albert. Ms. Albert served as supportive historian related to patient concerns.   Patient is seen at [facility] for recurrent metastatic colon cancer and has recently started on new medication, Opdivo.    Sw introduced self to pt/ family and explained role and support. Pt initial responses were evasive and guarded with family providing encouragement for pt to share her thoughts and feelings. Per patient and family, pt is experiencing symptoms of depression as they describe her decompensation physically and emotionally over the past week. Pt. Shared experiences related to fatigue and nausea and difficulty completing tasks of daily functioning. She self reports a 10 pound weight loss in one week and attributes to no appetite and nausea.  Pt. Is described as isolating and staying in bed with low motivation, Initially, pt describes significant symptoms consistent with depression that are mediated by her illness, and associated treatment.  Pt. Describes feelings of fatigue that are associated with her condition and SW offered education and CBT interventions that will assist pt in conserving energy and utilizing time for pleasurable/quality moments with family.  Medication list reviewed with pt recorded as taking Lexapro 10 mg., but pt states she does not have this medication. SW discussed use of anti depressant to include benefits, compliance and efficacy.  SW was present in the room as pt was seen by Dr. Booth who examined pt and offered change in medications that are related to fatigue, pain, nausea and appetite. It was determined to wait on any changes with antidepressants, but family encouraged to implement small changes in  support and reinforcement.    Subsequently, SW met with her daughter and further offered  related to talking with 7 year old grand daughter of pt about cancer and noted decline. SW provide age appropriate art therapy material for child and offered continued supportive .        Patient presents this day with blunted affect, tearful and depressed mood.   Pt. Presents to be experiencing significant symptoms of depression that are mediated by her physical symptoms, treatment and emotional considerations of life altering illness.    Pt. Denies thoughts of self inflicted harm, she strives to remain positive and independent in her functioning and asking for help is described as difficulty.   Pt. Symptoms of depression warrant further monitoring as physical symptoms addressed and behavioral modifications initiated. SW to follow up on her return and will reassess potential of resuming anti depressant. SW provided information as to home care services such as [facility], combining apt at [facility] (rad/med onc) and decreasing activities that utilize her energy resources.      LCSW offered emotional support utilizing person centered therapeutic approach.  Strengths and coping skills were processed. Patient was encouraged to share her thoughts and feelings related to diagnosis, treatment plans and psychosocial stressors. Feelings were validated and education provided in regard to the normalization of emotions experienced.  LCSW offered encouragement and ongoing support of the multi disciplinary team.   Patient and family  responded receptive to SW feedback. .

## 2017-02-20 NOTE — PROGRESS NOTES
DATE OF VISIT: 2/20/2017    REASON FOR VISIT: Generalized weakness, nausea and vomiting.    HISTORY OF PRESENT ILLNESS:    76-year-old female with a past medical history significant for stage III colon cancer, T4 N2 M0 mucinous adenocarcinoma initially was diagnosed in February 2016.  At that point she had a hemicolectomy followed by left modified FOLFOX-64 total 8 cycles last of which was in August 2016.  Due to side effects from the chemotherapy and recurrent diverticulitis and worsening performance status patient was taken off chemotherapy at that point by Dr. Rodriguez.  Patient again was seen in clinic in October 2016 at that point in view of abdominal pain and developing iron deficiency anemia patient had a restaging CAT scan done which showed there was a recurrence at the site of anastomosis.  Subsequently patient had a total colectomy and patient was seen at [place] after that for a second opinion.  In view of PET scan being negative right parietal colectomy and no evidence of disease recommendation was to close observation for recurrence.  In around Christmas of 2016 patient started feeling the lump on her anterior abdominal wall at the site of surgical incision and again was seen by Dr. Quesada had a restaging workup done.  Unfortunately it showed there was a recurrence with a metastasis involving the pelvic sidewall as well as anterior abdominal wall.  Patient's tumors Foundation 1 testing was done at [place], it showed patient's tumor is MSI high and Dr. Peck who is the GI oncologist at [place] recommended to start patient on Opdivo because of the aggressive nature and MSI being high.  Patient received her second cycle of Opdivo on February 10, 2017.  Over the last few days she has been feeling generalized weak.  Patient also complains of feeling nauseous and had an episode of vomiting this morning.  As per family patient stays in the bed all time without having any energy.  He has lost about 9  pounds in one week    PAST MEDICAL HISTORY:    Past Medical History   Diagnosis Date   • Acute sinusitis    • Anemia    • Anxiety    • Artificial lens present    • Cystitis    • Disease of thyroid gland    • Diverticular disease    • Diverticulitis of colon    • Dysphagia    • Dysuria    • GERD (gastroesophageal reflux disease)      With dysphagia   • Heart murmur    • History of malignant neoplasm of colon      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 6 of 24 lymph nodes positive   • Hordeolum    • Hyperlipidemia    • Hypertension    • Peripheral venous insufficiency    • Posterior subcapsular polar senile cataract      Left   • Stricture of esophagus    • URI (upper respiratory infection)    • UTI (urinary tract infection)    • Visual discomfort      Left       SOCIAL HISTORY:    Social History   Substance Use Topics   • Smoking status: Never Smoker   • Smokeless tobacco: Never Used   • Alcohol use No       Surgical History :  Past Surgical History   Procedure Laterality Date   •  section     • Hysterectomy     • Carpal tunnel release  2014     Left carpal tunnel release   • Cholecystectomy  2016     Laparoscopic converted to open hemicolectomy. Right colon cancer   • Endoscopy and colonoscopy  2015     Internal & external hemorrhoids found. Diverticulum in sigmoid colon & descending colon   • Colonoscopy  02/10/2016     No specimens collected. Mild diverticulosis in the sigmoid colon.Malignant appearing tumor in the colon. Biopsied   • Esophagoscopy / egd  02/10/2016     With biopsy-Benign appearing esophageal stenosis.Chronic gastritis.Biopsied.Normal examined duodenum   • Esophagoscopy / egd  2015     with tube-Esophageal stricture present. Dilatation performed. Gastritis in stomach. Biopsy taken. Normal duodenum   • Venous access device (port) insertion  03/10/2016     Ultrasound guided right Mediport placement under fluoroscopy   • Cataract extraction   "10/15/2015     Cataract extraction with intraocular lens implantation, left eye       ALLERGIES:    Allergies   Allergen Reactions   • Latex        REVIEW OF SYSTEMS:      CONSTITUTIONAL: Positive for fatigue.  Positive for generalized weakness.  No fever, chills, or night sweats.     HEENT:  No epistaxis, mouth sores, or difficulty swallowing.    RESPIRATORY:  Denies any shortness of breath or cough at present.    CARDIOVASCULAR:  No chest pain or palpitations.    GASTROINTESTINAL: Complains of abdominal pain at the site of lump in anterior abdominal wall.  Complains of nausea since starting Norco.  Positive for episode of vomiting this morning.  Positive for poor appetite.  Denies any blood in the stool or diarrhea.    GENITOURINARY:  No dysuria or hematuria.    MUSCULOSKELETAL:  Complains of chronic lower back pain.    NEUROLOGICAL:  Positive for chronic tingling and numbness affecting upper extremity and lower extremity since chemotherapy. No new headache or dizziness.     LYMPHATICS:  Denies any abnormal swollen and anywhere in the body.    SKIN:  Positive for lumps and nodules on the anterior abdominal wall around surgical site.    PHYSICAL EXAMINATION:      VITAL SIGNS:    Visit Vitals   • /65   • Pulse 84   • Temp 97 °F (36.1 °C)   • Resp 20   • Ht 65\" (165.1 cm)   • Wt 204 lb 1.6 oz (92.6 kg)   • BMI 33.96 kg/m2       GENERAL:  Not in any distress.     EYES:  Mild pallor. No icterus.    NECK:  No adenopathy in cervical or supraclavicular.    RESPIRATORY:  Fair air entry bilaterally. No rhonchi or wheezing.    CARDIOVASCULAR:  S1, S2. Regular rate and rhythm.    ABDOMEN:  Soft, obese, there is 4 x 3 cm lump that can be palpable to the anterior abdominal wall next to the midline surgical incision.  There is a new lump about 2 x 2 centimeter palpable in the right lower quadrant.  Bowel sounds present.    EXTREMITIES:  No edema.  No calf tenderness.    NEUROLOGICAL:  Alert, awake, oriented x3. No other or " sensory deficits.  Cranial nerves 2-12 grossly intact.    DIAGNOSTIC DATA:    GLUCOSE   Date Value Ref Range Status   02/20/2017 118 (H) 60 - 100 mg/dL Final   01/17/2017 102 (H) 60 - 100 mg/dl Final     SODIUM   Date Value Ref Range Status   02/20/2017 133 (L) 137 - 145 mmol/L Final   01/17/2017 137 137 - 145 mmol/L Final     POTASSIUM   Date Value Ref Range Status   02/20/2017 3.1 (L) 3.5 - 5.1 mmol/L Final   01/17/2017 3.5 3.5 - 5.1 mmol/L Final     CO2   Date Value Ref Range Status   02/20/2017 30.0 22.0 - 31.0 mmol/L Final   01/17/2017 27 22 - 31 mmol/L Final     CHLORIDE   Date Value Ref Range Status   02/20/2017 91 (L) 95 - 110 mmol/L Final   01/17/2017 97 95 - 110 mmol/L Final     ANION GAP   Date Value Ref Range Status   02/20/2017 12.0 5.0 - 15.0 mmol/L Final   01/17/2017 13.0 5.0 - 15.0 mmol/L Final     CREATININE   Date Value Ref Range Status   02/20/2017 0.69 0.50 - 1.00 mg/dL Final   01/17/2017 1.0 0.5 - 1.0 mg/dl Final     BUN   Date Value Ref Range Status   02/20/2017 19 7 - 21 mg/dL Final   01/17/2017 15 7 - 21 mg/dl Final     BUN/CREATININE RATIO   Date Value Ref Range Status   02/20/2017 27.5 (H) 7.0 - 25.0 Final     CALCIUM   Date Value Ref Range Status   02/20/2017 9.3 8.4 - 10.2 mg/dL Final   01/17/2017 9.4 8.4 - 10.2 mg/dl Final     EGFR NON  AMER   Date Value Ref Range Status   02/20/2017 83 >60 mL/min/1.73 Final     ALKALINE PHOSPHATASE   Date Value Ref Range Status   02/20/2017 101 38 - 126 U/L Final   01/17/2017 130 (H) 38 - 126 U/L Final     TOTAL PROTEIN   Date Value Ref Range Status   02/20/2017 6.6 6.3 - 8.6 g/dL Final   01/17/2017 6.8 6.3 - 8.6 gm/dl Final     ALT (SGPT)   Date Value Ref Range Status   02/20/2017 22 9 - 52 U/L Final   01/17/2017 37 9 - 52 U/L Final     AST (SGOT)   Date Value Ref Range Status   02/20/2017 25 14 - 36 U/L Final   01/17/2017 24 14 - 36 U/L Final     TOTAL BILIRUBIN   Date Value Ref Range Status   02/20/2017 0.8 0.2 - 1.3 mg/dL Final   01/17/2017  0.6 0.2 - 1.3 mg/dl Final     ALBUMIN   Date Value Ref Range Status   02/20/2017 3.90 3.40 - 4.80 g/dL Final   01/17/2017 4.1 3.4 - 4.8 gm/dl Final     GLOBULIN   Date Value Ref Range Status   02/20/2017 2.7 2.3 - 3.5 gm/dL Final     A/G RATIO   Date Value Ref Range Status   02/20/2017 1.4 1.1 - 1.8 g/dL Final     Lab Results   Component Value Date    WBC 12.14 (H) 02/20/2017    HGB 11.9 (L) 02/20/2017    HCT 35.2 02/20/2017    MCV 82.4 02/20/2017     02/20/2017     Lab Results   Component Value Date    NEUTROABS 8.91 (H) 02/20/2017    IRON 42 01/17/2017    TIBC 359 01/17/2017    LABIRON 11.7 (L) 01/17/2017    FERRITIN 43.1 01/17/2017    KOATJCWJ35 523 10/24/2016    FOLATE 13.70 10/24/2016     TSH done on January 17, 2017 was elevated at 31.4.  Free T4 done on January 17, 2017 was low at 0.74.  Both of them were done before starting Opdivo.      RADIOLOGY DATA :    CAT scan of chest abdomen and pelvis with intravenous contrast done on January 12, 2017 was reviewed and discussed with the patient it showed:    IMPRESSION-   1.Postsurgical changes consistent with prior right hemicolectomy.  2.Multiple anterior abdominal wall scar rim-enhancing lesion  suspicious for metastatic involvement in the region of the abdominal  scar.  3.Multiple ring-enhancing fluid necrotic metastatic foci are seen in  the right hemicolectomy site, adjacent to the distal small bowel, and  in the abdominal and pelvis central fatty mesentery, along the  periphery splenic capsule consistent with worsening metastatic  involvement.  4.Remainder CT chest abdomen pelvis study unremarkable.      ASSESSMENT AND PLAN:      1.  Generalized weakness with nausea and vomiting: At this point we will start with IV hydration we'll give patient a little of normal saline along with the Decadron and Zofran.  Patient is complaining of poor appetite so we will start patient on Megace 400 mg by mouth daily.  We will also send a prescription for Zofran to her  pharmacy.  Patient was encouraged to take her nausea medication every 6 hours for next 2 days.  Her hemoglobin is still 11.7 so no need to do any blood transfusion today.  Patient feels excessive nauseous after taking her Lortab.  At this point we'll change her medication to oxycodone 5 mg every 8 hours and was given new prescription for oxycodone 40 tablets today.     2.  Recurrent metastatic colon cancer.  Patient initially was diagnosed with a stage III disease T4 N2 M0 in February 2016.  Patient initially had a hemicolectomy followed by adjuvant FOLFOX 6 for 8 cycles last of which was in August 2016.  Chemotherapy was held at that point due to worsening performance status side effects as well as recurrent hospital admission.  In October 2016 she had a recurrence at the site of anastomosis for which Dr. Quesada did total colectomy.  After that patient was seen at [place] for a second opinion, due to PET scan being negative for any metastatic disease except for the site of recurrence the recommended close monitoring for recurrence.  In December 2016 around [place] she started feeling a lump around anterior abdominal wall next to the surgical incision for which Dr. Quesada did a CAT scan of chest abdomen and pelvis on January 12, 2017, it showed there was a recurrence and metastatic disease involving anterior abdominal wall mesentery as well as spleen and pelvic sidewall.  For which patient was again seen by Dr. Peck at [place] and recommendation was to start her on Opdivo due to tumor having MSI high status.  Status post 2 cycles of Opdivo last of which was on February 10, 2017.  The anterior abdominal wall lump seems to be getting little bigger.  At this point we'll continue with 2 more cycles of Opdivo after this one and then do the restaging CT scans.    3.  Hypothyroidism: Patient was recently seen by Dr. Mota and he recommended increasing dose 110 µg which patient is taking at this point.   Hypothyroidism could be contributing to her feeling fatigue and generalized weakness as well.    4.  Anemia due to iron deficiency due to colon cancer.  Status post Feraheme in the past.  Her hemoglobin is 11.7 to do so we will continue with monitoring CBC for now.    5.  Prescription: Prescription for Zofran, Megace and oxycodone 5 mg has been given today.    6.  Health maintenance: Patient does not smoke.  Had a colonoscopy done in October 2016.  She remains full code.      Irving Booth MD  2/20/2017  5:05 PM

## 2017-02-22 NOTE — PROGRESS NOTES
Adult Outpatient Nutrition  Assessment    Patient Name:  Bernadette Camarena  YOB: 1940  MRN: [id number]        Assessment Date:  2/22/2017                        Comments:  LSW request that RD call pt/family due to declining nutrition--  10 lb wt loss/nausea/decreased appetite. Per phone, family stated  nausea less/appetite & intake worse. Tolerates liquids better than solids.  Takes Boost and Sprite. Urged small frequent feedings. Mailed Boost coupons  & recipes for other liquid supplements. Pt may be candidate for   appetite stimu.          Electronically signed by:  Sarah Carbone RD  02/22/17 2:28 PM

## 2017-02-24 NOTE — PROGRESS NOTES
DATE OF VISIT: 2017    REASON FOR VISIT: Metastatic colon adenocarcinoma starting Opdivo.    HISTORY OF PRESENT ILLNESS:    76-year-old female with a past medical history significant for recurrent colon cancer with a pelvic sidewall as well as the anterior abdominal wall metastasis currently getting treatment with Opdivo.  She is here for her third cycle of Opdivo today.  Still complains of significant fatigue.  Complains of poor appetite.  She has lost 2 more pounds since last clinic visit about 3 days ago.  States her nausea is improved.  Denies any fever or chills.    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 6 of 24 lymph nodes positive   • Hordeolum    • Hyperlipidemia    • Hypertension    • Peripheral venous insufficiency    • Posterior subcapsular polar senile cataract      Left   • Stricture of esophagus    • URI (upper respiratory infection)    • UTI (urinary tract infection)    • Visual discomfort      Left       SOCIAL HISTORY:    Social History   Substance Use Topics   • Smoking status: Never Smoker   • Smokeless tobacco: Never Used   • Alcohol use No       Surgical History :  Past Surgical History   Procedure Laterality Date   •  section     • Hysterectomy     • Carpal tunnel release  2014     Left carpal tunnel release   • Cholecystectomy  2016     Laparoscopic converted to open hemicolectomy. Right colon cancer   • Endoscopy and colonoscopy  2015     Internal & external hemorrhoids found. Diverticulum in sigmoid colon & descending colon   • Colonoscopy  02/10/2016      No specimens collected. Mild diverticulosis in the sigmoid colon.Malignant appearing tumor in the colon. Biopsied   • Esophagoscopy / egd  02/10/2016     With biopsy-Benign appearing esophageal stenosis.Chronic gastritis.Biopsied.Normal examined duodenum   • Esophagoscopy / egd  02/12/2015     with tube-Esophageal stricture present. Dilatation performed. Gastritis in stomach. Biopsy taken. Normal duodenum   • Venous access device (port) insertion  03/10/2016     Ultrasound guided right Mediport placement under fluoroscopy   • Cataract extraction  10/15/2015     Cataract extraction with intraocular lens implantation, left eye       ALLERGIES:    Allergies   Allergen Reactions   • Latex        REVIEW OF SYSTEMS:      CONSTITUTIONAL: Positive for fatigue.  Positive for 2 more pound of weight loss since clinic visit 3 days ago.  No fever, chills, or night sweats.     HEENT:  No epistaxis, mouth sores, or difficulty swallowing.    RESPIRATORY:  Denies any shortness of breath or cough at present.    CARDIOVASCULAR:  No chest pain or palpitations.    GASTROINTESTINAL: Complains of abdominal pain at the site of lump in anterior abdominal wall.  States nausea is improved after starting Zofran and changing her pain medication to oxycodone.  Denies any blood in the stool or diarrhea.    GENITOURINARY:  No dysuria or hematuria.    MUSCULOSKELETAL:  Complains of chronic lower back pain.    NEUROLOGICAL:  Positive for chronic tingling and numbness affecting upper extremity and lower extremity since chemotherapy FOLFOX. No new headache or dizziness.     LYMPHATICS:  Denies any abnormal swollen and anywhere in the body.    SKIN:  Positive for lumps and nodules on the anterior abdominal wall around surgical site.    PHYSICAL EXAMINATION:      VITAL SIGNS:    Visit Vitals   • /63   • Pulse 87   • Temp 96.6 °F (35.9 °C)   • Resp 18   • Wt 202 lb 12.8 oz (92 kg)   • BMI 33.75 kg/m2       GENERAL:  Not in any distress.      EYES:  Mild pallor. No icterus.    NECK:  No adenopathy in cervical or supraclavicular.    RESPIRATORY:  Fair air entry bilaterally. No rhonchi or wheezing.    CARDIOVASCULAR:  S1, S2. Regular rate and rhythm.    ABDOMEN:  Soft, obese, there is 5 x 4 cm lump that can be palpable to the anterior abdominal wall next to the midline surgical incision.  There is a new lump about 2 x 2 centimeter palpable in the right lower quadrant.  Bowel sounds present.    EXTREMITIES:  No edema.  No calf tenderness.    NEUROLOGICAL:  Alert, awake, oriented x3. No other or sensory deficits.  Cranial nerves 2-12 grossly intact.    DIAGNOSTIC DATA:    GLUCOSE   Date Value Ref Range Status   02/24/2017 134 (H) 60 - 100 mg/dL Final   01/17/2017 102 (H) 60 - 100 mg/dl Final     SODIUM   Date Value Ref Range Status   02/24/2017 134 (L) 137 - 145 mmol/L Final   01/17/2017 137 137 - 145 mmol/L Final     POTASSIUM   Date Value Ref Range Status   02/24/2017 3.4 (L) 3.5 - 5.1 mmol/L Final   01/17/2017 3.5 3.5 - 5.1 mmol/L Final     CO2   Date Value Ref Range Status   02/24/2017 28.0 22.0 - 31.0 mmol/L Final   01/17/2017 27 22 - 31 mmol/L Final     CHLORIDE   Date Value Ref Range Status   02/24/2017 93 (L) 95 - 110 mmol/L Final   01/17/2017 97 95 - 110 mmol/L Final     ANION GAP   Date Value Ref Range Status   02/24/2017 13.0 5.0 - 15.0 mmol/L Final   01/17/2017 13.0 5.0 - 15.0 mmol/L Final     CREATININE   Date Value Ref Range Status   02/24/2017 0.84 0.50 - 1.00 mg/dL Final   01/17/2017 1.0 0.5 - 1.0 mg/dl Final     BUN   Date Value Ref Range Status   02/24/2017 24 (H) 7 - 21 mg/dL Final   01/17/2017 15 7 - 21 mg/dl Final     BUN/CREATININE RATIO   Date Value Ref Range Status   02/24/2017 28.6 (H) 7.0 - 25.0 Final     CALCIUM   Date Value Ref Range Status   02/24/2017 9.3 8.4 - 10.2 mg/dL Final   01/17/2017 9.4 8.4 - 10.2 mg/dl Final     EGFR NON  AMER   Date Value Ref Range Status   02/24/2017 66 39 - 90 mL/min/1.73 Final      ALKALINE PHOSPHATASE   Date Value Ref Range Status   02/24/2017 97 38 - 126 U/L Final   01/17/2017 130 (H) 38 - 126 U/L Final     TOTAL PROTEIN   Date Value Ref Range Status   02/24/2017 6.5 6.3 - 8.6 g/dL Final   01/17/2017 6.8 6.3 - 8.6 gm/dl Final     ALT (SGPT)   Date Value Ref Range Status   02/24/2017 27 9 - 52 U/L Final   01/17/2017 37 9 - 52 U/L Final     AST (SGOT)   Date Value Ref Range Status   02/24/2017 31 14 - 36 U/L Final   01/17/2017 24 14 - 36 U/L Final     TOTAL BILIRUBIN   Date Value Ref Range Status   02/24/2017 0.7 0.2 - 1.3 mg/dL Final   01/17/2017 0.6 0.2 - 1.3 mg/dl Final     ALBUMIN   Date Value Ref Range Status   02/24/2017 4.00 3.40 - 4.80 g/dL Final   01/17/2017 4.1 3.4 - 4.8 gm/dl Final     GLOBULIN   Date Value Ref Range Status   02/24/2017 2.5 2.3 - 3.5 gm/dL Final     A/G RATIO   Date Value Ref Range Status   02/24/2017 1.6 1.1 - 1.8 g/dL Final     Lab Results   Component Value Date    WBC 15.68 (H) 02/24/2017    HGB 12.1 02/24/2017    HCT 35.8 02/24/2017    MCV 82.5 02/24/2017     02/24/2017     Lab Results   Component Value Date    NEUTROABS 12.35 (H) 02/24/2017    IRON 42 01/17/2017    TIBC 359 01/17/2017    LABIRON 11.7 (L) 01/17/2017    FERRITIN 43.1 01/17/2017    PDBTVRPU30 523 10/24/2016    FOLATE 13.70 10/24/2016     TSH done on January 17, 2017 was elevated at 31.4.  Free T4 done on January 17, 2017 was low at 0.74.  Both of them were done before starting Opdivo.      RADIOLOGY DATA :    CAT scan of chest abdomen and pelvis with intravenous contrast done on January 12, 2017 was reviewed and discussed with the patient it showed:    IMPRESSION-   1.Postsurgical changes consistent with prior right hemicolectomy.  2.Multiple anterior abdominal wall scar rim-enhancing lesion  suspicious for metastatic involvement in the region of the abdominal  scar.  3.Multiple ring-enhancing fluid necrotic metastatic foci are seen in  the right hemicolectomy site, adjacent to the  distal small bowel, and  in the abdominal and pelvis central fatty mesentery, along the  periphery splenic capsule consistent with worsening metastatic  involvement.  4.Remainder CT chest abdomen pelvis study unremarkable.      ASSESSMENT AND PLAN:       1.  Recurrent metastatic colon cancer.  Patient initially was diagnosed with a stage III disease T4 N2 M0 in February 2016.  Patient initially had a hemicolectomy followed by adjuvant FOLFOX 6 for 8 cycles last of which was in August 2016.  Chemotherapy was held at that point due to worsening performance status side effects as well as recurrent hospital admission.  In October 2016 she had a recurrence at the site of anastomosis for which Dr. Quesada did total colectomy.  After that patient was seen at [facility] for a second opinion, due to PET scan being negative for any metastatic disease except for the site of recurrence the recommended close monitoring for recurrence.  In December 2016 around [place] she started feeling a lump around anterior abdominal wall next to the surgical incision for which Dr. Quesada did a CAT scan of chest abdomen and pelvis on January 12, 2017, it showed there was a recurrence and metastatic disease involving anterior abdominal wall mesentery as well as spleen and pelvic sidewall.  For which patient was again seen by Dr. Peck at [facility] and recommendation was to start her on Opdivo due to tumor having MSI high status.  Again it was discussed with the patient and her family members in the room, rule of chemotherapy at this point is palliation rather then cure.  If she is not feeling well on treatment with Opdivo we can always stop chemotherapy and to palliative care.  At this point she wants to continue with Opdivo.  We will go ahead with the third cycle of Opdivo today.  Plan is to give her 4 cycles of Opdivo and then repeat CT of chest abdomen and pelvis for restaging to see if there is any response.     2.  Hypothyroidism:  Patient is currently taking levothyroxine 110 µg as per Dr. Mota.  We'll follow up her TSH and free T4.    3.  Anemia due to iron deficiency due to colon cancer.  Status post Feraheme in the past.  Her hemoglobin is 12 to do so we will continue with monitoring CBC for now.    4.  Prescription: Patient has enough prescription for Megace, oxycodone and Zofran.    5.  Hypokalemia: We will give her Kdur 40 mEq by mouth today.    6.  Health maintenance: Patient does not smoke.  Had a colonoscopy done in October 2016.  She remains full code.      Irving Booth MD  2/24/2017  10:27 AM

## 2017-02-28 PROBLEM — K92.2 LOWER GI BLEED: Status: ACTIVE | Noted: 2017-01-01

## 2017-03-01 NOTE — CONSULTS
DATE OF CONSULT: 3/1/2017    REQUESTING SOURCE:  Dr. REN Delarosa      REASON FOR CONSULTATION: Metastatic colon cancer and GI bleeding      HISTORY OF PRESENT ILLNESS:    77-year-old female with a past medical history significant for recurrent colon cancer with pelvic sidewall as well as anterior abdominal wall metastasis was been on treatment with Opdivo recently.  Her third cycle of Opdivo was on 2017.  Patient is admitted to [facility] on 2017 with complaint of generalized weakness which has progressively got worse over the last few weeks.  Patient also complains of blood in the stool and dark stool which is ongoing for last 5 months but she has not ever mentioned it to anyone before.  Denies any fever or chills.  She states she is feeling more fatigued than usual and does not have any energy to do any activity.  She spends most of her day in bed or chair.    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 6 of 24 lymph nodes positive   • Hordeolum    • Hyperlipidemia    • Hypertension    • Peripheral venous insufficiency    • Posterior subcapsular polar senile cataract      Left   • Stricture of esophagus    • URI (upper respiratory infection)    • UTI (urinary tract infection)    • Visual discomfort      Left       PAST SURGICAL HISTORY:  Past Surgical History   Procedure Laterality Date   •  section     • Hysterectomy     • Carpal tunnel release  2014     Left carpal tunnel release   • Cholecystectomy  2016     Laparoscopic converted to open  hemicolectomy. Right colon cancer   • Endoscopy and colonoscopy  02/12/2015     Internal & external hemorrhoids found. Diverticulum in sigmoid colon & descending colon   • Colonoscopy  02/10/2016     No specimens collected. Mild diverticulosis in the sigmoid colon.Malignant appearing tumor in the colon. Biopsied   • Esophagoscopy / egd  02/10/2016     With biopsy-Benign appearing esophageal stenosis.Chronic gastritis.Biopsied.Normal examined duodenum   • Esophagoscopy / egd  02/12/2015     with tube-Esophageal stricture present. Dilatation performed. Gastritis in stomach. Biopsy taken. Normal duodenum   • Venous access device (port) insertion  03/10/2016     Ultrasound guided right Mediport placement under fluoroscopy   • Cataract extraction  10/15/2015     Cataract extraction with intraocular lens implantation, left eye       ALLERGIES:    Allergies   Allergen Reactions   • Latex    • Adhesive Tape Rash       SOCIAL HISTORY:   Social History   Substance Use Topics   • Smoking status: Never Smoker   • Smokeless tobacco: Never Used   • Alcohol use No       CURRENT MEDICATIONS:    Current Facility-Administered Medications   Medication Dose Route Frequency Provider Last Rate Last Dose   • aspirin EC tablet 81 mg  81 mg Oral Every Other Day Javier Peña MD   81 mg at 02/28/17 2153   • atorvastatin (LIPITOR) tablet 20 mg  20 mg Oral Q PM Javier Peña MD   20 mg at 03/01/17 1633   • diltiaZEM CD (CARDIZEM CD) 24 hr capsule 120 mg  120 mg Oral Q24H Javier Peña MD   120 mg at 03/01/17 0852   • escitalopram (LEXAPRO) tablet 10 mg  10 mg Oral Daily Javier Peña MD   10 mg at 03/01/17 0852   • hydrochlorothiazide (HYDRODIURIL) tablet 25 mg  25 mg Oral Daily Javier Peña MD   25 mg at 03/01/17 0853   • labetalol (NORMODYNE) tablet 100 mg  100 mg Oral BID Javier Peña MD   100 mg at 03/01/17 1711   • [START ON 3/2/2017] levothyroxine (SYNTHROID, LEVOTHROID) tablet 125 mcg  125 mcg Oral Q AM Kelly  JOSE Delarosa MD       • megestrol (MEGACE) 40 MG/ML suspension 400 mg  400 mg Oral Daily Javier Peña MD   400 mg at 03/01/17 0852   • Morphine sulfate (PF) injection 1 mg  1 mg Intravenous Q4H PRN Javier Peña MD   1 mg at 02/28/17 2252    And   • naloxone (NARCAN) injection 0.4 mg  0.4 mg Intravenous Q5 Min PRN Javier Peña MD       • ondansetron (ZOFRAN) tablet 4 mg  4 mg Oral 4x Daily PRN Javier Peña MD   4 mg at 03/01/17 0706   • oxyCODONE (ROXICODONE) immediate release tablet 5 mg  5 mg Oral Q8H PRN Javier Peña MD       • oxyCODONE-acetaminophen (PERCOCET) 7.5-325 MG per tablet 1 tablet  1 tablet Oral Q4H PRN Javier Peña MD   1 tablet at 03/01/17 1643   • pantoprazole (PROTONIX) EC tablet 40 mg  40 mg Oral Daily Javier Peña MD   40 mg at 03/01/17 0852   • potassium chloride (K-DUR,KLOR-CON) CR tablet 40 mEq  40 mEq Oral Once Kelly Delarosa MD       • sodium chloride 0.9 % flush 1-10 mL  1-10 mL Intravenous PRN Javier Peña MD       • sodium chloride 0.9 % flush 10 mL  10 mL Intravenous PRN Jeff Wilder MD       • sodium chloride 0.9 % infusion  100 mL/hr Intravenous Continuous Javier Peña  mL/hr at 03/01/17 1633 100 mL/hr at 03/01/17 1633        HOME MEDICATIONS:   No current facility-administered medications on file prior to encounter.      Current Outpatient Prescriptions on File Prior to Encounter   Medication Sig Dispense Refill   • aspirin 81 MG EC tablet Take 81 mg by mouth Every Other Day.     • atorvastatin (LIPITOR) 20 MG tablet Take 20 mg by mouth Every Evening.     • diltiazem CD (CARDIZEM CD) 120 MG 24 hr capsule Take 120 mg by mouth every 12 (twelve) hours.     • escitalopram (LEXAPRO) 10 MG tablet Take 10 mg by mouth daily.     • hydrochlorothiazide (HYDRODIURIL) 25 MG tablet Take 25 mg by mouth daily.     • labetalol (NORMODYNE) 100 MG tablet Take 100 mg by mouth 2 (two) times a day.     • levothyroxine (SYNTHROID, LEVOTHROID) 100 MCG tablet Take  1 tablet by mouth Daily. 30 tablet 11   • megestrol (MEGACE) 40 MG/ML suspension Take 10 mL by mouth Daily. 300 mL 1   • ondansetron (ZOFRAN) 4 MG tablet Take 1 tablet by mouth 4 (Four) Times a Day As Needed for nausea or vomiting. 40 tablet 3   • oxyCODONE (ROXICODONE) 5 MG immediate release tablet Take 1 tablet by mouth Every 8 (Eight) Hours As Needed for moderate pain (4-6) or severe pain (7-10). 40 tablet 0   • pantoprazole (PROTONIX) 40 MG EC tablet Take 40 mg by mouth daily. Take every morning before breakfast         FAMILY HISTORY:    Family History   Problem Relation Age of Onset   • Diabetes Mother    • Heart disease Mother    • Hyperlipidemia Mother    • Vision loss Mother    • Arthritis Mother    • Diabetes Father    • Heart disease Father    • Hyperlipidemia Father    • Vision loss Father    • Stroke Father    • Cancer Sister        REVIEW OF SYSTEMS:      CONSTITUTIONAL:  Complains of fatigue.  Complains generalized weakness.  Complains of weight loss and poor appetite.  Denies any fever, chills.    HEENT:  No epistaxis, mouth sores or difficulty swallowing.    RESPIRATORY:  No new shortness of breath. No new cough or hemoptysis.    CARDIOVASCULAR:  No chest pain or palpitations.    GASTROINTESTINAL: Complains of abdominal pain at the site of lump in anterior abdominal wall.  Complains of blood in the stool or dark stool for last 4-5 months.  Complains of intermittent nausea.     GENITOURINARY: No Dysuria or Hematuria.    MUSCULOSKELETAL:  Complains of chronic lower back pain.    LYMPHATICS:  Denies any abnormal swollen glands anywhere in the body.    NEUROLOGICAL : Positive for chronic tingling and numbness affecting upper extremity and lower extremity since FOLFOX chemotherapy. No headache or dizziness. No seizures or balance problems.    SKIN: Positive for lumps or anterior abdominal wall around surgical site.    PHYSICAL EXAMINATION:      VITAL SIGNS:  Temp:  [96.3 °F (35.7 °C)-97.5 °F (36.4 °C)]  97.3 °F (36.3 °C)  Heart Rate:  [68-85] 70  Resp:  [16-18] 18  BP: (118-150)/(56-68) 118/56    GENERAL:  Not in any distress.    HEENT:  Normocephalic, Atraumatic.conjunctivae  pallor present. No icterus. Extraocular Movements Intact. No Fascial Asymmetry noted.    NECK:  No adenopathy. NO JVD.    RESPIRATORY:  Fair air entry bilateral. No rhonchi or wheezing.    CARDIOVASCULAR:  S1, S2. Regular rate and rhythm. No murmur or gallop appreciated.    ABDOMEN:  Soft, obese, nontender.  2 lumps palpable along the midline surgical incision.  The upper one measures about 6 x 6 cm which has got bigger in recent past.  Bowel sounds present in all four quadrants.  No organomegaly appreciated.    EXTREMITIES:  No edema.No Calf Tenderness.    NEUROLOGIC:  Alert, awake and oriented ×3.          DIAGNOSTIC DATA:    WBC   Date Value Ref Range Status   03/01/2017 8.19 3.20 - 9.80 10*3/mm3 Final     RBC   Date Value Ref Range Status   03/01/2017 3.34 (L) 3.77 - 5.16 10*6/mm3 Final     HEMOGLOBIN   Date Value Ref Range Status   03/01/2017 9.3 (L) 12.0 - 15.5 g/dL Final     HEMATOCRIT   Date Value Ref Range Status   03/01/2017 27.5 (L) 35.0 - 45.0 % Final     MCV   Date Value Ref Range Status   03/01/2017 81.4 80.0 - 98.0 fL Final     MCH   Date Value Ref Range Status   03/01/2017 27.8 26.5 - 34.0 pg Final     MCHC   Date Value Ref Range Status   03/01/2017 34.2 31.4 - 36.0 g/dL Final     RDW   Date Value Ref Range Status   03/01/2017 15.3 (H) 11.5 - 14.5 % Final     RDW-SD   Date Value Ref Range Status   03/01/2017 45.5 36.4 - 46.3 fl Final     MPV   Date Value Ref Range Status   03/01/2017 8.5 8.0 - 12.0 fL Final     PLATELETS   Date Value Ref Range Status   03/01/2017 193 150 - 450 10*3/mm3 Final     NEUTROPHIL %   Date Value Ref Range Status   03/01/2017 67.6 37.0 - 80.0 % Final     LYMPHOCYTE %   Date Value Ref Range Status   03/01/2017 16.1 10.0 - 50.0 % Final     MONOCYTE %   Date Value Ref Range Status   03/01/2017 14.2 (H)  0.0 - 12.0 % Final     EOSINOPHIL %   Date Value Ref Range Status   03/01/2017 1.5 0.0 - 7.0 % Final     BASOPHIL %   Date Value Ref Range Status   03/01/2017 0.1 0.0 - 2.0 % Final     IMMATURE GRANS %   Date Value Ref Range Status   03/01/2017 0.5 0.0 - 0.5 % Final     NEUTROPHILS, ABSOLUTE   Date Value Ref Range Status   03/01/2017 5.54 2.00 - 8.60 10*3/mm3 Final     LYMPHOCYTES, ABSOLUTE   Date Value Ref Range Status   03/01/2017 1.32 0.60 - 4.20 10*3/mm3 Final     MONOCYTES, ABSOLUTE   Date Value Ref Range Status   03/01/2017 1.16 (H) 0.00 - 0.90 10*3/mm3 Final     EOSINOPHILS, ABSOLUTE   Date Value Ref Range Status   03/01/2017 0.12 0.00 - 0.70 10*3/mm3 Final     BASOPHILS, ABSOLUTE   Date Value Ref Range Status   03/01/2017 0.01 0.00 - 0.20 10*3/mm3 Final     IMMATURE GRANS, ABSOLUTE   Date Value Ref Range Status   03/01/2017 0.04 (H) 0.00 - 0.02 10*3/mm3 Final     GLUCOSE   Date Value Ref Range Status   03/01/2017 97 60 - 100 mg/dL Final     SODIUM   Date Value Ref Range Status   03/01/2017 129 (L) 137 - 145 mmol/L Final     POTASSIUM   Date Value Ref Range Status   03/01/2017 3.3 (L) 3.5 - 5.1 mmol/L Final     CO2   Date Value Ref Range Status   03/01/2017 26.0 22.0 - 31.0 mmol/L Final     CHLORIDE   Date Value Ref Range Status   03/01/2017 98 95 - 110 mmol/L Final     ANION GAP   Date Value Ref Range Status   03/01/2017 5.0 5.0 - 15.0 mmol/L Final     CREATININE   Date Value Ref Range Status   03/01/2017 0.70 0.50 - 1.00 mg/dL Final     BUN   Date Value Ref Range Status   03/01/2017 20 7 - 21 mg/dL Final     BUN/CREATININE RATIO   Date Value Ref Range Status   03/01/2017 28.6 (H) 7.0 - 25.0 Final     CALCIUM   Date Value Ref Range Status   03/01/2017 8.2 (L) 8.4 - 10.2 mg/dL Final     EGFR NON  AMER   Date Value Ref Range Status   03/01/2017 82 >60 mL/min/1.73 Final     ALKALINE PHOSPHATASE   Date Value Ref Range Status   03/01/2017 73 38 - 126 U/L Final     TOTAL PROTEIN   Date Value Ref Range  Status   03/01/2017 5.3 (L) 6.3 - 8.6 g/dL Final     ALT (SGPT)   Date Value Ref Range Status   03/01/2017 33 9 - 52 U/L Final     AST (SGOT)   Date Value Ref Range Status   03/01/2017 19 14 - 36 U/L Final     TOTAL BILIRUBIN   Date Value Ref Range Status   03/01/2017 0.6 0.2 - 1.3 mg/dL Final     ALBUMIN   Date Value Ref Range Status   03/01/2017 2.90 (L) 3.40 - 4.80 g/dL Final     GLOBULIN   Date Value Ref Range Status   03/01/2017 2.4 2.3 - 3.5 gm/dL Final     A/G RATIO   Date Value Ref Range Status   03/01/2017 1.2 1.1 - 1.8 g/dL Final     Lab Results   Component Value Date    IRON 42 01/17/2017    TIBC 359 01/17/2017    LABIRON 11.7 (L) 01/17/2017    FERRITIN 43.1 01/17/2017    [id number] 523 10/24/2016    FOLATE 13.70 10/24/2016     Lab Results   Component Value Date    CEA 11.7 (H) 01/17/2017    REFLABREPO SEE NOTE: 02/10/2016   ]    ASSESSMENT AND PLAN:      1. Recurrent metastatic colon cancer. Patient initially was diagnosed with a stage III disease T4 N2 M0 in February 2016. Patient initially had a hemicolectomy followed by adjuvant FOLFOX 6 for 8 cycles last of which was in August 2016. Chemotherapy was held at that point due to worsening performance status side effects as well as recurrent hospital admission. In October 2016 she had a recurrence at the site of anastomosis for which Dr. Quesada did total colectomy. After that patient was seen at [place] for a second opinion, due to PET scan being negative for any metastatic disease except for the site of recurrence the recommended close monitoring for recurrence. In December 2016 around [place] she started feeling a lump around anterior abdominal wall next to the surgical incision for which Dr. Quesada did a CAT scan of chest abdomen and pelvis on January 12, 2017, it showed there was a recurrence and metastatic disease involving anterior abdominal wall mesentery as well as spleen and pelvic sidewall. For which patient was again seen by Dr. Peck at  [place] and recommendation was to start her on Opdivo due to tumor having MSI high status.  Patient has so far gotten 3 cycles of Opdivo last of which was on February 24, 2017.  Her performance status is deteriorating significantly.  It was again discussed with the patient and her family members in the room, purpose of chemotherapy is palliation rather then cure.  If she continues to deteriorate clinically would not recommend any more chemotherapy.  At this point will order CT of chest abdomen and pelvis with by mouth and IV contrast to see what tumor has done after 3 cycles of Opdivo.  Further treatment recommendation based on CT scan.    2.  Generalized weakness and fatigue: Questionable secondary to hypothyroidism versus developing iron deficiency.  At this point we'll send out anemia workup in the morning consisting of iron, TIBC, ferritin, vitamin B12 and folate.  Recommend transfusing when necessary if hemoglobin is less than 7.    3.  Hypothyroidism: Patient's TSH has gone down from 31-19 after increasing the dose of levothyroxine by Dr. Mota.  Recommend continuing with same dose for now.    4.  Black stool: We will do CT of abdomen and pelvis with by mouth contrast to see if there is any evidence of colitis or any tumor affecting the rectal area which could contribute to that.  If CT scan is nondiagnostic then patient might benefit from gastroenterology evaluation.      Thank you for this consultation.    Irving Booth MD  3/1/2017  5:56 PM

## 2017-03-01 NOTE — H&P
[facility] Medicine Admission      Date of Admission: 2017      Primary Care Physician: Kyler Kim MD    Following information is obtained partially from patient, family and/or medical records.    Time of encounter: 2017 at 8:30 PM    Chief Complaint:   Chief Complaint   Patient presents with   • Weakness - Generalized   • Nausea   • Diarrhea   • Vomiting   • Abdominal Pain       HPI: Pt is a 77 y.o. female presenting with known history of colon cancer with metastasis to spleen and abdominal wall presenting today with severe generalized weakness has been going on for the last 2 to months.  She states that it was probably present for this as well however has progressively worsened over the course of this time.  For her cancer she is taking an immune booster along with chemotherapy.  She sees Dr. Booth regularly.  She has numerous questions regarding prognosis and potential outcomes.  At this point she is now unable to do activities of daily living such as showering and changing clothes.  She complains of chronic rectal bleed that has been present since initial diagnosis of colon cancer.  She reports that her rectal bleeding has not changed from baseline.       Concurrent Medical History:   Patient Active Problem List   Diagnosis   • Hypertension   • Osteoporosis   • History of diverticulitis   • History of malignant neoplasm of colon   • Myalgia   • Dysphagia   • Hypothyroidism   • Hyperlipidemia   • Malignant neoplasm of hepatic flexure   • Abdominal wall mass   • Metastatic colon cancer in female   • Nausea with vomiting   • Lower GI bleed       Past Surgical History:   Past Surgical History   Procedure Laterality Date   •  section     • Hysterectomy     • Carpal tunnel release  2014     Left carpal tunnel release   • Cholecystectomy  2016     Laparoscopic converted to open hemicolectomy. Right colon cancer   • Endoscopy and  colonoscopy  02/12/2015     Internal & external hemorrhoids found. Diverticulum in sigmoid colon & descending colon   • Colonoscopy  02/10/2016     No specimens collected. Mild diverticulosis in the sigmoid colon.Malignant appearing tumor in the colon. Biopsied   • Esophagoscopy / egd  02/10/2016     With biopsy-Benign appearing esophageal stenosis.Chronic gastritis.Biopsied.Normal examined duodenum   • Esophagoscopy / egd  02/12/2015     with tube-Esophageal stricture present. Dilatation performed. Gastritis in stomach. Biopsy taken. Normal duodenum   • Venous access device (port) insertion  03/10/2016     Ultrasound guided right Mediport placement under fluoroscopy   • Cataract extraction  10/15/2015     Cataract extraction with intraocular lens implantation, left eye       Family History: family history includes Arthritis in her mother; Cancer in her sister; Diabetes in her father and mother; Heart disease in her father and mother; Hyperlipidemia in her father and mother; Stroke in her father; Vision loss in her father and mother.    Social History:  reports that she has never smoked. She has never used smokeless tobacco. She reports that she does not drink alcohol.    Allergies:   Allergies   Allergen Reactions   • Latex    • Adhesive Tape Rash       Home Medications:   Prior to Admission medications    Medication Sig Start Date End Date Taking? Authorizing Provider   aspirin 81 MG EC tablet Take 81 mg by mouth Every Other Day.   Yes Historical Provider, MD   atorvastatin (LIPITOR) 20 MG tablet Take 20 mg by mouth Every Evening.   Yes Historical Provider, MD   diltiazem CD (CARDIZEM CD) 120 MG 24 hr capsule Take 120 mg by mouth every 12 (twelve) hours.   Yes Historical Provider, MD   escitalopram (LEXAPRO) 10 MG tablet Take 10 mg by mouth daily.   Yes Historical Provider, MD   hydrochlorothiazide (HYDRODIURIL) 25 MG tablet Take 25 mg by mouth daily.   Yes Historical Provider, MD   labetalol (NORMODYNE) 100 MG  tablet Take 100 mg by mouth 2 (two) times a day.   Yes Historical Provider, MD   levothyroxine (SYNTHROID, LEVOTHROID) 100 MCG tablet Take 1 tablet by mouth Daily. 2/13/17  Yes Shravan Mota MD   megestrol (MEGACE) 40 MG/ML suspension Take 10 mL by mouth Daily. 2/20/17  Yes Irving Booth MD   ondansetron (ZOFRAN) 4 MG tablet Take 1 tablet by mouth 4 (Four) Times a Day As Needed for nausea or vomiting. 2/20/17  Yes Irving Booth MD   oxyCODONE (ROXICODONE) 5 MG immediate release tablet Take 1 tablet by mouth Every 8 (Eight) Hours As Needed for moderate pain (4-6) or severe pain (7-10). 2/20/17  Yes Irving Booth MD   pantoprazole (PROTONIX) 40 MG EC tablet Take 40 mg by mouth daily. Take every morning before breakfast   Yes Historical Provider, MD   calcium carbonate-cholecalciferol 500-400 MG-UNIT tablet tablet Take  by mouth daily.  2/28/17  Historical Provider, MD   ferrous sulfate 325 (65 FE) MG tablet Take 325 mg by mouth 3 (three) times a day with meals.  2/28/17  Historical Provider, MD   HYDROcodone-acetaminophen (NORCO) 7.5-325 MG per tablet  11/7/16 2/28/17  Historical Provider, MD   HYDROcodone-acetaminophen (NORCO) 7.5-325 MG per tablet Take 1 tablet by mouth Every 6 (Six) Hours As Needed for moderate pain (4-6). 1/27/17 2/28/17  Irving Booth MD   levothyroxine (SYNTHROID, LEVOTHROID) 75 MCG tablet  1/30/17 2/28/17  Historical Provider, MD   metroNIDAZOLE (FLAGYL) 500 MG tablet  10/24/16 2/28/17  Historical Provider, MD   Multiple Vitamins-Minerals (CENTRUM SILVER ULTRA WOMENS PO) Take 1 tablet by mouth daily.  2/28/17  Historical Provider, MD   neomycin (MYCIFRADIN) 500 MG tablet  10/24/16 2/28/17  Historical Provider, MD   ondansetron (ZOFRAN) 4 MG tablet Take 4 mg by mouth every 8 (eight) hours as needed for nausea or vomiting.  2/28/17  Historical Provider, MD   potassium chloride (K-DUR,KLOR-CON) 20 MEQ CR tablet Take 20 mEq by mouth 2 (two) times a day.  2/28/17  Historical Provider, MD        Review of Systems:  Review of Systems   Constitutional: Positive for fatigue.   HENT: Negative.    Eyes: Negative.    Respiratory: Negative.    Cardiovascular: Negative.    Gastrointestinal: Positive for blood in stool, diarrhea, nausea and vomiting.   Endocrine: Negative.    Genitourinary: Negative.    Skin: Negative.    Neurological: Positive for weakness.      Otherwise complete ROS is negative except as mentioned above and in HPI.    Physical Exam:   Temp:  [97.5 °F (36.4 °C)] 97.5 °F (36.4 °C)  Heart Rate:  [76-93] 76  Resp:  [16-17] 16  BP: (136-153)/(62-70) 150/68  Physical Exam   Constitutional: She is oriented to person, place, and time. She appears well-developed and well-nourished.   HENT:   Head: Normocephalic and atraumatic.   Nose: Nose normal.   Mouth/Throat: Oropharynx is clear and moist.   Eyes: Conjunctivae are normal. Pupils are equal, round, and reactive to light. No scleral icterus.   Neck: No tracheal deviation present.   Cardiovascular: Normal heart sounds.  Exam reveals no friction rub.    Pulmonary/Chest: Effort normal and breath sounds normal. No respiratory distress. She has no wheezes. She has no rales.   Abdominal: Soft. Bowel sounds are normal. She exhibits no distension. There is no tenderness.   Musculoskeletal: Normal range of motion.   Neurological: She is alert and oriented to person, place, and time.   Skin: Skin is warm and dry.   Psychiatric: Her affect is blunt. Her speech is delayed. She is slowed. She exhibits a depressed mood.         Results Reviewed:  I have personally reviewed current lab, radiology, and data and agree with results.  Lab Results (last 24 hours)     Procedure Component Value Units Date/Time    Influenza Antigen [[id number]]  (Normal) Collected:  02/28/17 1758    Specimen:  Swab from Nasopharynx Updated:  02/28/17 1822     Influenza A Ag, EIA Negative      Influenza B Ag, EIA Negative     [place] Draw [[id number]] Collected:  02/28/17 1827    Specimen:   Blood Updated:  02/28/17 1832    Narrative:       The following orders were created for panel order [place] Draw.  Procedure                               Abnormality         Status                     ---------                               -----------         ------                     Light Blue Top[[id number]]                                    In process                 Green Top (Gel)[[id number]]                                   In process                 Lavender Top[[id number]]                                      In process                 Gold Top - SST[[id number]]                                    In process                   Please view results for these tests on the individual orders.    Lavender Top [[id number]] Collected:  02/28/17 1827    Specimen:  Blood Updated:  02/28/17 1832    Light Blue Top [[id number]] Collected:  02/28/17 1827    Specimen:  Blood Updated:  02/28/17 1832    Gold Top - SST [[id number]] Collected:  02/28/17 1827    Specimen:  Blood Updated:  02/28/17 1832    Green Top (Gel) [[id number]] Collected:  02/28/17 1827    Specimen:  Blood Updated:  02/28/17 1832    Blood Culture [[id number]] Collected:  02/28/17 1827    Specimen:  Blood from Arm, Left Updated:  02/28/17 1832    Blood Culture [[id number]] Collected:  02/28/17 1827    Specimen:  Blood from Arm, Left Updated:  02/28/17 1832    Lactic Acid, Plasma [[id number]]  (Normal) Collected:  02/28/17 1827    Specimen:  Blood Updated:  02/28/17 1845     Lactate 1.0 mmol/L     Comprehensive Metabolic Panel [[id number]]  (Abnormal) Collected:  02/28/17 1827    Specimen:  Blood Updated:  02/28/17 1846     Glucose 90 mg/dL      BUN 25 (H) mg/dL      Creatinine 0.75 mg/dL      Sodium 130 (L) mmol/L      Potassium 3.1 (L) mmol/L      Chloride 92 (L) mmol/L      CO2 27.0 mmol/L      Calcium 8.7 mg/dL      Total Protein 5.4 (L) g/dL      Albumin 3.30 (L) g/dL      ALT (SGPT) 30 U/L      AST (SGOT) 19 U/L      Alkaline Phosphatase 88 U/L      Total Bilirubin 0.6  mg/dL      eGFR Non African Amer 75 mL/min/1.73      Globulin 2.1 (L) gm/dL      A/G Ratio 1.6 g/dL      BUN/Creatinine Ratio 33.3 (H)      Anion Gap 11.0 mmol/L     Narrative:       The MDRD GFR formula is only valid for adults with stable renal function between ages 18 and 70.    Protime-INR [[id number]]  (Abnormal) Collected:  02/28/17 1827    Specimen:  Blood Updated:  02/28/17 1853     Protime 15.7 (H) Seconds      INR 1.26 (H)     Narrative:       Therapeutic range for most indications is 2.0-3.0 INR,  or 2.5-3.5 for mechanical heart valves.    Scan Slide [[id number]] Collected:  02/28/17 1827    Specimen:  Blood Updated:  02/28/17 1854    CBC & Differential [[id number]] Collected:  02/28/17 1827    Specimen:  Blood Updated:  02/28/17 1854    Narrative:       The following orders were created for panel order CBC & Differential.  Procedure                               Abnormality         Status                     ---------                               -----------         ------                     Scan Slide[[id number]]                                        In process                 CBC Auto Differential[[id number]]         Abnormal            Final result                 Please view results for these tests on the individual orders.    CBC Auto Differential [[id number]]  (Abnormal) Collected:  02/28/17 1827    Specimen:  Blood Updated:  02/28/17 1854     WBC 11.41 (H) 10*3/mm3      RBC 3.88 10*6/mm3      Hemoglobin 10.8 (L) g/dL      Hematocrit 31.6 (L) %      MCV 81.4 fL      MCH 27.8 pg      MCHC 34.2 g/dL      RDW 14.9 (H) %      RDW-SD 44.2 fl      MPV 8.9 fL      Platelets 206 10*3/mm3      Neutrophil % 77.5 %      Lymphocyte % 7.4 (L) %      Monocyte % 13.5 (H) %      Eosinophil % 1.1 %      Basophil % 0.1 %      Immature Grans % 0.4 %      Neutrophils, Absolute 8.85 (H) 10*3/mm3      Lymphocytes, Absolute 0.84 10*3/mm3      Monocytes, Absolute 1.54 (H) 10*3/mm3      Eosinophils, Absolute 0.13 10*3/mm3       Basophils, Absolute 0.01 10*3/mm3      Immature Grans, Absolute 0.04 (H) 10*3/mm3     Troponin [[id number]]  (Normal) Collected:  02/28/17 1827    Specimen:  Blood Updated:  02/28/17 1857     Troponin I <0.012 ng/mL         Imaging Results (last 24 hours)     Procedure Component Value Units Date/Time    XR Chest 1 View [[id number]] Collected:  02/28/17 1906     Updated:  02/28/17 1910    Narrative:       Patient Name:  GRETCHEN LYNO  Patient ID:  [id number]   Ordering:  KAREN HOLLIDAY  Attending:  KAREN HOLLIDAY  Referring:  KAREN HOLLIDAY  ------------------------------------------------  PROCEDURE: XR CHEST 1 VIEW    INDICATION FOR PROCEDURE:  77 years -old patient presents for  evaluation of a simple sepsis protocol.    COMPARISON:  Chest radiograph dated August 3, 2015    FINDINGS: XR CHEST 1 view  reveals the lungs are expanded.  There  is mild elevation of the right hemidiaphragm. Patient has  right-sided Mediport catheter with the tip of the catheter in the  superior vena cava. Cardiac monitoring leads are present.    There is atherosclerotic calcification of the thoracic aorta.  Thoracic aorta is mildly tortuous. There is no radiographic  evidence for airspace consolidation, pleural effusion or  pneumothorax. Mediastinal and cardiac silhouettes are within  normal limits.     The skeletal structures are within normal limits.       Impression:       No radiographic evidence of acute cardiopulmonary  disease.    Electronically signed by:  Rashmi Longoria MD  2/28/2017 7:09 PM [facility]  Workstation: [id number]              Assessment and Plan:   1.  Colon cancer with metastases: Will consult Dr. Booth for input.   2.  Hypothyroidism: Patient's TSH is 19 as of last checked.  This may be contributing to her generalized weakness.  Will give IV replacement for tonight.  3.  Chronic anemia: Secondary to chronic rectal bleed. Will obtain serial H&H.  Monitor for trend.    I discussed the patients findings and my recommendations  with:     Javier Peña MD  02/28/17  8:47 PM

## 2017-03-01 NOTE — PLAN OF CARE
Problem: Patient Care Overview (Adult)  Goal: Plan of Care Review  Outcome: Ongoing (interventions implemented as appropriate)    03/01/17 0217   Coping/Psychosocial Response Interventions   Plan Of Care Reviewed With patient   Patient Care Overview   Progress no change   Outcome Evaluation   Outcome Summary/Follow up Plan Patient complains of pain. Pain medication administered as ordered with relief. Labs monitored. patient has yet to have BM. IVF infusing. Patient on telemetry, no calls thus far in shift. Vital signs stable. Will continue to monitor patient.        Goal: Adult Individualization and Mutuality  Outcome: Ongoing (interventions implemented as appropriate)  Goal: Discharge Needs Assessment  Outcome: Ongoing (interventions implemented as appropriate)    Problem: Gastrointestinal Bleeding (Adult)  Goal: Signs and Symptoms of Listed Potential Problems Will be Absent or Manageable (Gastrointestinal Bleeding)  Outcome: Ongoing (interventions implemented as appropriate)    Problem: Pain, Acute (Adult)  Goal: Identify Related Risk Factors and Signs and Symptoms  Outcome: Outcome(s) achieved Date Met:  03/01/17  Goal: Acceptable Pain Control/Comfort Level  Outcome: Ongoing (interventions implemented as appropriate)

## 2017-03-01 NOTE — PROGRESS NOTES
Discharge Planning Assessment  [facility]     Patient Name: Bernadette Camarena  MRN: [id number]  Today's Date: 3/1/2017    Admit Date: 2/28/2017          Discharge Needs Assessment       03/01/17 1510    Living Environment    Lives With friend(s)    Living Arrangements house    Provides Primary Care For pet(s)    Quality Of Family Relationships supportive    Able to Return to Prior Living Arrangements yes    Discharge Needs Assessment    Concerns To Be Addressed no discharge needs identified    Readmission Within The Last 30 Days no previous admission in last 30 days    Outpatient/Agency/Support Group Needs infusion therapy, outpatient (specify)    Equipment Currently Used at Home walker, rolling;shower chair    Equipment Needed After Discharge none    Transportation Available family or friend will provide    Current Discharge Risk chronically ill    Discharge Disposition home or self-care            Discharge Plan       03/01/17 1515    Case Management/Social Work Plan    Plan Home no services    Patient/Family In Agreement With Plan yes    Additional Comments Sw spoke with pt at bedside. Facesheet information verified. Pt receiving [facility] Chemotherapy at [facility]. Pt has good friends and family support. Pt states she does not use any DME other than a shower chair but states she stays very weak from chemotherapy and has to have help from her friend whom she resides with. Pt not agreeable to  services unless MD recommends. Pt currently in observation status. Pt voiced no other needs at this time...ROSARIO Adan.        Discharge Placement     No information found                Demographic Summary       03/01/17 1509    Referral Information    Admission Type observation    Arrived From home or self-care    Referral Source physician    Reason For Consult discharge planning    Record Reviewed history and physical    Primary Care Physician Information    Name Judy            Functional Status        03/01/17 1509    Functional Status Prior    Ambulation 0-->independent    Transferring 0-->independent    Toileting 0-->independent    Bathing 1-->assistive equipment    Dressing 0-->independent    Eating 0-->independent    Communication 0-->understands/communicates without difficulty    Swallowing 0-->swallows foods/liquids without difficulty    IADL    Medications independent    Meal Preparation assistive person    Housekeeping assistive person    Laundry assistive person    Shopping assistive person    Oral Care independent    Cognitive/Perceptual/Developmental    Current Mental Status/Cognitive Functioning no deficits noted    Recent Changes in Mental Status/Cognitive Functioning no changes    Developmental Stage (Eriksson's Stages of Development) Stage 8 (65 years-death/Late Adulthood) Integrity vs. Despair            Psychosocial     None            Abuse/Neglect     None            Legal     None            Substance Abuse     None            Patient Forms     None          Abbey Pat

## 2017-03-01 NOTE — TELEPHONE ENCOUNTER
Called daughter--explained MD-Leobardo has Conference today at noon, MD will not be able to come to hospital room until around 5-6 pm tonight.

## 2017-03-01 NOTE — TELEPHONE ENCOUNTER
Daughter is requesting time to discuss patient treatment plan of care, question regarding quality of life.  Pt is in the hospital Room # 402.  Requesting MD-Leobardo to come to room for discussion or call #/[phone].  Daughter has verified password as Nicol.

## 2017-03-01 NOTE — PLAN OF CARE
Problem: Patient Care Overview (Adult)  Goal: Plan of Care Review  Outcome: Ongoing (interventions implemented as appropriate)  Pt has been in pain since report given by INGRIS Finley; medicated; will continue to monitor.     Problem: Gastrointestinal Bleeding (Adult)  Goal: Signs and Symptoms of Listed Potential Problems Will be Absent or Manageable (Gastrointestinal Bleeding)  Outcome: Ongoing (interventions implemented as appropriate)    Problem: Pain, Acute (Adult)  Goal: Acceptable Pain Control/Comfort Level  Outcome: Ongoing (interventions implemented as appropriate)

## 2017-03-01 NOTE — PROGRESS NOTES
hospitalist  Active Problems:    Lower GI bleed        Subjective: Still some right lower abdominal pain.  No nausea or vomiting.  Had bowel movement which was black.  No dizziness.  Continued generalized weakness.  No chest pain or shortness of breath.  No other new complaints.  Multiple family members are present at bedside.  Spoke with nursing staff.        Review of Systems:    Review of Systems   Constitutional: Negative for chills and fever.   All other systems reviewed and are negative.      Objective     Vital Signs:  Temp:  [96.3 °F (35.7 °C)-97.5 °F (36.4 °C)] 96.3 °F (35.7 °C)  Heart Rate:  [68-93] 68  Resp:  [16-18] 18  BP: (120-153)/(59-70) 130/61    Physical Exam:   Physical Exam   Constitutional: She is oriented to person, place, and time. She appears well-developed and well-nourished. No distress.   HENT:   Mouth/Throat: Oropharynx is clear and moist.   Eyes: Conjunctivae are normal. No scleral icterus.   Neck: Neck supple. No JVD present. No thyromegaly present.   Cardiovascular: Normal rate, regular rhythm and normal heart sounds.    Pulmonary/Chest: Effort normal and breath sounds normal. No respiratory distress.   Abdominal: Soft. She exhibits no distension. There is tenderness in the right lower quadrant. There is no rebound and no guarding.       Musculoskeletal: Normal range of motion. She exhibits no edema.   Neurological: She is alert and oriented to person, place, and time. No cranial nerve deficit.   Skin: Skin is warm and dry. No rash noted. She is not diaphoretic. No pallor.   Psychiatric: She has a normal mood and affect. Her behavior is normal.          Results Review:       Lab Results (last 24 hours)     Procedure Component Value Units Date/Time    Influenza Antigen [[id number]]  (Normal) Collected:  02/28/17 1758    Specimen:  Swab from Nasopharynx Updated:  02/28/17 1822     Influenza A Ag, EIA Negative      Influenza B Ag, EIA Negative     Lactic Acid, Plasma [[id number]]  (Normal)  Collected:  02/28/17 1827    Specimen:  Blood Updated:  02/28/17 1845     Lactate 1.0 mmol/L     Comprehensive Metabolic Panel [[id number]]  (Abnormal) Collected:  02/28/17 1827    Specimen:  Blood Updated:  02/28/17 1846     Glucose 90 mg/dL      BUN 25 (H) mg/dL      Creatinine 0.75 mg/dL      Sodium 130 (L) mmol/L      Potassium 3.1 (L) mmol/L      Chloride 92 (L) mmol/L      CO2 27.0 mmol/L      Calcium 8.7 mg/dL      Total Protein 5.4 (L) g/dL      Albumin 3.30 (L) g/dL      ALT (SGPT) 30 U/L      AST (SGOT) 19 U/L      Alkaline Phosphatase 88 U/L      Total Bilirubin 0.6 mg/dL      eGFR Non African Amer 75 mL/min/1.73      Globulin 2.1 (L) gm/dL      A/G Ratio 1.6 g/dL      BUN/Creatinine Ratio 33.3 (H)      Anion Gap 11.0 mmol/L     Narrative:       The MDRD GFR formula is only valid for adults with stable renal function between ages 18 and 70.    Protime-INR [[id number]]  (Abnormal) Collected:  02/28/17 1827    Specimen:  Blood Updated:  02/28/17 1853     Protime 15.7 (H) Seconds      INR 1.26 (H)     Narrative:       Therapeutic range for most indications is 2.0-3.0 INR,  or 2.5-3.5 for mechanical heart valves.    CBC Auto Differential [[id number]]  (Abnormal) Collected:  02/28/17 1827    Specimen:  Blood Updated:  02/28/17 1854     WBC 11.41 (H) 10*3/mm3      RBC 3.88 10*6/mm3      Hemoglobin 10.8 (L) g/dL      Hematocrit 31.6 (L) %      MCV 81.4 fL      MCH 27.8 pg      MCHC 34.2 g/dL      RDW 14.9 (H) %      RDW-SD 44.2 fl      MPV 8.9 fL      Platelets 206 10*3/mm3      Neutrophil % 77.5 %      Lymphocyte % 7.4 (L) %      Monocyte % 13.5 (H) %      Eosinophil % 1.1 %      Basophil % 0.1 %      Immature Grans % 0.4 %      Neutrophils, Absolute 8.85 (H) 10*3/mm3      Lymphocytes, Absolute 0.84 10*3/mm3      Monocytes, Absolute 1.54 (H) 10*3/mm3      Eosinophils, Absolute 0.13 10*3/mm3      Basophils, Absolute 0.01 10*3/mm3      Immature Grans, Absolute 0.04 (H) 10*3/mm3     Troponin [[id number]]  (Normal)  Collected:  02/28/17 1827    Specimen:  Blood Updated:  02/28/17 1857     Troponin I <0.012 ng/mL     CBC & Differential [[id number]] Collected:  02/28/17 1827    Specimen:  Blood Updated:  02/28/17 2200    Narrative:       The following orders were created for panel order CBC & Differential.  Procedure                               Abnormality         Status                     ---------                               -----------         ------                     Scan Slide[[id number]]                                        Final result               CBC Auto Differential[[id number]]         Abnormal            Final result                 Please view results for these tests on the individual orders.    Scan Slide [[id number]] Collected:  02/28/17 1827    Specimen:  Blood Updated:  02/28/17 2200     Anisocytosis Slight/1+      Hypochromia Slight/1+      WBC Morphology Normal      Platelet Estimate Adequate     Nettie Draw [[id number]] Collected:  02/28/17 1827    Specimen:  Blood Updated:  02/28/17 2302    Narrative:       The following orders were created for panel order Nettie Draw.  Procedure                               Abnormality         Status                     ---------                               -----------         ------                     Light Blue Top[[id number]]                                    Final result               Green Top (Gel)[[id number]]                                   Final result               Lavender Top[[id number]]                                      Final result               Gold Top - SST[[id number]]                                    Final result                 Please view results for these tests on the individual orders.    Light Blue Top [[id number]] Collected:  02/28/17 1827    Specimen:  Blood Updated:  02/28/17 2302     Extra Tube hold for add-on       Auto resulted       Green Top (Gel) [[id number]] Collected:  02/28/17 1827    Specimen:  Blood Updated:  02/28/17 2302     Extra Tube  Hold for add-ons.       Auto resulted.       Lavender Top [[id number]] Collected:  02/28/17 1827    Specimen:  Blood Updated:  02/28/17 2302     Extra Tube hold for add-on       Auto resulted       Gold Top - SST [[id number]] Collected:  02/28/17 1827    Specimen:  Blood Updated:  02/28/17 2302     Extra Tube Hold for add-ons.       Auto resulted.       Hemoglobin & Hematocrit, Blood [[id number]]  (Abnormal) Collected:  03/01/17 0014    Specimen:  Blood Updated:  03/01/17 0046     Hemoglobin 9.3 (L) g/dL      Hematocrit 27.2 (L) %     Comprehensive Metabolic Panel [[id number]]  (Abnormal) Collected:  03/01/17 0014    Specimen:  Blood Updated:  03/01/17 0102     Glucose 97 mg/dL      BUN 20 mg/dL      Creatinine 0.70 mg/dL      Sodium 129 (L) mmol/L      Potassium 3.3 (L) mmol/L      Chloride 98 mmol/L      CO2 26.0 mmol/L      Calcium 8.2 (L) mg/dL      Total Protein 5.3 (L) g/dL      Albumin 2.90 (L) g/dL      ALT (SGPT) 33 U/L      AST (SGOT) 19 U/L      Alkaline Phosphatase 73 U/L      Total Bilirubin 0.6 mg/dL      eGFR Non African Amer 82 mL/min/1.73      Globulin 2.4 gm/dL      A/G Ratio 1.2 g/dL      BUN/Creatinine Ratio 28.6 (H)      Anion Gap 5.0 mmol/L     Narrative:       The MDRD GFR formula is only valid for adults with stable renal function between ages 18 and 70.    CBC Auto Differential [[id number]]  (Abnormal) Collected:  03/01/17 0014    Specimen:  Blood Updated:  03/01/17 0103     WBC 8.19 10*3/mm3      RBC 3.34 (L) 10*6/mm3      Hemoglobin 9.3 (L) g/dL      Hematocrit 27.2 (L) %      MCV 81.4 fL      MCH 27.8 pg      MCHC 34.2 g/dL      RDW 15.3 (H) %      RDW-SD 45.5 fl      MPV 8.5 fL      Platelets 193 10*3/mm3      Neutrophil % 67.6 %      Lymphocyte % 16.1 %      Monocyte % 14.2 (H) %      Eosinophil % 1.5 %      Basophil % 0.1 %      Immature Grans % 0.5 %      Neutrophils, Absolute 5.54 10*3/mm3      Lymphocytes, Absolute 1.32 10*3/mm3      Monocytes, Absolute 1.16 (H) 10*3/mm3      Eosinophils,  Absolute 0.12 10*3/mm3      Basophils, Absolute 0.01 10*3/mm3      Immature Grans, Absolute 0.04 (H) 10*3/mm3     TSH [[id number]]  (Abnormal) Collected:  03/01/17 0014    Specimen:  Blood Updated:  03/01/17 0132     TSH 28.800 (H) mIU/mL     Urinalysis With / Culture If Indicated [[id number]]  (Abnormal) Collected:  03/01/17 0455    Specimen:  Urine from Urine, Clean Catch Updated:  03/01/17 0552     Color, UA Dark Yellow      Appearance, UA Clear      pH, UA 6.0      Specific Gravity, UA 1.028       Result obtained by Refractometer        Glucose, UA Negative      Ketones, UA Negative      Bilirubin, UA Small (1+) (A)      Blood, UA Negative      Protein, UA Negative      Leuk Esterase, UA Moderate (2+) (A)      Nitrite, UA Negative      Urobilinogen, UA 1.0 E.U./dL     Urinalysis, Microscopic Only [[id number]]  (Abnormal) Collected:  03/01/17 0455    Specimen:  Urine from Urine, Clean Catch Updated:  03/01/17 0552     RBC, UA 0-2 (A) /HPF      WBC, UA Too Numerous to Count (A) /HPF      Bacteria, UA None Seen /HPF      Squamous Epithelial Cells, UA 3-5 (A) /HPF      Hyaline Casts, UA 13-20 /LPF      Methodology Automated Microscopy     Urine Culture [[id number]]  (Normal) Collected:  03/01/17 0455    Specimen:  Urine from Urine, Clean Catch Updated:  03/01/17 0615     Urine Culture Culture in progress     Blood Culture [[id number]]  (Normal) Collected:  02/28/17 1827    Specimen:  Blood from Arm, Left Updated:  03/01/17 0701     Blood Culture No growth at less than 24 hours     Blood Culture [[id number]]  (Normal) Collected:  02/28/17 1827    Specimen:  Blood from Arm, Left Updated:  03/01/17 0701     Blood Culture No growth at less than 24 hours     Hemoglobin & Hematocrit, Blood [[id number]]  (Abnormal) Collected:  03/01/17 0736    Specimen:  Blood Updated:  03/01/17 0759     Hemoglobin 9.3 (L) g/dL      Hematocrit 27.5 (L) %         Imaging Results (last 24 hours)     Procedure Component Value Units Date/Time    XR Chest 1  View [[id number]] Collected:  02/28/17 1906     Updated:  02/28/17 1910    Narrative:       Patient Name:  GRETCHEN LYON  Patient ID:  [id number]   Ordering:  KAREN HOLLIDAY  Attending:  KAREN HOLLIDAY  Referring:  KAREN HOLLIDAY  ------------------------------------------------  PROCEDURE: XR CHEST 1 VIEW    INDICATION FOR PROCEDURE:  77 years -old patient presents for  evaluation of a simple sepsis protocol.    COMPARISON:  Chest radiograph dated August 3, 2015    FINDINGS: XR CHEST 1 view  reveals the lungs are expanded.  There  is mild elevation of the right hemidiaphragm. Patient has  right-sided Mediport catheter with the tip of the catheter in the  superior vena cava. Cardiac monitoring leads are present.    There is atherosclerotic calcification of the thoracic aorta.  Thoracic aorta is mildly tortuous. There is no radiographic  evidence for airspace consolidation, pleural effusion or  pneumothorax. Mediastinal and cardiac silhouettes are within  normal limits.     The skeletal structures are within normal limits.       Impression:       No radiographic evidence of acute cardiopulmonary  disease.    Electronically signed by:  Rashmi Longoria MD  2/28/2017 7:09 PM CST  Workstation: [employer]            Medication Review: medications have been reviewed    Assessment/Plan:  1. Colon cancer with metastases: Will consult Dr. Booth for input.   2.  Hypothyroidism: Patient's TSH is 19 as of last checked.  Now it is 28 .This may be contributing to her generalized weakness.   -Increase dose of levothyroxine  3. Chronic anemia: Secondary to chronic rectal bleed. Will obtain serial H&H.  Monitor for trend.     -When necessary GI consultation if needed, we will decide after Dr. Booth's input.  4.  Generalized weakness with functional decline  -Consult case management for discharge planning  5 hypokalemia  -Replace potassium  6 hyponatremia, mild  -Monitor  DVT prophylaxis: SCD, no anticoagulation due to GI  bleeding            Kelly Delarosa MD  03/01/17  4:15 PM

## 2017-03-02 NOTE — PROGRESS NOTES
HISTORY OF PRESENT ILLNESS:    Patient is feeling little better.  Complains of multiple bowel movements since last night after drinking contrast for the key CT scan.  Complains of pain in the abdominal area.  Denies any fever or chills.      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 6 of 24 lymph nodes positive   • Hordeolum    • Hyperlipidemia    • Hypertension    • Peripheral venous insufficiency    • Posterior subcapsular polar senile cataract      Left   • Stricture of esophagus    • URI (upper respiratory infection)    • UTI (urinary tract infection)    • Visual discomfort      Left       PAST SURGICAL HISTORY:  Past Surgical History   Procedure Laterality Date   •  section     • Hysterectomy     • Carpal tunnel release  2014     Left carpal tunnel release   • Cholecystectomy  2016     Laparoscopic converted to open hemicolectomy. Right colon cancer   • Endoscopy and colonoscopy  2015     Internal & external hemorrhoids found. Diverticulum in sigmoid colon & descending colon   • Colonoscopy  02/10/2016     No specimens collected. Mild diverticulosis in the sigmoid colon.Malignant appearing tumor in the colon. Biopsied   • Esophagoscopy / egd  02/10/2016     With biopsy-Benign appearing esophageal stenosis.Chronic gastritis.Biopsied.Normal examined duodenum   • Esophagoscopy / egd  2015     with tube-Esophageal stricture present. Dilatation performed. Gastritis in stomach. Biopsy taken. Normal duodenum   • Venous access device (port) insertion  03/10/2016     Ultrasound  guided right Mediport placement under fluoroscopy   • Cataract extraction  10/15/2015     Cataract extraction with intraocular lens implantation, left eye       ALLERGIES:    Allergies   Allergen Reactions   • Latex    • Adhesive Tape Rash       SOCIAL HISTORY:   Social History   Substance Use Topics   • Smoking status: Never Smoker   • Smokeless tobacco: Never Used   • Alcohol use No       CURRENT MEDICATIONS:    Current Facility-Administered Medications   Medication Dose Route Frequency Provider Last Rate Last Dose   • aspirin EC tablet 81 mg  81 mg Oral Every Other Day Javier Peña MD   81 mg at 03/02/17 0833   • atorvastatin (LIPITOR) tablet 20 mg  20 mg Oral Q PM Javier Peña MD   20 mg at 03/02/17 1734   • diltiaZEM CD (CARDIZEM CD) 24 hr capsule 120 mg  120 mg Oral Q24H Javier Peña MD   120 mg at 03/02/17 0834   • escitalopram (LEXAPRO) tablet 10 mg  10 mg Oral Daily Javier Peña MD   10 mg at 03/02/17 0833   • fentaNYL (DURAGESIC) 12 MCG/HR 1 patch  1 patch Transdermal Q72H Irving Booth MD   1 patch at 03/02/17 1734   • hydrochlorothiazide (HYDRODIURIL) tablet 25 mg  25 mg Oral Daily Javier Peña MD   25 mg at 03/02/17 0834   • labetalol (NORMODYNE) tablet 100 mg  100 mg Oral BID Javier Peña MD   100 mg at 03/02/17 1734   • levothyroxine (SYNTHROID, LEVOTHROID) tablet 125 mcg  125 mcg Oral Q AM Kelly Delarosa MD   125 mcg at 03/02/17 0632   • megestrol (MEGACE) 40 MG/ML suspension 400 mg  400 mg Oral Daily Javier Peña MD   400 mg at 03/02/17 0833   • Morphine sulfate (PF) injection 1 mg  1 mg Intravenous Q4H PRN Javier Peña MD   1 mg at 02/28/17 2252    And   • naloxone (NARCAN) injection 0.4 mg  0.4 mg Intravenous Q5 Min PRN Javier Peña MD       • ondansetron (ZOFRAN) tablet 4 mg  4 mg Oral 4x Daily PRN Javier Peña MD   4 mg at 03/02/17 1031   • oxyCODONE (ROXICODONE) immediate release tablet 5 mg  5 mg Oral Q8H PRN Javier Peña MD       •  oxyCODONE-acetaminophen (PERCOCET) 7.5-325 MG per tablet 1 tablet  1 tablet Oral Q4H PRN Javier Peña MD   1 tablet at 03/02/17 1625   • pantoprazole (PROTONIX) EC tablet 40 mg  40 mg Oral Daily Javier Peña MD   40 mg at 03/02/17 0834   • sodium chloride 0.9 % flush 1-10 mL  1-10 mL Intravenous PRN Javier Peña MD       • sodium chloride 0.9 % flush 10 mL  10 mL Intravenous PRN Jeff Wilder MD       • sodium chloride 0.9 % infusion  100 mL/hr Intravenous Continuous Javier Peña  mL/hr at 03/02/17 1417 100 mL/hr at 03/02/17 1417            FAMILY HISTORY:    Family History   Problem Relation Age of Onset   • Diabetes Mother    • Heart disease Mother    • Hyperlipidemia Mother    • Vision loss Mother    • Arthritis Mother    • Diabetes Father    • Heart disease Father    • Hyperlipidemia Father    • Vision loss Father    • Stroke Father    • Cancer Sister        REVIEW OF SYSTEMS:      CONSTITUTIONAL:  Complains of fatigue.  Complains generalized weakness.  Complains of weight loss and poor appetite.  Denies any fever, chills.    HEENT:  No epistaxis, mouth sores or difficulty swallowing.    RESPIRATORY:  No new shortness of breath. No new cough or hemoptysis.    CARDIOVASCULAR:  No chest pain or palpitations.    GASTROINTESTINAL: Complains of abdominal pain at the site of lump in anterior abdominal wall.  Complains of blood in the stool or dark stool for last 4-5 months.  Complains of intermittent nausea.  Complains of multiple loose bowel movements since yesterday due to contrast from CT scan.     GENITOURINARY: No Dysuria or Hematuria.    MUSCULOSKELETAL:  Complains of chronic lower back pain.    LYMPHATICS:  Denies any abnormal swollen glands anywhere in the body.    NEUROLOGICAL : Positive for chronic tingling and numbness affecting upper extremity and lower extremity since FOLFOX chemotherapy. No headache or dizziness. No seizures or balance problems.    SKIN: Positive for lumps or  anterior abdominal wall around surgical site.    PHYSICAL EXAMINATION:      VITAL SIGNS:  Temp:  [96.9 °F (36.1 °C)-97.5 °F (36.4 °C)] 97.4 °F (36.3 °C)  Heart Rate:  [66-78] 74  Resp:  [18] 18  BP: (131-167)/(59-70) 132/59    GENERAL:  Not in any distress.    HEENT:  Normocephalic, Atraumatic.conjunctivae  pallor present. No icterus. Extraocular Movements Intact. No Fascial Asymmetry noted.    NECK:  No adenopathy. NO JVD.    RESPIRATORY:  Fair air entry bilateral. No rhonchi or wheezing.    CARDIOVASCULAR:  S1, S2. Regular rate and rhythm. No murmur or gallop appreciated.    ABDOMEN:  Soft, obese, nontender.  2 lumps palpable along the midline surgical incision.  The upper one measures about 6 x 6 cm which has got bigger in recent past.  Bowel sounds present in all four quadrants.  No organomegaly appreciated.    EXTREMITIES:  No edema.No Calf Tenderness.    NEUROLOGIC:  Alert, awake and oriented ×3.          DIAGNOSTIC DATA:    WBC   Date Value Ref Range Status   03/02/2017 6.66 3.20 - 9.80 10*3/mm3 Final     RBC   Date Value Ref Range Status   03/02/2017 3.07 (L) 3.77 - 5.16 10*6/mm3 Final     HEMOGLOBIN   Date Value Ref Range Status   03/02/2017 8.7 (L) 12.0 - 15.5 g/dL Final     HEMATOCRIT   Date Value Ref Range Status   03/02/2017 25.4 (L) 35.0 - 45.0 % Final     MCV   Date Value Ref Range Status   03/02/2017 82.7 80.0 - 98.0 fL Final     MCH   Date Value Ref Range Status   03/02/2017 28.3 26.5 - 34.0 pg Final     MCHC   Date Value Ref Range Status   03/02/2017 34.3 31.4 - 36.0 g/dL Final     RDW   Date Value Ref Range Status   03/02/2017 15.1 (H) 11.5 - 14.5 % Final     RDW-SD   Date Value Ref Range Status   03/02/2017 45.2 36.4 - 46.3 fl Final     MPV   Date Value Ref Range Status   03/02/2017 9.0 8.0 - 12.0 fL Final     PLATELETS   Date Value Ref Range Status   03/02/2017 207 150 - 450 10*3/mm3 Final     NEUTROPHIL %   Date Value Ref Range Status   03/02/2017 66.2 37.0 - 80.0 % Final     LYMPHOCYTE %    Date Value Ref Range Status   03/02/2017 17.0 10.0 - 50.0 % Final     MONOCYTE %   Date Value Ref Range Status   03/02/2017 11.9 0.0 - 12.0 % Final     EOSINOPHIL %   Date Value Ref Range Status   03/02/2017 3.8 0.0 - 7.0 % Final     BASOPHIL %   Date Value Ref Range Status   03/02/2017 0.5 0.0 - 2.0 % Final     IMMATURE GRANS %   Date Value Ref Range Status   03/02/2017 0.6 (H) 0.0 - 0.5 % Final     NEUTROPHILS, ABSOLUTE   Date Value Ref Range Status   03/02/2017 4.42 2.00 - 8.60 10*3/mm3 Final     LYMPHOCYTES, ABSOLUTE   Date Value Ref Range Status   03/02/2017 1.13 0.60 - 4.20 10*3/mm3 Final     MONOCYTES, ABSOLUTE   Date Value Ref Range Status   03/02/2017 0.79 0.00 - 0.90 10*3/mm3 Final     EOSINOPHILS, ABSOLUTE   Date Value Ref Range Status   03/02/2017 0.25 0.00 - 0.70 10*3/mm3 Final     BASOPHILS, ABSOLUTE   Date Value Ref Range Status   03/02/2017 0.03 0.00 - 0.20 10*3/mm3 Final     IMMATURE GRANS, ABSOLUTE   Date Value Ref Range Status   03/02/2017 0.04 (H) 0.00 - 0.02 10*3/mm3 Final     GLUCOSE   Date Value Ref Range Status   03/02/2017 82 60 - 100 mg/dL Final     SODIUM   Date Value Ref Range Status   03/02/2017 135 (L) 137 - 145 mmol/L Final     POTASSIUM   Date Value Ref Range Status   03/02/2017 4.0 3.5 - 5.1 mmol/L Final     CO2   Date Value Ref Range Status   03/02/2017 24.0 22.0 - 31.0 mmol/L Final     CHLORIDE   Date Value Ref Range Status   03/02/2017 103 95 - 110 mmol/L Final     ANION GAP   Date Value Ref Range Status   03/02/2017 8.0 5.0 - 15.0 mmol/L Final     CREATININE   Date Value Ref Range Status   03/02/2017 0.64 0.50 - 1.00 mg/dL Final     BUN   Date Value Ref Range Status   03/02/2017 9 7 - 21 mg/dL Final     BUN/CREATININE RATIO   Date Value Ref Range Status   03/02/2017 14.1 7.0 - 25.0 Final     CALCIUM   Date Value Ref Range Status   03/02/2017 8.4 8.4 - 10.2 mg/dL Final     EGFR NON  AMER   Date Value Ref Range Status   03/02/2017 90 39 - 90 mL/min/1.73 Final     ALKALINE  PHOSPHATASE   Date Value Ref Range Status   03/02/2017 66 38 - 126 U/L Final     TOTAL PROTEIN   Date Value Ref Range Status   03/02/2017 4.9 (L) 6.3 - 8.6 g/dL Final     ALT (SGPT)   Date Value Ref Range Status   03/02/2017 28 9 - 52 U/L Final     AST (SGOT)   Date Value Ref Range Status   03/02/2017 15 14 - 36 U/L Final     TOTAL BILIRUBIN   Date Value Ref Range Status   03/02/2017 0.3 0.2 - 1.3 mg/dL Final     ALBUMIN   Date Value Ref Range Status   03/02/2017 2.90 (L) 3.40 - 4.80 g/dL Final     GLOBULIN   Date Value Ref Range Status   03/02/2017 2.0 (L) 2.3 - 3.5 gm/dL Final     A/G RATIO   Date Value Ref Range Status   03/02/2017 1.5 1.1 - 1.8 g/dL Final     Lab Results   Component Value Date    IRON 16 (L) 03/02/2017    TIBC 249 (L) 03/02/2017    LABIRON 6 (L) 03/02/2017    FERRITIN 51.20 03/02/2017    UIEGARCE12 341 03/02/2017    FOLATE 9.95 03/02/2017     Lab Results   Component Value Date    CEA 11.7 (H) 01/17/2017    REFLABREPO SEE NOTE: 02/10/2016     RADIOLOGY DATA:  CT of chest abdomen and pelvis with contrast done on March 1, 2017 shows:  ABDOMEN: There has been significant worsening of subcutaneous,  peritoneal and mesenteric peripherally enhancing centrally  necrotic metastatic masses. For example large subcutaneous mass  in the midline upper abdominal wall subcutaneous tissues now  measures up to 5.8 x 5.5 cm on image 95 where this previously  measured 3.1 x 2.6 cm. Large mass along the rectus musculature  and anterior peritoneum on image 133 measures 4.5 x 3.7 cm and  previously measured 1.8 x 1.5 cm. Large mesenteric mass in the  midabdomen on image 149 measures 3.2 x 2.8 cm and previously  measured 1.4 x 1.2 cm. The patient is status post a right  hemicolectomy. Adjacent to the hemicolectomy site is a large  lobulated mass consistent with local recurrence which also has  increased in size and involves adjacent small bowel. On image 118  this measures up to approximately 5.0 x 4.9 cm. There is  an  additional large mass along the greater curvature of the stomach  just inferior to the antrum seen best on coronal image 30 that  also has significantly increased in size. Peritoneal implant  along the posterior edge of the spleen has also increased in  size. The patient is status post cholecystectomy. Small right  renal cyst is noted. Solid abdominal organs are otherwise  unremarkable. Vascular calcifications are noted. There is no  retroperitoneal adenopathy. There is no free fluid or free air  within the abdomen.     Pelvis: The patient is status post hysterectomy. There has been  increase in size of left-sided retroperitoneal mass anterior to  the iliacus muscle on image 152 now measuring 2.1 x 1.7 cm and  previously measured 1.3 x 0.9 cm. Additional peritoneal implants  are noted in the pelvis with one adjacent to the sigmoid colon on  image 167. There is diverticulosis. There is no free fluid in the  pelvis. There is no pelvic adenopathy. No bony metastatic lesion  is noted.     IMPRESSION:  CONCLUSION:   1. Significant worsening metastatic disease in the abdomen and  pelvis with subcutaneous, mesenteric and peritoneal masses all  having increased in size. Local recurrence adjacent to the right  hemicolectomy site also has increased in size.  2. No acute abnormality in the chest.    ASSESSMENT AND PLAN:      1. Recurrent metastatic colon cancer. Patient initially was diagnosed with a stage III disease T4 N2 M0 in February 2016. Patient initially had a hemicolectomy followed by adjuvant FOLFOX 6 for 8 cycles last of which was in August 2016. Chemotherapy was held at that point due to worsening performance status side effects as well as recurrent hospital admission. In October 2016 she had a recurrence at the site of anastomosis for which Dr. Quesada did total colectomy. After that patient was seen at [place] for a second opinion, due to PET scan being negative for any metastatic disease except for the  site of recurrence the recommended close monitoring for recurrence. In December 2016 around [place] she started feeling a lump around anterior abdominal wall next to the surgical incision for which Dr. Quesada did a CAT scan of chest abdomen and pelvis on January 12, 2017, it showed there was a recurrence and metastatic disease involving anterior abdominal wall mesentery as well as spleen and pelvic sidewall. For which patient was again seen by Dr. Peck at [place] and recommendation was to start her on Opdivo due to tumor having MSI high status.  Patient has so far gotten 3 cycles of Opdivo last of which was on February 24, 2017.  Her performance status is deteriorating significantly.  CT scan done yesterday on March 1, 2017 shows significant worsening of metastatic disease in the abdomen and pelvis.  At this point result of CAT scan as well as treatment recommendation were discussed with patient and her  as well as daughters at bedside.  Treatment option at this point includes conventional chemotherapy like FOLFIRI versus palliative radiation therapy versus palliative/hospice care.  Patient wants to go with the hospice/palliative care at this point.  We will put in a consult for hospice.    2.  Abdominal pain.  Patient is complaining of increasing abdominal pain.  We will start her on fentanyl 12 µg patch.  Continue with when necessary morphine and oxycodone for now.    3.  Anemia: Anemia workup done today shows anemia consistent with iron deficiency.  Saturation is only 6% and ferritin is only 51 despite of this much progression of the cancer.  In view of patient's severe fatigue we will start her on intravenous iron.  Recommend transfusing when necessary if hemoglobin is less than 7 at this point.    4.  Hypothyroidism: Patient's TSH has gone down from 31-19 after increasing the dose of levothyroxine by Dr. Mota.  Recommend continuing with same dose for now.              Irving Booth,  MD  3/2/2017  5:42 PM

## 2017-03-02 NOTE — PROGRESS NOTES
Acute Care - Physical Therapy Initial Evaluation  [facility]     Patient Name: Bernadette Camarena  : 1940  MRN: [id number]  Today's Date: 3/2/2017      Date of Referral to PT: 17  Referring Physician: Dr ROSEMARIE Delarosa      Admit Date: 2017     Visit Dx:    ICD-10-CM ICD-9-CM   1. Lower GI bleed K92.2 578.9   2. Hypokalemia E87.6 276.8   3. General weakness R53.1 780.79   4. Non-intractable vomiting with nausea, unspecified vomiting type R11.2 787.01     Patient Active Problem List   Diagnosis   • Hypertension   • Osteoporosis   • History of diverticulitis   • History of malignant neoplasm of colon   • Myalgia   • Dysphagia   • Hypothyroidism   • Hyperlipidemia   • Malignant neoplasm of hepatic flexure   • Abdominal wall mass   • Metastatic colon cancer in female   • Nausea with vomiting   • Lower GI bleed     Past Medical History   Diagnosis Date   • Acute sinusitis    • Anemia    • Anxiety    • Artificial lens present    • Cystitis    • Disease of thyroid gland    • Diverticular disease    • Diverticulitis of colon    • Dysphagia    • Dysuria    • GERD (gastroesophageal reflux disease)      With dysphagia   • Heart murmur    • History of malignant neoplasm of colon      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 Right colon cancer   • Endoscopy and colonoscopy  2015     Internal & external hemorrhoids found. Diverticulum in  sigmoid colon & descending colon   • Colonoscopy  02/10/2016     No specimens collected. Mild diverticulosis in the sigmoid colon.Malignant appearing tumor in the colon. Biopsied   • Esophagoscopy / egd  02/10/2016     With biopsy-Benign appearing esophageal stenosis.Chronic gastritis.Biopsied.Normal examined duodenum   • Esophagoscopy / egd  02/12/2015     with tube-Esophageal stricture present. Dilatation performed. Gastritis in stomach. Biopsy taken. Normal duodenum   • Venous access device (port) insertion  03/10/2016     Ultrasound guided right Mediport placement under fluoroscopy   • Cataract extraction  10/15/2015     Cataract extraction with intraocular lens implantation, left eye          PT ASSESSMENT (last 72 hours)      PT Evaluation       03/02/17 0902 03/01/17 1510    Rehab Evaluation    Document Type evaluation  -AW     Subjective Information no complaints;agree to therapy  -AW     General Information    Patient Profile Review yes  -AW     Referring Physician Dr ROSEMARIE Delarosa  -AW     Pertinent History Of Current Problem --   Pt had became weak and reports inability to walk to kitchen.  -AW     Prior Level of Function independent:;gait;transfer  -AW     Equipment Currently Used at Home none  -AW walker, rolling;shower chair  -EW    Plans/Goals Discussed With patient  -AW     Benefits Reviewed patient:;improve function;increase independence;increase strength  -AW     Barriers to Rehab medically complex  -AW     Living Environment    Lives With significant other  -AW friend(s)  -EW    Living Arrangements house  -AW house  -EW    Home Accessibility stairs to enter home  -AW     Number of Stairs to Enter Home 3  -AW     Stair Railings at Home none  -AW     Transportation Available  family or friend will provide  -EW    Clinical Impression    Date of Referral to PT 03/01/17  -AW     PT Diagnosis generalized weakness  -AW     Criteria for Skilled Therapeutic Interventions Met yes;treatment indicated  -AW      Impairments Found (describe specific impairments) gait, locomotion, and balance  -AW     Rehab Potential good, to achieve stated therapy goals  -AW     Predicted Duration of Therapy Intervention (days/wks) till d/c  -AW     Vital Signs    Pre Systolic BP Rehab 125  -AW     Pre Treatment Diastolic BP 49  -AW     Post Systolic BP Rehab 132  -AW     Post Treatment Diastolic BP 54  -AW     Pretreatment Heart Rate (beats/min) 75  -AW     Posttreatment Heart Rate (beats/min) 89  -AW     Pre SpO2 (%) 92  -AW     O2 Delivery Pre Treatment room air  -AW     Post SpO2 (%) 96  -AW     Pain Assessment    Pain Assessment 0-10  -AW     Pain Score 0  -AW     Post Pain Score 2  -AW     Pain Type Acute pain  -AW     Pain Location Abdomen  -AW     Cognitive Assessment/Intervention    Current Cognitive/Communication Assessment functional  -AW     Orientation Status oriented x 4  -AW     Follows Commands/Answers Questions 100% of the time  -AW     ROM (Range of Motion)    General ROM no range of motion deficits identified  -AW     MMT (Manual Muscle Testing)    General MMT Assessment no strength deficits identified  -AW     Bed Mobility, Assessment/Treatment    Bed Mob, Supine to Sit, [place] supervision required  -AW     Bed Mob, Sit to Supine, [place] not tested  -AW     Transfer Assessment/Treatment    Transfers, Sit-Stand [place] stand by assist  -AW     Transfers, Stand-Sit [place] stand by assist  -AW     Transfers, Sit-Stand-Sit, Assist Device rolling walker  -AW     Gait Assessment/Treatment    Gait, [place] Level stand by assist  -AW     Gait, Assistive Device rolling walker  -AW     Gait, Distance (Feet) 350  -AW     Gait, Gait Deviations step length decreased  -AW     Gait, Safety Issues step length decreased  -AW     Gait, Impairments other (see comments)  -AW     Gait, Comment ex tolerance  -AW     Sensory Assessment/Intervention    Sensory Impairment dull  -AW     Light Touch LLE;RLE  -AW      Positioning and Restraints    Pre-Treatment Position in bed  -AW     Post Treatment Position chair  -AW     In Chair with family/caregiver;encouraged to call for assist  -AW       02/28/17 2100       General Information    Equipment Currently Used at Home none  -TC     Living Environment    Lives With significant other  -TC     Living Arrangements house  -TC     Home Accessibility no concerns  -TC     Stair Railings at Home none  -TC     Type of Financial/Environmental Concern none  -TC     Transportation Available car  -TC       User Key  (r) = Recorded By, (t) = Taken By, (c) = Cosigned By    Initials Name Provider Type    AW Zee Owusu, PT Physical Therapist    TC Chyna Berger Jr., RN Registered Nurse    JAVON Pat               PT Recommendation and Plan  Anticipated Equipment Needs At Discharge: front wheeled walker  Anticipated Discharge Disposition: home with assist  Planned Therapy Interventions: gait training, home exercise program, stair training, transfer training  PT Frequency: 5 times/wk  Plan of Care Review  Plan Of Care Reviewed With: patient  Outcome Summary/Follow up Plan: Pt presents with decreased ex tolerance, transfer and gait. Pt needs PT for gait training with RW.           IP PT Goals       03/02/17 1034 03/02/17 1032       Transfer Training 2 PT LTG    Transfer Training PT 2 LTG, Date Established 03/02/17  -AW (P)  03/02/17  -AW     Transfer Training PT 2 LTG, Time to Achieve by discharge  -AW (P)  by discharge  -AW     Transfer Training PT 2 LTG, Activity Type sit to stand/stand to sit  -AW (P)  sit to stand/stand to sit  -AW     Transfer Training PT 2 LTG, [place] Level conditional independence  -AW (P)  conditional independence  -AW     Transfer Training PT 2 LTG, Assist Device walker, rolling  -AW (P)  walker, rolling  -AW     Transfer Training PT 2 LTG, Outcome goal revised  -AW      Gait Training PT LTG    Gait Training Goal PT LTG, Date  Established 03/02/17  -AW      Gait Training Goal PT LTG, Time to Achieve by discharge  -AW      Gait Training Goal PT LTG, [place] Level conditional independence  -AW      Gait Training Goal PT LTG, Assist Device walker, rolling  -AW      Gait Training Goal PT LTG, Distance to Achieve 300  -AW      Stair Training PT LTG    Stair Training Goal PT LTG, Date Established 03/02/17  -AW (P)  03/02/17  -AW     Stair Training Goal PT LTG, Time to Achieve by discharge  -AW (P)  by discharge  -AW     Stair Training Goal PT LTG, Number of Steps 3  -AW (P)  3  -AW     Stair Training Goal PT LTG, [place] Level supervision required  -AW (P)  supervision required  -AW     Stair Training Goal PT LTG, Outcome goal revised  -AW        User Key  (r) = Recorded By, (t) = Taken By, (c) = Cosigned By    Initials Name Provider Type    BRYCE Owusu, PT Physical Therapist                Outcome Measures       03/02/17 0902          How much help from another person do you currently need...    Turning from your back to your side while in flat bed without using bedrails? 4  -AW      Moving from lying on back to sitting on the side of a flat bed without bedrails? 3  -AW      Moving to and from a bed to a chair (including a wheelchair)? 3  -AW      Standing up from a chair using your arms (e.g., wheelchair, bedside chair)? 3  -AW      Climbing 3-5 steps with a railing? 3  -AW      To walk in hospital room? 3  -AW      AM-PAC 6 Clicks Score 19  -AW      Functional Assessment    Outcome Measure Options AM-PAC 6 Clicks Basic Mobility (PT)  -AW        User Key  (r) = Recorded By, (t) = Taken By, (c) = Cosigned By    Initials Name Provider Type    BRYCE Owusu, PT Physical Therapist           Time Calculation:         PT Charges       03/02/17 1040          Time Calculation    Start Time 0902  -AW      Stop Time 0930  -AW      Time Calculation (min) 28 min  -AW      PT Received On 03/02/17  -AW      PT Goal Re-Cert Due Date  03/15/17  -        User Key  (r) = Recorded By, (t) = Taken By, (c) = Cosigned By    Initials Name Provider Type    AW Zee Owusu, PT Physical Therapist          Therapy Charges for Today     Code Description Service Date Service Provider Modifiers Qty    57542522761 HC PT MOBILITY CURRENT 3/2/2017 Zee Owusu, PT GP, CJ 1    17094046349 HC PT MOBILITY PROJECTED 3/2/2017 Zee Owusu, PT GP, CI 1    16593725322 HC PT EVAL MOD COMPLEXITY 2 3/2/2017 Zee Owusu, PT GP 1          PT G-Codes  Outcome Measure Options: AM-PAC 6 Clicks Basic Mobility (PT)  Score: 19  Functional Limitation: Mobility: Walking and moving around  Mobility: Walking and Moving Around Current Status (): At least 20 percent but less than 40 percent impaired, limited or restricted  Mobility: Walking and Moving Around Goal Status (): At least 1 percent but less than 20 percent impaired, limited or restricted      Zee Owusu, PT  3/2/2017

## 2017-03-02 NOTE — PLAN OF CARE
Problem: Patient Care Overview (Adult)  Goal: Plan of Care Review  Outcome: Ongoing (interventions implemented as appropriate)    03/02/17 0244   Coping/Psychosocial Response Interventions   Plan Of Care Reviewed With patient   Patient Care Overview   Progress no change   Outcome Evaluation   Outcome Summary/Follow up Plan Patient continues to complain of abdominal pain. Pain medication administered as ordered with relief. Labs monitored. Patient complains of loose stool. IVF infusing. Patient on telemetry, no calls thus far in shift. Vital signs stable. CT results pending. Will continue to monitor patient.        Goal: Adult Individualization and Mutuality  Outcome: Ongoing (interventions implemented as appropriate)  Goal: Discharge Needs Assessment  Outcome: Ongoing (interventions implemented as appropriate)    Problem: Gastrointestinal Bleeding (Adult)  Goal: Signs and Symptoms of Listed Potential Problems Will be Absent or Manageable (Gastrointestinal Bleeding)  Outcome: Ongoing (interventions implemented as appropriate)    Problem: Pain, Acute (Adult)  Goal: Acceptable Pain Control/Comfort Level  Outcome: Ongoing (interventions implemented as appropriate)

## 2017-03-02 NOTE — PROGRESS NOTES
hospitalist  Active Problems:    Lower GI bleed        Subjective: Mild right lower abdominal pain.  Eating good but lot of gas.  No nausea or vomiting.  Had bowel movement which was lighter than when she came in.  No dizziness.  Continued generalized weakness but able to get up on her own.  Also walked with physical therapy..  No chest pain or shortness of breath.  No other new complaints.  one family member is present at bedside.  Spoke with nursing staff.  Oncology consult and help appreciated.      Review of Systems:    Review of Systems   Constitutional: Negative for chills and fever.   All other systems reviewed and are negative.      Objective     Vital Signs:  Temp:  [96.9 °F (36.1 °C)-97.5 °F (36.4 °C)] 97.1 °F (36.2 °C)  Heart Rate:  [66-78] 67  Resp:  [18] 18  BP: (118-167)/(56-70) 131/59    Physical Exam:   Physical Exam   Constitutional: She is oriented to person, place, and time. She appears well-developed and well-nourished. No distress.   HENT:   Mouth/Throat: Oropharynx is clear and moist.   Eyes: Conjunctivae are normal. No scleral icterus.   Neck: Neck supple. No JVD present. No thyromegaly present.   Cardiovascular: Normal rate, regular rhythm and normal heart sounds.    Pulmonary/Chest: Effort normal and breath sounds normal. No respiratory distress.   Abdominal: Soft. She exhibits no distension. There is tenderness in the right lower quadrant. There is no rebound and no guarding.       Musculoskeletal: Normal range of motion. She exhibits no edema.   Neurological: She is alert and oriented to person, place, and time. No cranial nerve deficit.   Skin: Skin is warm and dry. No rash noted. She is not diaphoretic. No pallor.   Psychiatric: She has a normal mood and affect. Her behavior is normal.          Results Review:       Lab Results (last 24 hours)     Procedure Component Value Units Date/Time    Blood Culture [[id number]]  (Normal) Collected:  02/28/17 1827    Specimen:  Blood from Arm, Left  Updated:  03/01/17 1901     Blood Culture No growth at 24 hours     Blood Culture [[id number]]  (Normal) Collected:  02/28/17 1827    Specimen:  Blood from Arm, Left Updated:  03/01/17 1901     Blood Culture No growth at 24 hours     Hemoglobin & Hematocrit, Blood [[id number]]  (Abnormal) Collected:  03/01/17 1956    Specimen:  Blood Updated:  03/01/17 2050     Hemoglobin 8.7 (L) g/dL      Hematocrit 26.0 (L) %     Urine Culture [[id number]] Collected:  03/01/17 0455    Specimen:  Urine from Urine, Clean Catch Updated:  03/02/17 0600     Urine Culture Mixed Culture     Narrative:         Specimen contains mixed organisms of questionable pathogenicity which indicates contamination with commensal caitlyn.  Further identification is unlikely to provide clinically useful information.  Suggest recollection.    CBC & Differential [[id number]] Collected:  03/02/17 0635    Specimen:  Blood Updated:  03/02/17 0721    Narrative:       The following orders were created for panel order CBC & Differential.  Procedure                               Abnormality         Status                     ---------                               -----------         ------                     CBC Auto Differential[[id number]]         Abnormal            Final result                 Please view results for these tests on the individual orders.    CBC Auto Differential [[id number]]  (Abnormal) Collected:  03/02/17 0635    Specimen:  Blood Updated:  03/02/17 0721     WBC 6.66 10*3/mm3      RBC 3.07 (L) 10*6/mm3      Hemoglobin 8.7 (L) g/dL      Hematocrit 25.4 (L) %      MCV 82.7 fL      MCH 28.3 pg      MCHC 34.3 g/dL      RDW 15.1 (H) %      RDW-SD 45.2 fl      MPV 9.0 fL      Platelets 207 10*3/mm3      Neutrophil % 66.2 %      Lymphocyte % 17.0 %      Monocyte % 11.9 %      Eosinophil % 3.8 %      Basophil % 0.5 %      Immature Grans % 0.6 (H) %      Neutrophils, Absolute 4.42 10*3/mm3      Lymphocytes, Absolute 1.13 10*3/mm3      Monocytes, Absolute 0.79  10*3/mm3      Eosinophils, Absolute 0.25 10*3/mm3      Basophils, Absolute 0.03 10*3/mm3      Immature Grans, Absolute 0.04 (H) 10*3/mm3     Comprehensive Metabolic Panel [[id number]]  (Abnormal) Collected:  03/02/17 0635    Specimen:  Blood Updated:  03/02/17 0800     Glucose 82 mg/dL      BUN 9 mg/dL      Creatinine 0.64 mg/dL      Sodium 135 (L) mmol/L      Potassium 4.0 mmol/L      Chloride 103 mmol/L      CO2 24.0 mmol/L      Calcium 8.4 mg/dL      Total Protein 4.9 (L) g/dL      Albumin 2.90 (L) g/dL      ALT (SGPT) 28 U/L      AST (SGOT) 15 U/L      Alkaline Phosphatase 66 U/L      Total Bilirubin 0.3 mg/dL      eGFR Non African Amer 90 mL/min/1.73      Globulin 2.0 (L) gm/dL      A/G Ratio 1.5 g/dL      BUN/Creatinine Ratio 14.1      Anion Gap 8.0 mmol/L     Narrative:       The MDRD GFR formula is only valid for adults with stable renal function between ages 18 and 70.    Iron Profile [[id number]]  (Abnormal) Collected:  03/02/17 0635    Specimen:  Blood Updated:  03/02/17 0833     Iron 16 (L) mcg/dL      TIBC 249 (L) mcg/dL      Iron Saturation 6 (L) %     Ferritin [[id number]]  (Normal) Collected:  03/02/17 0635    Specimen:  Blood Updated:  03/02/17 0858     Ferritin 51.20 ng/mL     Folate [[id number]]  (Normal) Collected:  03/02/17 0635    Specimen:  Blood Updated:  03/02/17 0928     Folate 9.95 ng/mL     Vitamin B12 [[id number]]  (Normal) Collected:  03/02/17 0635    Specimen:  Blood Updated:  03/02/17 0928     Vitamin B-12 341 pg/mL         Imaging Results (last 24 hours)     Procedure Component Value Units Date/Time    XR Chest 1 View [[id number]] Collected:  02/28/17 1906     Updated:  02/28/17 1910    Narrative:       Patient Name:  GRETCHEN LYON  Patient ID:  [id number]   Ordering:  KAREN HOLLIDAY  Attending:  KAREN HOLLIDAY  Referring:  KAREN HOLLIDAY  ------------------------------------------------  PROCEDURE: XR CHEST 1 VIEW    INDICATION FOR PROCEDURE:  77 years -old patient presents for  evaluation of a  simple sepsis protocol.    COMPARISON:  Chest radiograph dated August 3, 2015    FINDINGS: XR CHEST 1 view  reveals the lungs are expanded.  There  is mild elevation of the right hemidiaphragm. Patient has  right-sided Mediport catheter with the tip of the catheter in the  superior vena cava. Cardiac monitoring leads are present.    There is atherosclerotic calcification of the thoracic aorta.  Thoracic aorta is mildly tortuous. There is no radiographic  evidence for airspace consolidation, pleural effusion or  pneumothorax. Mediastinal and cardiac silhouettes are within  normal limits.     The skeletal structures are within normal limits.       Impression:       No radiographic evidence of acute cardiopulmonary  disease.    Electronically signed by:  Rashmi Longoria MD  2/28/2017 7:09 PM CST  Workstation: [employer]            Medication Review: medications have been reviewed    Assessment/Plan:  1. Colon cancer with metastases:   -Consulted Dr. Booth  -CT scan of chest and abdomen ordered, result pending  -Treatment plan as per Dr. Booth   2.  Hypothyroidism: Patient's TSH is 19 as of last checked.  Now it is 28 .This may be contributing to her generalized weakness.   -Increased dose of levothyroxine  3. Chronic anemia: Secondary to chronic rectal bleed.  -Anemia little worse, but stable   -When necessary GI consultation if needed, we will decide after Dr. Booth's input.  4.  Generalized weakness with functional decline  -Consult case management for discharge planning, probably home with home health  5 hypokalemia  -Replace potassium  6 hyponatremia, mild  -Monitor  DVT prophylaxis: SCD, no anticoagulation due to GI bleeding            Kelly Delarosa MD  03/02/17  9:55 AM        Imaging Results (last 24 hours)     Procedure Component Value Units Date/Time    CT Chest With Contrast [[id number]] Collected:  03/02/17 1012     Updated:  03/02/17 1026    Narrative:         [facility]    Patient  Name: GRETCHEN LYON    ORDERING: HEIDI ALVAREZ    ATTENDING: DARRYN GOMEZ     REFERRING: HEIDI ALVAERZ  -----------------------    PROCEDURE: CT chest, abdomen and pelvis with contrast on 3/1/2017    CLINICAL INDICATION:  Shortness of breath, rectal bleeding,  generalized abdominal pain, history of metastatic colon cancer    TECHNIQUE: Multiple axial images are obtained throughout the  chest, abdomen and pelvis following the administration of IV and  oral contrast.   Total DLP is 744.6 mGy*cm.    COMPARISON: 1/12/2017     FINDINGS:     CHEST: Coronary artery calcifications and other vascular  calcifications are noted. There is evidence of calcified  granulomatous disease in the chest. There are mild areas of  linear atelectasis or scarring bilaterally. The lungs are  otherwise clear. There is no pleural or pericardial effusion.  There is no thoracic adenopathy. No acute bony abnormality of the  thorax is noted.    ABDOMEN: There has been significant worsening of subcutaneous,  peritoneal and mesenteric peripherally enhancing centrally  necrotic metastatic masses. For example large subcutaneous mass  in the midline upper abdominal wall subcutaneous tissues now  measures up to 5.8 x 5.5 cm on image 95 where this previously  measured 3.1 x 2.6 cm. Large mass along the rectus musculature  and anterior peritoneum on image 133 measures 4.5 x 3.7 cm and  previously measured 1.8 x 1.5 cm. Large mesenteric mass in the  midabdomen on image 149 measures 3.2 x 2.8 cm and previously  measured 1.4 x 1.2 cm. The patient is status post a right  hemicolectomy. Adjacent to the hemicolectomy site is a large  lobulated mass consistent with local recurrence which also has  increased in size and involves adjacent small bowel. On image 118  this measures up to approximately 5.0 x 4.9 cm. There is an  additional large mass along the greater curvature of the stomach  just inferior to the antrum seen best on coronal image 30 that  also has  significantly increased in size. Peritoneal implant  along the posterior edge of the spleen has also increased in  size. The patient is status post cholecystectomy. Small right  renal cyst is noted. Solid abdominal organs are otherwise  unremarkable. Vascular calcifications are noted. There is no  retroperitoneal adenopathy. There is no free fluid or free air  within the abdomen.    Pelvis: The patient is status post hysterectomy. There has been  increase in size of left-sided retroperitoneal mass anterior to  the iliacus muscle on image 152 now measuring 2.1 x 1.7 cm and  previously measured 1.3 x 0.9 cm. Additional peritoneal implants  are noted in the pelvis with one adjacent to the sigmoid colon on  image 167. There is diverticulosis. There is no free fluid in the  pelvis. There is no pelvic adenopathy. No bony metastatic lesion  is noted.      Impression:       CONCLUSION:   1. Significant worsening metastatic disease in the abdomen and  pelvis with subcutaneous, mesenteric and peritoneal masses all  having increased in size. Local recurrence adjacent to the right  hemicolectomy site also has increased in size.  2. No acute abnormality in the chest.    Electronically signed by:  Oleg Valladares  3/2/2017 10:25 AM  CST Workstation: [id number]    CT Abdomen Pelvis With Contrast [[id number]] Collected:  03/02/17 1012     Updated:  03/02/17 1026    Narrative:         [facility]    Patient Name: GRETCHEN LYON    ORDERING: HEIDI ALVAREZ    ATTENDING: DARRYN GOMEZ     REFERRING: HEIDI ALVAREZ  -----------------------    PROCEDURE: CT chest, abdomen and pelvis with contrast on 3/1/2017    CLINICAL INDICATION:  Shortness of breath, rectal bleeding,  generalized abdominal pain, history of metastatic colon cancer    TECHNIQUE: Multiple axial images are obtained throughout the  chest, abdomen and pelvis following the administration of IV and  oral contrast.   Total DLP is 744.6  mGy*cm.    COMPARISON: 1/12/2017     FINDINGS:     CHEST: Coronary artery calcifications and other vascular  calcifications are noted. There is evidence of calcified  granulomatous disease in the chest. There are mild areas of  linear atelectasis or scarring bilaterally. The lungs are  otherwise clear. There is no pleural or pericardial effusion.  There is no thoracic adenopathy. No acute bony abnormality of the  thorax is noted.    ABDOMEN: There has been significant worsening of subcutaneous,  peritoneal and mesenteric peripherally enhancing centrally  necrotic metastatic masses. For example large subcutaneous mass  in the midline upper abdominal wall subcutaneous tissues now  measures up to 5.8 x 5.5 cm on image 95 where this previously  measured 3.1 x 2.6 cm. Large mass along the rectus musculature  and anterior peritoneum on image 133 measures 4.5 x 3.7 cm and  previously measured 1.8 x 1.5 cm. Large mesenteric mass in the  midabdomen on image 149 measures 3.2 x 2.8 cm and previously  measured 1.4 x 1.2 cm. The patient is status post a right  hemicolectomy. Adjacent to the hemicolectomy site is a large  lobulated mass consistent with local recurrence which also has  increased in size and involves adjacent small bowel. On image 118  this measures up to approximately 5.0 x 4.9 cm. There is an  additional large mass along the greater curvature of the stomach  just inferior to the antrum seen best on coronal image 30 that  also has significantly increased in size. Peritoneal implant  along the posterior edge of the spleen has also increased in  size. The patient is status post cholecystectomy. Small right  renal cyst is noted. Solid abdominal organs are otherwise  unremarkable. Vascular calcifications are noted. There is no  retroperitoneal adenopathy. There is no free fluid or free air  within the abdomen.    Pelvis: The patient is status post hysterectomy. There has been  increase in size of left-sided  retroperitoneal mass anterior to  the iliacus muscle on image 152 now measuring 2.1 x 1.7 cm and  previously measured 1.3 x 0.9 cm. Additional peritoneal implants  are noted in the pelvis with one adjacent to the sigmoid colon on  image 167. There is diverticulosis. There is no free fluid in the  pelvis. There is no pelvic adenopathy. No bony metastatic lesion  is noted.      Impression:       CONCLUSION:   1. Significant worsening metastatic disease in the abdomen and  pelvis with subcutaneous, mesenteric and peritoneal masses all  having increased in size. Local recurrence adjacent to the right  hemicolectomy site also has increased in size.  2. No acute abnormality in the chest.    Electronically signed by:  Oleg Valladares  3/2/2017 10:25 AM  CST Workstation: [id number]        Patient was just seen by Dr. Booth and I spoke with Dr. Booth and due to worsening of 4 overall cancer with very poor prognosis patient has decided to consult hospice services and comfort care.  She referral would be sent to hospice services.  Discharge planning according to family's decision which include hospice with home or hospice with nursing home placement.

## 2017-03-02 NOTE — PROGRESS NOTES
Acute Care - Physical Therapy Treatment Note  [facility]     Patient Name: Bernadette Camarena  : 1940  MRN: [id number]  Today's Date: 3/2/2017     Date of Referral to PT: 17  Referring Physician: REN Delarosa    Admit Date: 2017    Visit Dx:    ICD-10-CM ICD-9-CM   1. Lower GI bleed K92.2 578.9   2. Hypokalemia E87.6 276.8   3. General weakness R53.1 780.79   4. Non-intractable vomiting with nausea, unspecified vomiting type R11.2 787.01   5. Impaired mobility and activities of daily living Z74.09 799.89     Patient Active Problem List   Diagnosis   • Hypertension   • Osteoporosis   • History of diverticulitis   • History of malignant neoplasm of colon   • Myalgia   • Dysphagia   • Hypothyroidism   • Hyperlipidemia   • Malignant neoplasm of hepatic flexure   • Abdominal wall mass   • Metastatic colon cancer in female   • Nausea with vomiting   • Lower GI bleed               Adult Rehabilitation Note       17 1322          Rehab Assessment/Intervention    Discipline physical therapist  -AW      Document Type therapy note (daily note)  -AW      Subjective Information agree to therapy  -AW      Patient Effort, Rehab Treatment good  -AW      Precautions/Limitations fall precautions  -AW      Patient Response to Treatment --   reports knee weakness  -AW      Recorded by [AW] Zee Owusu, PT      Vital Signs    Pre SpO2 (%) 96  -AW      Intra SpO2 (%) 94  -AW      Post SpO2 (%) 97  -AW      Recorded by [AW] Zee Owusu, PT      Pain Assessment    Pain Assessment No/denies pain  -AW      Recorded by [AW] Zee Owusu, PT      Bed Mobility, Assessment/Treatment    Bed Mob, Supine to Sit, [place] supervision required  -AW      Recorded by [AW] Zee Owusu, PT      Transfer Assessment/Treatment    Transfers, Sit-Stand [place] supervision required  -AW      Transfers, Stand-Sit [place] supervision required  -AW      Transfers, Sit-Stand-Sit, Assist Device rolling walker  -AW       Recorded by [AW] Zee Owusu, PT      Gait Assessment/Treatment    Gait, [place] Level stand by assist  -AW      Gait, Assistive Device rolling walker  -AW      Gait, Distance (Feet) 250   40  -AW      Recorded by [AW] Zee Owusu, PT      Stairs Assessment/Treatment    Number of Stairs 2  -AW      Stairs, Handrail Location both sides  -AW      Stairs, [place] Level contact guard assist  -AW      Stairs, Technique Used step over step (descending);step over step (ascending)  -AW      Recorded by [AW] Zee Owusu, PT      Positioning and Restraints    Pre-Treatment Position in bed  -AW      Post Treatment Position chair  -AW      In Chair with family/caregiver  -AW      Recorded by [AW] Zee Owusu, PT        User Key  (r) = Recorded By, (t) = Taken By, (c) = Cosigned By    Initials Name Effective Dates    BRYCE Owusu, PT 10/17/16 -                 IP PT Goals       03/02/17 1423 03/02/17 1034 03/02/17 1032    Transfer Training 2 PT LTG    Transfer Training PT 2 LTG, Date Established  03/02/17  -AW (P)  03/02/17  -AW    Transfer Training PT 2 LTG, Time to Achieve  by discharge  -AW (P)  by discharge  -AW    Transfer Training PT 2 LTG, Activity Type  sit to stand/stand to sit  -AW (P)  sit to stand/stand to sit  -AW    Transfer Training PT 2 LTG, [place] Level  conditional independence  -AW (P)  conditional independence  -AW    Transfer Training PT 2 LTG, Assist Device  walker, rolling  -AW (P)  walker, rolling  -AW    Transfer Training PT 2 LTG, Date Goal Reviewed 03/02/17  -AW      Transfer Training PT 2 LTG, Outcome goal ongoing  -AW goal revised  -AW     Gait Training PT LTG    Gait Training Goal PT LTG, Date Established  03/02/17  -AW     Gait Training Goal PT LTG, Time to Achieve  by discharge  -AW     Gait Training Goal PT LTG, [place] Level  conditional independence  -AW     Gait Training Goal PT LTG, Assist Device  walker, rolling  -AW     Gait Training Goal PT LTG,  Distance to Achieve  300  -AW     Gait Training Goal PT LTG, Date Goal Reviewed 03/02/17  -AW      Gait Training Goal PT LTG, Outcome goal ongoing  -AW      Stair Training PT LTG    Stair Training Goal PT LTG, Date Established  03/02/17  -AW (P)  03/02/17  -AW    Stair Training Goal PT LTG, Time to Achieve  by discharge  -AW (P)  by discharge  -AW    Stair Training Goal PT LTG, Number of Steps  3  -AW (P)  3  -AW    Stair Training Goal PT LTG, [place] Level  supervision required  -AW (P)  supervision required  -AW    Stair Training Goal PT LTG, Date Goal Reviewed 03/02/17  -AW      Stair Training Goal PT LTG, Outcome goal ongoing  -AW goal revised  -AW       User Key  (r) = Recorded By, (t) = Taken By, (c) = Cosigned By    Initials Name Provider Type    AW Zee Owusu, PT Physical Therapist          Physical Therapy Education     Title: PT OT SLP Therapies (Active)     Topic: Physical Therapy (Active)     Point: Mobility training (Done)    Learning Progress Summary    Learner Readiness Method Response Comment Documented by Status   Patient Acceptance E DU,NR Pt needs vc for hand placement with transfer.  03/02/17 1427 Done                      User Key     Initials Effective Dates Name Provider Type Discipline     10/17/16 -  eZe Owusu, PT Physical Therapist PT                    PT Recommendation and Plan  Anticipated Equipment Needs At Discharge: front wheeled walker  Anticipated Discharge Disposition: home with assist  Planned Therapy Interventions: gait training, home exercise program, stair training, transfer training  PT Frequency: 5 times/wk  Plan of Care Review  Plan Of Care Reviewed With: patient  Outcome Summary/Follow up Plan: Pt working toward goal attainment. Pt needs cont PT to increase ex tolerance and safety.          Outcome Measures       03/02/17 0903 03/02/17 0902       How much help from another person do you currently need...    Turning from your back to your side while in flat  bed without using bedrails?  4  -AW     Moving from lying on back to sitting on the side of a flat bed without bedrails?  3  -AW     Moving to and from a bed to a chair (including a wheelchair)?  3  -AW     Standing up from a chair using your arms (e.g., wheelchair, bedside chair)?  3  -AW     Climbing 3-5 steps with a railing?  3  -AW     To walk in hospital room?  3  -AW     AM-PAC 6 Clicks Score  19  -AW     How much help from another is currently needed...    Putting on and taking off regular lower body clothing? 4  -RB      Bathing (including washing, rinsing, and drying) 3  -RB      Toileting (which includes using toilet bed pan or urinal) 4  -RB      Putting on and taking off regular upper body clothing 4  -RB      Taking care of personal grooming (such as brushing teeth) 4  -RB      Eating meals 4  -RB      Score 23  -RB      Functional Assessment    Outcome Measure Options AM-PAC 6 Clicks Daily Activity (OT)  -RB AM-PAC 6 Clicks Basic Mobility (PT)  -AW       User Key  (r) = Recorded By, (t) = Taken By, (c) = Cosigned By    Initials Name Provider Type    RB Joshua Ruvalcaba, OT Occupational Therapist    AW Zee Owusu, PT Physical Therapist           Time Calculation:         PT Charges       03/02/17 1428 03/02/17 1040       Time Calculation    Start Time 1322  -AW 0902  -AW     Stop Time 1340  -AW 0930  -AW     Time Calculation (min) 18 min  -AW 28 min  -AW     PT Received On 03/02/17  -AW 03/02/17  -AW     PT Goal Re-Cert Due Date  03/15/17  -AW     Time Calculation- PT    Total Timed Code Minutes- PT 15 minute(s)  -AW        User Key  (r) = Recorded By, (t) = Taken By, (c) = Cosigned By    Initials Name Provider Type     Zee Owusu, PT Physical Therapist          Therapy Charges for Today     Code Description Service Date Service Provider Modifiers Qty    12644149267 HC PT MOBILITY CURRENT 3/2/2017 Zee Owusu, PT GP, CJ 1    62961715450 HC PT MOBILITY PROJECTED 3/2/2017 Zee Owusu, PT GP,  CI 1    52743910401 HC PT EVAL MOD COMPLEXITY 2 3/2/2017 Zee Owusu, PT GP 1    44942344421 HC GAIT TRAINING EA 15 MIN 3/2/2017 Zee Owusu, PT GP 1          PT G-Codes  Outcome Measure Options: AM-PAC 6 Clicks Daily Activity (OT)  Score: 19  Functional Limitation: Mobility: Walking and moving around  Mobility: Walking and Moving Around Current Status (): At least 20 percent but less than 40 percent impaired, limited or restricted  Mobility: Walking and Moving Around Goal Status (): At least 1 percent but less than 20 percent impaired, limited or restricted    Zee Owusu, PT  3/2/2017

## 2017-03-02 NOTE — PLAN OF CARE
Problem: Patient Care Overview (Adult)  Goal: Plan of Care Review  Outcome: Ongoing (interventions implemented as appropriate)    03/02/17 1423   Coping/Psychosocial Response Interventions   Plan Of Care Reviewed With patient   Outcome Evaluation   Outcome Summary/Follow up Plan Pt working toward goal attainment. Pt needs cont PT to increase ex tolerance and safety.         Problem: Inpatient Physical Therapy  Goal: Transfer Training Goal 2 LTG- PT  Outcome: Ongoing (interventions implemented as appropriate)    03/02/17 1034 03/02/17 1423   Transfer Training 2 PT LTG   Transfer Training PT 2 LTG, Date Established 03/02/17 --    Transfer Training PT 2 LTG, Time to Achieve by discharge --    Transfer Training PT 2 LTG, Activity Type sit to stand/stand to sit --    Transfer Training PT 2 LTG, [place] Level conditional independence --    Transfer Training PT 2 LTG, Assist Device walker, rolling --    Transfer Training PT 2 LTG, Date Goal Reviewed --  03/02/17   Transfer Training PT 2 LTG, Outcome --  goal ongoing       Goal: Gait Training Goal LTG- PT  Outcome: Ongoing (interventions implemented as appropriate)    03/02/17 1034 03/02/17 1423   Gait Training PT LTG   Gait Training Goal PT LTG, Date Established 03/02/17 --    Gait Training Goal PT LTG, Time to Achieve by discharge --    Gait Training Goal PT LTG, [place] Level conditional independence --    Gait Training Goal PT LTG, Assist Device walker, rolling --    Gait Training Goal PT LTG, Distance to Achieve 300 --    Gait Training Goal PT LTG, Date Goal Reviewed --  03/02/17   Gait Training Goal PT LTG, Outcome --  goal ongoing       Goal: Stair Training Goal LTG- PT  Outcome: Ongoing (interventions implemented as appropriate)    03/02/17 1034 03/02/17 1423   Stair Training PT LTG   Stair Training Goal PT LTG, Date Established 03/02/17 --    Stair Training Goal PT LTG, Time to Achieve by discharge --    Stair Training Goal PT LTG, Number of Steps 3 --     Stair Training Goal PT LTG, [place] Level supervision required --    Stair Training Goal PT LTG, Date Goal Reviewed --  03/02/17   Stair Training Goal PT LTG, Outcome --  goal ongoing

## 2017-03-02 NOTE — PLAN OF CARE
Problem: Patient Care Overview (Adult)  Goal: Plan of Care Review  Outcome: Ongoing (interventions implemented as appropriate)    03/02/17 1209   Coping/Psychosocial Response Interventions   Plan Of Care Reviewed With patient   Patient Care Overview   Progress no change   Outcome Evaluation   Outcome Summary/Follow up Plan Pain medicine has helped bring pt's pain level down, and hgb has stayed at 8.7. Pt went for a walk around the unit and states that she feels a little better today       Goal: Adult Individualization and Mutuality  Outcome: Ongoing (interventions implemented as appropriate)  Goal: Discharge Needs Assessment  Outcome: Ongoing (interventions implemented as appropriate)    Problem: Gastrointestinal Bleeding (Adult)  Goal: Signs and Symptoms of Listed Potential Problems Will be Absent or Manageable (Gastrointestinal Bleeding)  Outcome: Ongoing (interventions implemented as appropriate)    Problem: Pain, Acute (Adult)  Goal: Acceptable Pain Control/Comfort Level  Outcome: Ongoing (interventions implemented as appropriate)

## 2017-03-02 NOTE — THERAPY DISCHARGE NOTE
Acute Care - Occupational Therapy Initial Eval/Discharge  [facility]     Patient Name: Bernadette Camarena  : 1940  MRN: [id number]  Today's Date: 3/2/2017     Date of Referral to OT: 17  Referring Physician: REN Delarosa      Admit Date: 2017       ICD-10-CM ICD-9-CM   1. Lower GI bleed K92.2 578.9   2. Hypokalemia E87.6 276.8   3. General weakness R53.1 780.79   4. Non-intractable vomiting with nausea, unspecified vomiting type R11.2 787.01   5. Impaired mobility and activities of daily living Z74.09 799.89     Patient Active Problem List   Diagnosis   • Hypertension   • Osteoporosis   • History of diverticulitis   • History of malignant neoplasm of colon   • Myalgia   • Dysphagia   • Hypothyroidism   • Hyperlipidemia   • Malignant neoplasm of hepatic flexure   • Abdominal wall mass   • Metastatic colon cancer in female   • Nausea with vomiting   • Lower GI bleed     Past Medical History   Diagnosis Date   • Acute sinusitis    • Anemia    • Anxiety    • Artificial lens present    • Cystitis    • Disease of thyroid gland    • Diverticular disease    • Diverticulitis of colon    • Dysphagia    • Dysuria    • GERD (gastroesophageal reflux disease)      With dysphagia   • Heart murmur    • History of malignant neoplasm of colon      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 Right colon cancer   • Endoscopy and colonoscopy  2015      Internal & external hemorrhoids found. Diverticulum in sigmoid colon & descending colon   • Colonoscopy  02/10/2016     No specimens collected. Mild diverticulosis in the sigmoid colon.Malignant appearing tumor in the colon. Biopsied   • Esophagoscopy / egd  02/10/2016     With biopsy-Benign appearing esophageal stenosis.Chronic gastritis.Biopsied.Normal examined duodenum   • Esophagoscopy / egd  02/12/2015     with tube-Esophageal stricture present. Dilatation performed. Gastritis in stomach. Biopsy taken. Normal duodenum   • Venous access device (port) insertion  03/10/2016     Ultrasound guided right Mediport placement under fluoroscopy   • Cataract extraction  10/15/2015     Cataract extraction with intraocular lens implantation, left eye          OT ASSESSMENT FLOWSHEET (last 72 hours)      OT Evaluation       03/02/17 0903 03/02/17 0902 03/01/17 1510 03/01/17 1509 02/28/17 2100    Rehab Evaluation    Document Type evaluation  -RB evaluation  -AW       Subjective Information agree to therapy;no complaints  -RB no complaints;agree to therapy  -AW       Patient Effort, Rehab Treatment adequate  -RB        General Information    Patient Profile Review yes  -RB yes  -AW       Referring Physician REN Delarosa  -RB Dr ROSEMARIE Delarosa  -AW       Pertinent History Of Current Problem  --   Pt had became weak and reports inability to walk to kitchen.  -AW       Prior Level of Function independent:;gait;transfer;ADL's  -RB independent:;gait;transfer  -AW       Equipment Currently Used at Home grab bar  -RB none  -AW walker, rolling;shower chair  -EW  none  -TC    Plans/Goals Discussed With patient  -RB patient  -AW       Benefits Reviewed patient:  -RB patient:;improve function;increase independence;increase strength  -AW       Barriers to Rehab none identified  -RB medically complex  -AW       Living Environment    Lives With significant other  -RB significant other  -AW friend(s)  -EW  significant other  -TC    Living  Arrangements house  -RB house  -AW house  -EW  house  -TC    Home Accessibility stairs to enter home  -RB stairs to enter home  -AW   no concerns  -TC    Number of Stairs to Enter Home 2  -RB 3  -AW       Stair Railings at Home none  -RB none  -AW   none  -TC    Type of Financial/Environmental Concern     none  -TC    Transportation Available family or friend will provide  -RB  family or friend will provide  -EW  car  -TC    Clinical Impression    Date of Referral to OT 03/01/17  -RB        Functional Level Prior    Ambulation 0-->independent  -RB   0-->independent  -EW 0-->independent  -TC    Transferring 0-->independent  -RB   0-->independent  -EW 0-->independent  -TC    Toileting 0-->independent  -RB   0-->independent  -EW 0-->independent  -TC    Bathing 0-->independent  -RB   1-->assistive equipment  -EW 0-->independent  -TC    Dressing 0-->independent  -RB   0-->independent  -EW 0-->independent  -TC    Eating 0-->independent  -RB   0-->independent  -EW 0-->independent  -TC    Communication 0-->understands/communicates without difficulty  -RB   0-->understands/communicates without difficulty  -EW 0-->understands/communicates without difficulty  -TC    Swallowing 0-->swallows foods/liquids without difficulty  -RB   0-->swallows foods/liquids without difficulty  -EW 0-->swallows foods/liquids without difficulty  -TC    Prior Functional Level Comment     independent  -TC    Vital Signs    Pre Systolic BP Rehab 125  -  -AW       Pre Treatment Diastolic BP 49  -RB 49  -AW       Post Systolic BP Rehab 132  -  -AW       Post Treatment Diastolic BP 54  -RB 54  -AW       Pretreatment Heart Rate (beats/min) 75  -RB 75  -AW       Posttreatment Heart Rate (beats/min) 89  -RB 89  -AW       Pre SpO2 (%) 92  -RB 92  -AW       O2 Delivery Pre Treatment room air  -RB room air  -AW       Post SpO2 (%) 96  -RB 96  -AW       O2 Delivery Post Treatment room air  -RB        Pre Patient Position Supine  -RB        Intra  Patient Position Standing  -RB        Post Patient Position Supine  -RB        Pain Assessment    Pain Assessment 0-10  -RB 0-10  -AW       Pain Score 0  -RB 0  -AW       Post Pain Score 2  -RB 2  -AW       Pain Type Acute pain  -RB Acute pain  -AW       Pain Location Abdomen  -RB Abdomen  -AW       Vision Assessment/Intervention    Visual Impairment WFL with corrective lenses  -RB        Cognitive Assessment/Intervention    Current Cognitive/Communication Assessment functional  -RB functional  -AW       Orientation Status oriented x 4  -RB oriented x 4  -AW       Follows Commands/Answers Questions 100% of the time  -% of the time  -AW       Personal Safety WNL/WFL  -RB        ROM (Range of Motion)    General ROM no range of motion deficits identified  -RB no range of motion deficits identified  -AW       MMT (Manual Muscle Testing)    General MMT Assessment no strength deficits identified  -RB no strength deficits identified  -AW       General MMT Assessment Detail 4+/5 B UEs  -RB        Bed Mobility, Assessment/Treatment    Bed Mob, Supine to Sit, [place] supervision required  -RB supervision required  -AW       Bed Mob, Sit to Supine, [place]  not tested  -AW       Transfer Assessment/Treatment    Transfers, Sit-Stand [place] supervision required  -RB stand by assist  -AW       Transfers, Stand-Sit [place] supervision required  -RB stand by assist  -AW       Transfers, Sit-Stand-Sit, Assist Device rolling walker  -RB rolling walker  -AW       Functional Mobility    Functional Mobility- Ind. Level supervision required  -RB        Functional Mobility- Device rolling walker  -RB        Functional Mobility-Distance (Feet) 350  -RB        Lower Body Dressing Assessment/Training    LB Dressing Assess/Train, Clothing Type socks  -RB        LB Dressing Assess/Train, Position sitting  -RB        LB Dressing Assess/Train, [place] independent  -RB        Sensory Assessment/Intervention     Sensory Impairment  dull  -AW       Light Touch  LLE;RLE  -AW       Positioning and Restraints    Pre-Treatment Position in bed  -RB in bed  -AW       Post Treatment Position chair  -RB chair  -AW       In Chair with family/caregiver  -RB with family/caregiver;encouraged to call for assist  -AW         User Key  (r) = Recorded By, (t) = Taken By, (c) = Cosigned By    Initials Name Effective Dates    RB Joshua Ruvalcaba OT 06/15/16 -     AW Zee Owusu, PT 10/17/16 -     SIMONE Berger Jr. RN 10/17/16 -     EW Abbey Pat 01/05/17 -           Occupational Therapy Education     Title: PT OT SLP Therapies (Active)     Topic: Occupational Therapy (Active)     Point: Precautions (Done)    Description: Instruct learner(s) on prescribed precautions during self-care and functional transfers.    Learning Progress Summary    Learner Readiness Method Response Comment Documented by Status   Patient Acceptance E VU   03/02/17 1055 Done                      User Key     Initials Effective Dates Name Provider Type Discipline     06/15/16 -  Joshua Ruvalcaba OT Occupational Therapist OT                OT Recommendation and Plan                     Outcome Measures       03/02/17 0903 03/02/17 0902       How much help from another person do you currently need...    Turning from your back to your side while in flat bed without using bedrails?  4  -AW     Moving from lying on back to sitting on the side of a flat bed without bedrails?  3  -AW     Moving to and from a bed to a chair (including a wheelchair)?  3  -AW     Standing up from a chair using your arms (e.g., wheelchair, bedside chair)?  3  -AW     Climbing 3-5 steps with a railing?  3  -AW     To walk in hospital room?  3  -AW     AM-PAC 6 Clicks Score  19  -AW     How much help from another is currently needed...    Putting on and taking off regular lower body clothing? 4  -RB      Bathing (including washing, rinsing, and drying) 3  -RB      Toileting (which  includes using toilet bed pan or urinal) 4  -RB      Putting on and taking off regular upper body clothing 4  -RB      Taking care of personal grooming (such as brushing teeth) 4  -RB      Eating meals 4  -RB      Score 23  -RB      Functional Assessment    Outcome Measure Options AM-PAC 6 Clicks Daily Activity (OT)  -RB AM-PAC 6 Clicks Basic Mobility (PT)  -AW       User Key  (r) = Recorded By, (t) = Taken By, (c) = Cosigned By    Initials Name Provider Type    KARMEN Ruvalcaba OT Occupational Therapist    AW Zee Owusu, PT Physical Therapist          Time Calculation:         Time Calculation- OT       03/02/17 1056          Time Calculation- OT    OT Start Time 0903  -RB      OT Stop Time 0930  -RB      OT Time Calculation (min) 27 min  -RB        User Key  (r) = Recorded By, (t) = Taken By, (c) = Cosigned By    Initials Name Provider Type    KARMEN Ruvalcaba OT Occupational Therapist          Therapy Charges for Today     Code Description Service Date Service Provider Modifiers Qty    17028876576  OT SELFCARE CURRENT 3/2/2017 Joshua Ruvalcaba OT GO, CI 1    95007989951 HC OT SELFCARE PROJECTED 3/2/2017 Joshua Ruvalcaba OT GO, CI 1    [id number]  OT SELFCARE DISCHARGE 3/2/2017 Joshua Ruvalcaba OT GO, CI 1    [id number]  OT EVAL LOW COMPLEXITY 1 3/2/2017 Joshua Ruvalcaba OT GO 1          OT G-codes  OT Professional Judgement Used?: Yes  OT Functional Scales Options: AM-PAC 6 Clicks Daily Activity (OT)  Score: 23  Functional Limitation: Self care  Self Care Current Status (): At least 1 percent but less than 20 percent impaired, limited or restricted  Self Care Goal Status (): At least 1 percent but less than 20 percent impaired, limited or restricted  Self Care Discharge Status (): At least 1 percent but less than 20 percent impaired, limited or restricted         Joshua Ruvalcaba OT  3/2/2017

## 2017-03-03 NOTE — SIGNIFICANT NOTE
03/03/17 1433   Rehab Treatment   Discipline physical therapy assistant   Treatment Not Performed patient/family declined treatment  (6 visitors present and pt and family declined PT today)

## 2017-03-03 NOTE — PLAN OF CARE
Problem: Patient Care Overview (Adult)  Goal: Plan of Care Review  Outcome: Ongoing (interventions implemented as appropriate)    03/03/17 0236   Coping/Psychosocial Response Interventions   Plan Of Care Reviewed With patient;daughter   Patient Care Overview   Progress no change   Outcome Evaluation   Outcome Summary/Follow up Plan Patient complains of pain. Pain medication administered as ordered. Labs monitored. Iron infused per orders. IVF infusing. Vital signs stable. Hospice consulted. Will continue to monitor patient.        Goal: Adult Individualization and Mutuality  Outcome: Ongoing (interventions implemented as appropriate)  Goal: Discharge Needs Assessment  Outcome: Ongoing (interventions implemented as appropriate)    Problem: Gastrointestinal Bleeding (Adult)  Goal: Signs and Symptoms of Listed Potential Problems Will be Absent or Manageable (Gastrointestinal Bleeding)  Outcome: Ongoing (interventions implemented as appropriate)    Problem: Pain, Acute (Adult)  Goal: Acceptable Pain Control/Comfort Level  Outcome: Ongoing (interventions implemented as appropriate)

## 2017-03-03 NOTE — PROGRESS NOTES
[facility] Medicine Services  INPATIENT PROGRESS NOTE    Length of Stay: 1  Date of Admission: 2/28/2017  Primary Care Physician: Kyler Kim MD    Subjective   Chief Complaint: Abdominal pain  HPI: Patient continues to complain of abdominal pain that is intermittent in nature. Pain medication helping to alleviate her symptoms. Having adequate oral intake. No overnight events.     Review of Systems   Constitutional: Negative for chills and fever.   Respiratory: Negative for shortness of breath.    Cardiovascular: Negative for chest pain.   Gastrointestinal: Positive for abdominal pain. Negative for diarrhea, nausea and vomiting.   Genitourinary: Negative for dysuria.   Neurological: Negative for dizziness.        All pertinent negatives and positives are as above. All other systems have been reviewed and are negative unless otherwise stated.     Objective    Temp:  [97 °F (36.1 °C)-97.4 °F (36.3 °C)] 97.3 °F (36.3 °C)  Heart Rate:  [68-77] 74  Resp:  [18] 18  BP: (120-167)/(58-78) 141/64  Physical Exam   Constitutional: She is oriented to person, place, and time. She appears well-developed and well-nourished.   Cardiovascular: Normal rate, regular rhythm and normal heart sounds.  Exam reveals no gallop and no friction rub.    No murmur heard.  Pulmonary/Chest: Effort normal and breath sounds normal. No respiratory distress. She has no wheezes. She has no rales.   Abdominal: Soft. Bowel sounds are normal. She exhibits no mass. There is tenderness. There is no rebound and no guarding. No hernia.   Musculoskeletal: Normal range of motion. She exhibits no edema.   Neurological: She is alert and oriented to person, place, and time.   Skin: Skin is warm and dry.   Psychiatric: She has a normal mood and affect. Her behavior is normal.   Vitals reviewed.            Results Review:  I have reviewed the labs, radiology results, and diagnostic studies.    Laboratory Data:   Lab  Results (last 24 hours)     Procedure Component Value Units Date/Time    Blood Culture [[id number]]  (Normal) Collected:  02/28/17 1827    Specimen:  Blood from Arm, Left Updated:  03/02/17 1901     Blood Culture No growth at 2 days     Blood Culture [[id number]]  (Normal) Collected:  02/28/17 1827    Specimen:  Blood from Arm, Left Updated:  03/02/17 1901     Blood Culture No growth at 2 days     CBC & Differential [[id number]] Collected:  03/03/17 0522    Specimen:  Blood Updated:  03/03/17 0655    Narrative:       The following orders were created for panel order CBC & Differential.  Procedure                               Abnormality         Status                     ---------                               -----------         ------                     CBC Auto Differential[[id number]]         Abnormal            Final result                 Please view results for these tests on the individual orders.    CBC Auto Differential [[id number]]  (Abnormal) Collected:  03/03/17 0522    Specimen:  Blood Updated:  03/03/17 0655     WBC 7.59 10*3/mm3      RBC 3.26 (L) 10*6/mm3      Hemoglobin 9.1 (L) g/dL      Hematocrit 27.4 (L) %      MCV 84.0 fL      MCH 27.9 pg      MCHC 33.2 g/dL      RDW 15.3 (H) %      RDW-SD 47.2 (H) fl      MPV 9.1 fL      Platelets 248 10*3/mm3      Neutrophil % 70.2 %      Lymphocyte % 15.2 %      Monocyte % 11.3 %      Eosinophil % 2.5 %      Basophil % 0.3 %      Immature Grans % 0.5 %      Neutrophils, Absolute 5.33 10*3/mm3      Lymphocytes, Absolute 1.15 10*3/mm3      Monocytes, Absolute 0.86 10*3/mm3      Eosinophils, Absolute 0.19 10*3/mm3      Basophils, Absolute 0.02 10*3/mm3      Immature Grans, Absolute 0.04 (H) 10*3/mm3     Basic Metabolic Panel [[id number]]  (Abnormal) Collected:  03/03/17 0522    Specimen:  Blood Updated:  03/03/17 0729     Glucose 82 mg/dL      BUN 8 mg/dL      Creatinine 0.54 mg/dL      Sodium 134 (L) mmol/L      Potassium 3.8 mmol/L      Chloride 103 mmol/L      CO2 24.0  mmol/L      Calcium 8.6 mg/dL      eGFR Non African Amer 110 mL/min/1.73      BUN/Creatinine Ratio 14.8      Anion Gap 7.0 mmol/L     Narrative:       The MDRD GFR formula is only valid for adults with stable renal function between ages 18 and 70.          Culture Data:   BLOOD CULTURE   Date Value Ref Range Status   02/28/2017 No growth at 2 days  Preliminary   02/28/2017 No growth at 2 days  Preliminary     URINE CULTURE   Date Value Ref Range Status   03/01/2017 Mixed Culture  Final     No results found for: RESPCX  No results found for: WOUNDCX  No results found for: STOOLCX  No components found for: BODYFLD    Radiology Data:   Imaging Results (last 24 hours)     ** No results found for the last 24 hours. **          I have reviewed the patient current medications.     Assessment/Plan   1) Metastatic colon cancer   2) Iron deficiency anemia  3) Hypothyroidism   4) Abdominal pain, likely related to #1  5) Generalized weakness with functional decline     Plan:  Dr. Booth has discussed with family poor prognosis and the extensive metastatic colon cancer. Family and patient have decided on palliation and comfort care. Hospice has been consulted and hospice nurse will come speak to patient and family today.   IV Iron started to help with iron deficiency anemia  Continue Synthroid  Continue Fentanyl patch, Morphine for breakthrough pain     Disposition will be based on whether patient will be accepted to hospice and options for hospice care.        Malcolm Li MD   03/03/17   11:38 AM

## 2017-03-03 NOTE — DISCHARGE PLACEMENT REQUEST
"Gretchen Lyon (77 y.o. Female)     Date of Birth Social Security Number Address Home Phone MRN    [id number]  [address]  [address]  [place] [address] [phone] [id number]    [employer]oo Marital Status          Rastafarian        Admission Date Admission Type Admitting Provider Attending Provider Department, Room/Bed    2/28/17 Emergency Nick Dejesus MD Popescu, Tudor, MD [address], 402/1    Discharge Date Discharge Disposition Discharge Destination         Hospice/Medical Facility ([facility])             Attending Provider: Nick Dejesus MD     Allergies:  Latex, Adhesive Tape    Isolation:  None   Infection:  None   Code Status:  Conditional    Ht:  65\" (165.1 cm)   Wt:  202 lb (91.6 kg)    Admission Cmt:  None   Principal Problem:  None                Active Insurance as of 2/28/2017     Primary Coverage     Payor Plan Insurance Group Employer/Plan Group    MEDICARE MEDICARE A & B      Payor Plan Address Payor Plan Phone Number Effective From Effective To    [address] [phone] 2/1/2007     [place] [address]       Subscriber Name Subscriber Birth Date Member ID       GRETCHEN LYON 1940 [id number]           Secondary Coverage     Payor Plan Insurance Group Employer/Plan Group    [facility] SUPP [id number]     Payor Plan Address Payor Plan Phone Number Effective From Effective To    [address]  12/1/2016     [place] [address]       Subscriber Name Subscriber Birth Date Member ID       GRETCHEN LYON 1940 [id number]                 Emergency Contacts      (Rel.) Home Phone Work Phone Mobile Phone    Eladio Shetty (Friend) [phone] -- [phone]    No name specified -- -- --            Emergency Contact Information     Name Relation Home Work Mobile    Eladio Shetty Friend [phone]    No name specified              Insurance Information                " MEDICARE/MEDICARE A & B Phone: [phone]    Subscriber: Bernadette Camarena Subscriber#: [id number]    Group#:  Precert#:         [facility] [place] [facility] SUPP Phone:     Subscriber: Bernadette Camarena Subscriber#: [id number]    Group#: [id number] Precert#:              History & Physical      Herrera Ashok Peña MD at 2/28/2017  8:47 PM                [facility] Medicine Admission      Date of Admission: 2/28/2017      Primary Care Physician: Kyler Kim MD    Following information is obtained partially from patient, family and/or medical records.    Time of encounter: 2/28/2017 at 8:30 PM    Chief Complaint:   Chief Complaint   Patient presents with   • Weakness - Generalized   • Nausea   • Diarrhea   • Vomiting   • Abdominal Pain       HPI: Pt is a 77 y.o. female presenting with known history of colon cancer with metastasis to spleen and abdominal wall presenting today with severe generalized weakness has been going on for the last 2 to months.  She states that it was probably present for this as well however has progressively worsened over the course of this time.  For her cancer she is taking an immune booster along with chemotherapy.  She sees Dr. Booth regularly.  She has numerous questions regarding prognosis and potential outcomes.  At this point she is now unable to do activities of daily living such as showering and changing clothes.  She complains of chronic rectal bleed that has been present since initial diagnosis of colon cancer.  She reports that her rectal bleeding has not changed from baseline.       Concurrent Medical History:   Patient Active Problem List   Diagnosis   • Hypertension   • Osteoporosis   • History of diverticulitis   • History of malignant neoplasm of colon   • Myalgia   • Dysphagia   • Hypothyroidism   • Hyperlipidemia   • Malignant neoplasm of hepatic flexure   • Abdominal wall mass   • Metastatic colon cancer in female   • Nausea  with vomiting   • Lower GI bleed       Past Surgical History:   Past Surgical History   Procedure Laterality Date   •  section     • Hysterectomy     • Carpal tunnel release  2014     Left carpal tunnel release   • Cholecystectomy  2016     Laparoscopic converted to open hemicolectomy. Right colon cancer   • Endoscopy and colonoscopy  2015     Internal & external hemorrhoids found. Diverticulum in sigmoid colon & descending colon   • Colonoscopy  02/10/2016     No specimens collected. Mild diverticulosis in the sigmoid colon.Malignant appearing tumor in the colon. Biopsied   • Esophagoscopy / egd  02/10/2016     With biopsy-Benign appearing esophageal stenosis.Chronic gastritis.Biopsied.Normal examined duodenum   • Esophagoscopy / egd  2015     with tube-Esophageal stricture present. Dilatation performed. Gastritis in stomach. Biopsy taken. Normal duodenum   • Venous access device (port) insertion  03/10/2016     Ultrasound guided right Mediport placement under fluoroscopy   • Cataract extraction  10/15/2015     Cataract extraction with intraocular lens implantation, left eye       Family History: family history includes Arthritis in her mother; Cancer in her sister; Diabetes in her father and mother; Heart disease in her father and mother; Hyperlipidemia in her father and mother; Stroke in her father; Vision loss in her father and mother.    Social History:  reports that she has never smoked. She has never used smokeless tobacco. She reports that she does not drink alcohol.    Allergies:   Allergies   Allergen Reactions   • Latex    • Adhesive Tape Rash       Home Medications:   Prior to Admission medications    Medication Sig Start Date End Date Taking? Authorizing Provider   aspirin 81 MG EC tablet Take 81 mg by mouth Every Other Day.   Yes Historical Provider, MD   atorvastatin (LIPITOR) 20 MG tablet Take 20 mg by mouth Every Evening.   Yes Historical Provider, MD   diltiazem CD  (CARDIZEM CD) 120 MG 24 hr capsule Take 120 mg by mouth every 12 (twelve) hours.   Yes Historical Provider, MD   escitalopram (LEXAPRO) 10 MG tablet Take 10 mg by mouth daily.   Yes Historical Provider, MD   hydrochlorothiazide (HYDRODIURIL) 25 MG tablet Take 25 mg by mouth daily.   Yes Historical Provider, MD   labetalol (NORMODYNE) 100 MG tablet Take 100 mg by mouth 2 (two) times a day.   Yes Historical Provider, MD   levothyroxine (SYNTHROID, LEVOTHROID) 100 MCG tablet Take 1 tablet by mouth Daily. 2/13/17  Yes Shravan Mota MD   megestrol (MEGACE) 40 MG/ML suspension Take 10 mL by mouth Daily. 2/20/17  Yes Irving Booth MD   ondansetron (ZOFRAN) 4 MG tablet Take 1 tablet by mouth 4 (Four) Times a Day As Needed for nausea or vomiting. 2/20/17  Yes Irving Booth MD   oxyCODONE (ROXICODONE) 5 MG immediate release tablet Take 1 tablet by mouth Every 8 (Eight) Hours As Needed for moderate pain (4-6) or severe pain (7-10). 2/20/17  Yes Irving Booth MD   pantoprazole (PROTONIX) 40 MG EC tablet Take 40 mg by mouth daily. Take every morning before breakfast   Yes Historical Provider, MD   calcium carbonate-cholecalciferol 500-400 MG-UNIT tablet tablet Take  by mouth daily.  2/28/17  Historical Provider, MD   ferrous sulfate 325 (65 FE) MG tablet Take 325 mg by mouth 3 (three) times a day with meals.  2/28/17  Historical Provider, MD   HYDROcodone-acetaminophen (NORCO) 7.5-325 MG per tablet  11/7/16 2/28/17  Historical Provider, MD   HYDROcodone-acetaminophen (NORCO) 7.5-325 MG per tablet Take 1 tablet by mouth Every 6 (Six) Hours As Needed for moderate pain (4-6). 1/27/17 2/28/17  Irving Booth MD   levothyroxine (SYNTHROID, LEVOTHROID) 75 MCG tablet  1/30/17 2/28/17  Historical Provider, MD   metroNIDAZOLE (FLAGYL) 500 MG tablet  10/24/16 2/28/17  Historical Provider, MD   Multiple Vitamins-Minerals (CENTRUM SILVER ULTRA WOMENS PO) Take 1 tablet by mouth daily.  2/28/17  Historical Provider, MD   neomycin  (MYCIFRADIN) 500 MG tablet  10/24/16 2/28/17  Historical Provider, MD   ondansetron (ZOFRAN) 4 MG tablet Take 4 mg by mouth every 8 (eight) hours as needed for nausea or vomiting.  2/28/17  Historical Provider, MD   potassium chloride (K-DUR,KLOR-CON) 20 MEQ CR tablet Take 20 mEq by mouth 2 (two) times a day.  2/28/17  Historical Provider, MD       Review of Systems:  Review of Systems   Constitutional: Positive for fatigue.   HENT: Negative.    Eyes: Negative.    Respiratory: Negative.    Cardiovascular: Negative.    Gastrointestinal: Positive for blood in stool, diarrhea, nausea and vomiting.   Endocrine: Negative.    Genitourinary: Negative.    Skin: Negative.    Neurological: Positive for weakness.      Otherwise complete ROS is negative except as mentioned above and in HPI.    Physical Exam:   Temp:  [97.5 °F (36.4 °C)] 97.5 °F (36.4 °C)  Heart Rate:  [76-93] 76  Resp:  [16-17] 16  BP: (136-153)/(62-70) 150/68  Physical Exam   Constitutional: She is oriented to person, place, and time. She appears well-developed and well-nourished.   HENT:   Head: Normocephalic and atraumatic.   Nose: Nose normal.   Mouth/Throat: Oropharynx is clear and moist.   Eyes: Conjunctivae are normal. Pupils are equal, round, and reactive to light. No scleral icterus.   Neck: No tracheal deviation present.   Cardiovascular: Normal heart sounds.  Exam reveals no friction rub.    Pulmonary/Chest: Effort normal and breath sounds normal. No respiratory distress. She has no wheezes. She has no rales.   Abdominal: Soft. Bowel sounds are normal. She exhibits no distension. There is no tenderness.   Musculoskeletal: Normal range of motion.   Neurological: She is alert and oriented to person, place, and time.   Skin: Skin is warm and dry.   Psychiatric: Her affect is blunt. Her speech is delayed. She is slowed. She exhibits a depressed mood.         Results Reviewed:  I have personally reviewed current lab, radiology, and data and agree with  results.  Lab Results (last 24 hours)     Procedure Component Value Units Date/Time    Influenza Antigen [[id number]]  (Normal) Collected:  02/28/17 1758    Specimen:  Swab from Nasopharynx Updated:  02/28/17 1822     Influenza A Ag, EIA Negative      Influenza B Ag, EIA Negative     [place] Draw [[id number]] Collected:  02/28/17 1827    Specimen:  Blood Updated:  02/28/17 1832    Narrative:       The following orders were created for panel order [place] Draw.  Procedure                               Abnormality         Status                     ---------                               -----------         ------                     Light Blue Top[[id number]]                                    In process                 Green Top (Gel)[[id number]]                                   In process                 Lavender Top[[id number]]                                      In process                 Gold Top - SST[[id number]]                                    In process                   Please view results for these tests on the individual orders.    Lavender Top [[id number]] Collected:  02/28/17 1827    Specimen:  Blood Updated:  02/28/17 1832    Light Blue Top [[id number]] Collected:  02/28/17 1827    Specimen:  Blood Updated:  02/28/17 1832    Gold Top - SST [[id number]] Collected:  02/28/17 1827    Specimen:  Blood Updated:  02/28/17 1832    Green Top (Gel) [[id number]] Collected:  02/28/17 1827    Specimen:  Blood Updated:  02/28/17 1832    Blood Culture [[id number]] Collected:  02/28/17 1827    Specimen:  Blood from Arm, Left Updated:  02/28/17 1832    Blood Culture [[id number]] Collected:  02/28/17 1827    Specimen:  Blood from Arm, Left Updated:  02/28/17 1832    Lactic Acid, Plasma [[id number]]  (Normal) Collected:  02/28/17 1827    Specimen:  Blood Updated:  02/28/17 1845     Lactate 1.0 mmol/L     Comprehensive Metabolic Panel [[id number]]  (Abnormal) Collected:  02/28/17 1827    Specimen:  Blood Updated:  02/28/17 1846     Glucose 90  mg/dL      BUN 25 (H) mg/dL      Creatinine 0.75 mg/dL      Sodium 130 (L) mmol/L      Potassium 3.1 (L) mmol/L      Chloride 92 (L) mmol/L      CO2 27.0 mmol/L      Calcium 8.7 mg/dL      Total Protein 5.4 (L) g/dL      Albumin 3.30 (L) g/dL      ALT (SGPT) 30 U/L      AST (SGOT) 19 U/L      Alkaline Phosphatase 88 U/L      Total Bilirubin 0.6 mg/dL      eGFR Non African Amer 75 mL/min/1.73      Globulin 2.1 (L) gm/dL      A/G Ratio 1.6 g/dL      BUN/Creatinine Ratio 33.3 (H)      Anion Gap 11.0 mmol/L     Narrative:       The MDRD GFR formula is only valid for adults with stable renal function between ages 18 and 70.    Protime-INR [[id number]]  (Abnormal) Collected:  02/28/17 1827    Specimen:  Blood Updated:  02/28/17 1853     Protime 15.7 (H) Seconds      INR 1.26 (H)     Narrative:       Therapeutic range for most indications is 2.0-3.0 INR,  or 2.5-3.5 for mechanical heart valves.    Scan Slide [[id number]] Collected:  02/28/17 1827    Specimen:  Blood Updated:  02/28/17 1854    CBC & Differential [[id number]] Collected:  02/28/17 1827    Specimen:  Blood Updated:  02/28/17 1854    Narrative:       The following orders were created for panel order CBC & Differential.  Procedure                               Abnormality         Status                     ---------                               -----------         ------                     Scan Slide[[id number]]                                        In process                 CBC Auto Differential[[id number]]         Abnormal            Final result                 Please view results for these tests on the individual orders.    CBC Auto Differential [[id number]]  (Abnormal) Collected:  02/28/17 1827    Specimen:  Blood Updated:  02/28/17 1854     WBC 11.41 (H) 10*3/mm3      RBC 3.88 10*6/mm3      Hemoglobin 10.8 (L) g/dL      Hematocrit 31.6 (L) %      MCV 81.4 fL      MCH 27.8 pg      MCHC 34.2 g/dL      RDW 14.9 (H) %      RDW-SD 44.2 fl      MPV 8.9 fL      Platelets  206 10*3/mm3      Neutrophil % 77.5 %      Lymphocyte % 7.4 (L) %      Monocyte % 13.5 (H) %      Eosinophil % 1.1 %      Basophil % 0.1 %      Immature Grans % 0.4 %      Neutrophils, Absolute 8.85 (H) 10*3/mm3      Lymphocytes, Absolute 0.84 10*3/mm3      Monocytes, Absolute 1.54 (H) 10*3/mm3      Eosinophils, Absolute 0.13 10*3/mm3      Basophils, Absolute 0.01 10*3/mm3      Immature Grans, Absolute 0.04 (H) 10*3/mm3     Troponin [[id number]]  (Normal) Collected:  02/28/17 1827    Specimen:  Blood Updated:  02/28/17 1857     Troponin I <0.012 ng/mL         Imaging Results (last 24 hours)     Procedure Component Value Units Date/Time    XR Chest 1 View [[id number]] Collected:  02/28/17 1906     Updated:  02/28/17 1910    Narrative:       Patient Name:  GRETCHEN LYON  Patient ID:  [id number]   Ordering:  KAREN HOLLIDAY  Attending:  KAREN HOLLIDAY  Referring:  KAREN HOLLIDAY  ------------------------------------------------  PROCEDURE: XR CHEST 1 VIEW    INDICATION FOR PROCEDURE:  77 years -old patient presents for  evaluation of a simple sepsis protocol.    COMPARISON:  Chest radiograph dated August 3, 2015    FINDINGS: XR CHEST 1 view  reveals the lungs are expanded.  There  is mild elevation of the right hemidiaphragm. Patient has  right-sided Mediport catheter with the tip of the catheter in the  superior vena cava. Cardiac monitoring leads are present.    There is atherosclerotic calcification of the thoracic aorta.  Thoracic aorta is mildly tortuous. There is no radiographic  evidence for airspace consolidation, pleural effusion or  pneumothorax. Mediastinal and cardiac silhouettes are within  normal limits.     The skeletal structures are within normal limits.       Impression:       No radiographic evidence of acute cardiopulmonary  disease.    Electronically signed by:  Rashmi Longoria MD  2/28/2017 7:09 PM CST  Workstation: [employer]              Assessment and Plan:   1.  Colon cancer with metastases: Will  consult Dr. Booth for input.   2.  Hypothyroidism: Patient's TSH is 19 as of last checked.  This may be contributing to her generalized weakness.  Will give IV replacement for tonight.  3.  Chronic anemia: Secondary to chronic rectal bleed. Will obtain serial H&H.  Monitor for trend.    I discussed the patients findings and my recommendations with:     Javier Peña MD  02/28/17  8:47 PM       Electronically signed by Javier Peña MD at 2/28/2017  8:57 PM        Vital Signs (last 24 hours)       03/02 0700  -  03/03 0659 03/03 0700  -  03/03 1522   Most Recent    Temp (°F) 97 -  97.4      97.3     97.3 (36.3)    Heart Rate 66 -  77      74     74    Resp   18      18     18    /58 -  167/68      141/64     141/64    SpO2 (%) 90 -  93      91     91          Lines, Drains & Airways    Active LDAs     Name:   Placement date:   Placement time:   Site:   Days:    Implanted Port - Single Lumen Port 02/28/17 infraclavicular fossa, right pressure injectable catheter  02/28/17          3                Hospital Medications (active)       Dose Frequency Start End    aspirin EC tablet 81 mg 81 mg Every Other Day 2/28/2017     Sig - Route: Take 1 tablet by mouth Every Other Day. - Oral    atorvastatin (LIPITOR) tablet 20 mg 20 mg Every Evening 2/28/2017     Sig - Route: Take 1 tablet by mouth Every Evening. - Oral    diltiaZEM CD (CARDIZEM CD) 24 hr capsule 120 mg 120 mg Every 24 Hours Scheduled 3/1/2017     Sig - Route: Take 1 capsule by mouth Daily. - Oral    escitalopram (LEXAPRO) tablet 10 mg 10 mg Daily 3/1/2017     Sig - Route: Take 1 tablet by mouth Daily. - Oral    fentaNYL (DURAGESIC) 12 MCG/HR 1 patch 1 patch Every 72 Hours 3/2/2017 3/14/2017    Sig - Route: Place 1 patch on the skin Every 72 (Seventy-Two) Hours. - Transdermal    ferric gluconate (FERRLECIT) 125 mg in sodium chloride 0.9 % 100 mL 125 mg Daily 3/2/2017 3/7/2017    Sig - Route: Infuse 125 mg into a venous catheter Daily. - Intravenous     "hydrochlorothiazide (HYDRODIURIL) tablet 25 mg 25 mg Daily 3/1/2017     Sig - Route: Take 1 tablet by mouth Daily. - Oral    labetalol (NORMODYNE) tablet 100 mg 100 mg 2 Times Daily 2/28/2017     Sig - Route: Take 1 tablet by mouth 2 (Two) Times a Day. - Oral    levothyroxine (SYNTHROID, LEVOTHROID) tablet 125 mcg 125 mcg Every Early Morning 3/2/2017     Sig - Route: Take 1 tablet by mouth Every Morning. - Oral    megestrol (MEGACE) 40 MG/ML suspension 400 mg 400 mg Daily 3/1/2017     Sig - Route: Take 10 mL by mouth Daily. - Oral    Morphine sulfate (PF) injection 1 mg 1 mg Every 4 Hours PRN 2/28/2017 3/10/2017    Sig - Route: Infuse 0.5 mL into a venous catheter Every 4 (Four) Hours As Needed for severe pain (7-10). - Intravenous    Linked Group 1:  \"And\" Linked Group Details        naloxone (NARCAN) injection 0.4 mg 0.4 mg Every 5 Minutes PRN 2/28/2017     Sig - Route: Infuse 1 mL into a venous catheter Every 5 (Five) Minutes As Needed for respiratory depression. - Intravenous    Linked Group 1:  \"And\" Linked Group Details        ondansetron (ZOFRAN) tablet 4 mg 4 mg 4 Times Daily PRN 2/28/2017     Sig - Route: Take 1 tablet by mouth 4 (Four) Times a Day As Needed for nausea or vomiting. - Oral    oxyCODONE (ROXICODONE) immediate release tablet 5 mg 5 mg Every 8 Hours PRN 2/28/2017     Sig - Route: Take 1 tablet by mouth Every 8 (Eight) Hours As Needed for moderate pain (4-6) or severe pain (7-10). - Oral    pantoprazole (PROTONIX) EC tablet 40 mg 40 mg Daily 3/1/2017     Sig - Route: Take 1 tablet by mouth Daily. - Oral    sodium chloride 0.9 % flush 1-10 mL 1-10 mL As Needed 2/28/2017     Sig - Route: Infuse 1-10 mL into a venous catheter As Needed for line care. - Intravenous    sodium chloride 0.9 % flush 10 mL 10 mL As Needed 2/28/2017     Sig - Route: Infuse 10 mL into a venous catheter As Needed for line care. - Intravenous    sodium chloride 0.9 % infusion 100 mL/hr Continuous 2/28/2017     Sig - Route: " Infuse 100 mL/hr into a venous catheter Continuous. - Intravenous    oxyCODONE-acetaminophen (PERCOCET) 7.5-325 MG per tablet 1 tablet (Discontinued) 1 tablet Every 4 Hours PRN 2/28/2017 3/3/2017    Sig - Route: Take 1 tablet by mouth Every 4 (Four) Hours As Needed for moderate pain (4-6). - Oral             Physician Progress Notes (last 72 hours) (Notes from 2/28/2017  3:22 PM through 3/3/2017  3:22 PM)      Kelly Delarosa MD at 3/1/2017  4:15 PM  Version 1 of 1         hospitalist  Active Problems:    Lower GI bleed        Subjective: Still some right lower abdominal pain.  No nausea or vomiting.  Had bowel movement which was black.  No dizziness.  Continued generalized weakness.  No chest pain or shortness of breath.  No other new complaints.  Multiple family members are present at bedside.  Spoke with nursing staff.        Review of Systems:    Review of Systems   Constitutional: Negative for chills and fever. other systems reviewed and are negative.      Objective     Vital Signs:  Temp:  [96.3 °F (35.7 °C)-97.5 °F (36.4 °C)] 96.3 °F (35.7 °C)  Heart Rate:  [68-93] 68  Resp:  [16-18] 18  BP: (120-153)/(59-70) 130/61    Physical Exam:   Physical Exam   Constitutional: She is oriented to person, place, and time. She appears well-developed and well-nourished. No distress.   HENT:   Mouth/Throat: Oropharynx is clear and moist.   Eyes: Conjunctivae are normal. No scleral icterus.   Neck: Neck supple. No JVD present. No thyromegaly present.   Cardiovascular: Normal rate, regular rhythm and normal heart sounds.    Pulmonary/Chest: Effort normal and breath sounds normal. No respiratory distress.   Abdominal: Soft. She exhibits no distension. There is tenderness in the right lower quadrant. There is no rebound and no guarding.       Musculoskeletal: Normal range of motion. She exhibits no edema.   Neurological: She is alert and oriented to person, place, and time. No cranial nerve deficit.   Skin: Skin is warm  and dry. No rash noted. She is not diaphoretic. No pallor.   Psychiatric: She has a normal mood and affect. Her behavior is normal.          Results Review:       Lab Results (last 24 hours)     Procedure Component Value Units Date/Time    Influenza Antigen [[id number]]  (Normal) Collected:  02/28/17 1758    Specimen:  Swab from Nasopharynx Updated:  02/28/17 1822     Influenza A Ag, EIA Negative      Influenza B Ag, EIA Negative     Lactic Acid, Plasma [[id number]]  (Normal) Collected:  02/28/17 1827    Specimen:  Blood Updated:  02/28/17 1845     Lactate 1.0 mmol/L     Comprehensive Metabolic Panel [[id number]]  (Abnormal) Collected:  02/28/17 1827    Specimen:  Blood Updated:  02/28/17 1846     Glucose 90 mg/dL      BUN 25 (H) mg/dL      Creatinine 0.75 mg/dL      Sodium 130 (L) mmol/L      Potassium 3.1 (L) mmol/L      Chloride 92 (L) mmol/L      CO2 27.0 mmol/L      Calcium 8.7 mg/dL      Total Protein 5.4 (L) g/dL      Albumin 3.30 (L) g/dL      ALT (SGPT) 30 U/L      AST (SGOT) 19 U/L      Alkaline Phosphatase 88 U/L      Total Bilirubin 0.6 mg/dL      eGFR Non African Amer 75 mL/min/1.73      Globulin 2.1 (L) gm/dL      A/G Ratio 1.6 g/dL      BUN/Creatinine Ratio 33.3 (H)      Anion Gap 11.0 mmol/L     Narrative:       The MDRD GFR formula is only valid for adults with stable renal function between ages 18 and 70.    Protime-INR [[id number]]  (Abnormal) Collected:  02/28/17 1827    Specimen:  Blood Updated:  02/28/17 1853     Protime 15.7 (H) Seconds      INR 1.26 (H)     Narrative:       Therapeutic range for most indications is 2.0-3.0 INR,  or 2.5-3.5 for mechanical heart valves.    CBC Auto Differential [[id number]]  (Abnormal) Collected:  02/28/17 1827    Specimen:  Blood Updated:  02/28/17 1854     WBC 11.41 (H) 10*3/mm3      RBC 3.88 10*6/mm3      Hemoglobin 10.8 (L) g/dL      Hematocrit 31.6 (L) %      MCV 81.4 fL      MCH 27.8 pg      MCHC 34.2 g/dL      RDW 14.9 (H) %      RDW-SD 44.2 fl      MPV 8.9 fL       Platelets 206 10*3/mm3      Neutrophil % 77.5 %      Lymphocyte % 7.4 (L) %      Monocyte % 13.5 (H) %      Eosinophil % 1.1 %      Basophil % 0.1 %      Immature Grans % 0.4 %      Neutrophils, Absolute 8.85 (H) 10*3/mm3      Lymphocytes, Absolute 0.84 10*3/mm3      Monocytes, Absolute 1.54 (H) 10*3/mm3      Eosinophils, Absolute 0.13 10*3/mm3      Basophils, Absolute 0.01 10*3/mm3      Immature Grans, Absolute 0.04 (H) 10*3/mm3     Troponin [[id number]]  (Normal) Collected:  02/28/17 1827    Specimen:  Blood Updated:  02/28/17 1857     Troponin I <0.012 ng/mL     CBC & Differential [[id number]] Collected:  02/28/17 1827    Specimen:  Blood Updated:  02/28/17 2200    Narrative:       The following orders were created for panel order CBC & Differential.  Procedure                               Abnormality         Status                     ---------                               -----------         ------                     Scan Slide[[id number]]                                        Final result               CBC Auto Differential[[id number]]         Abnormal            Final result                 Please view results for these tests on the individual orders.    Scan Slide [[id number]] Collected:  02/28/17 1827    Specimen:  Blood Updated:  02/28/17 2200     Anisocytosis Slight/1+      Hypochromia Slight/1+      WBC Morphology Normal      Platelet Estimate Adequate     [place] Draw [[id number]] Collected:  02/28/17 1827    Specimen:  Blood Updated:  02/28/17 2302    Narrative:       The following orders were created for panel order [place] Draw.  Procedure                               Abnormality         Status                     ---------                               -----------         ------                     Light Blue Top[[id number]]                                    Final result               Green Top (Gel)[[id number]]                                   Final result               Lavender Top[[id number]]                                       Final result               Gold Top - SST[[id number]]                                    Final result                 Please view results for these tests on the individual orders.    Light Blue Top [[id number]] Collected:  02/28/17 1827    Specimen:  Blood Updated:  02/28/17 2302     Extra Tube hold for add-on       Auto resulted       Green Top (Gel) [[id number]] Collected:  02/28/17 1827    Specimen:  Blood Updated:  02/28/17 2302     Extra Tube Hold for add-ons.       Auto resulted.       Lavender Top [[id number]] Collected:  02/28/17 1827    Specimen:  Blood Updated:  02/28/17 2302     Extra Tube hold for add-on       Auto resulted       Gold Top - SST [[id number]] Collected:  02/28/17 1827    Specimen:  Blood Updated:  02/28/17 2302     Extra Tube Hold for add-ons.       Auto resulted.       Hemoglobin & Hematocrit, Blood [[id number]]  (Abnormal) Collected:  03/01/17 0014    Specimen:  Blood Updated:  03/01/17 0046     Hemoglobin 9.3 (L) g/dL      Hematocrit 27.2 (L) %     Comprehensive Metabolic Panel [[id number]]  (Abnormal) Collected:  03/01/17 0014    Specimen:  Blood Updated:  03/01/17 0102     Glucose 97 mg/dL      BUN 20 mg/dL      Creatinine 0.70 mg/dL      Sodium 129 (L) mmol/L      Potassium 3.3 (L) mmol/L      Chloride 98 mmol/L      CO2 26.0 mmol/L      Calcium 8.2 (L) mg/dL      Total Protein 5.3 (L) g/dL      Albumin 2.90 (L) g/dL      ALT (SGPT) 33 U/L      AST (SGOT) 19 U/L      Alkaline Phosphatase 73 U/L      Total Bilirubin 0.6 mg/dL      eGFR Non African Amer 82 mL/min/1.73      Globulin 2.4 gm/dL      A/G Ratio 1.2 g/dL      BUN/Creatinine Ratio 28.6 (H)      Anion Gap 5.0 mmol/L     Narrative:       The MDRD GFR formula is only valid for adults with stable renal function between ages 18 and 70.    CBC Auto Differential [[id number]]  (Abnormal) Collected:  03/01/17 0014    Specimen:  Blood Updated:  03/01/17 0103     WBC 8.19 10*3/mm3      RBC 3.34 (L) 10*6/mm3      Hemoglobin 9.3  (L) g/dL      Hematocrit 27.2 (L) %      MCV 81.4 fL      MCH 27.8 pg      MCHC 34.2 g/dL      RDW 15.3 (H) %      RDW-SD 45.5 fl      MPV 8.5 fL      Platelets 193 10*3/mm3      Neutrophil % 67.6 %      Lymphocyte % 16.1 %      Monocyte % 14.2 (H) %      Eosinophil % 1.5 %      Basophil % 0.1 %      Immature Grans % 0.5 %      Neutrophils, Absolute 5.54 10*3/mm3      Lymphocytes, Absolute 1.32 10*3/mm3      Monocytes, Absolute 1.16 (H) 10*3/mm3      Eosinophils, Absolute 0.12 10*3/mm3      Basophils, Absolute 0.01 10*3/mm3      Immature Grans, Absolute 0.04 (H) 10*3/mm3     TSH [[id number]]  (Abnormal) Collected:  03/01/17 0014    Specimen:  Blood Updated:  03/01/17 0132     TSH 28.800 (H) mIU/mL     Urinalysis With / Culture If Indicated [[id number]]  (Abnormal) Collected:  03/01/17 0455    Specimen:  Urine from Urine, Clean Catch Updated:  03/01/17 0552     Color, UA Dark Yellow      Appearance, UA Clear      pH, UA 6.0      Specific Gravity, UA 1.028       Result obtained by Refractometer        Glucose, UA Negative      Ketones, UA Negative      Bilirubin, UA Small (1+) (A)      Blood, UA Negative      Protein, UA Negative      Leuk Esterase, UA Moderate (2+) (A)      Nitrite, UA Negative      Urobilinogen, UA 1.0 E.U./dL     Urinalysis, Microscopic Only [[id number]]  (Abnormal) Collected:  03/01/17 0455    Specimen:  Urine from Urine, Clean Catch Updated:  03/01/17 0552     RBC, UA 0-2 (A) /HPF      WBC, UA Too Numerous to Count (A) /HPF      Bacteria, UA None Seen /HPF      Squamous Epithelial Cells, UA 3-5 (A) /HPF      Hyaline Casts, UA 13-20 /LPF      Methodology Automated Microscopy     Urine Culture [[id number]]  (Normal) Collected:  03/01/17 0455    Specimen:  Urine from Urine, Clean Catch Updated:  03/01/17 0615     Urine Culture Culture in progress     Blood Culture [[id number]]  (Normal) Collected:  02/28/17 1827    Specimen:  Blood from Arm, Left Updated:  03/01/17 0701     Blood Culture No growth at less  than 24 hours     Blood Culture [[id number]]  (Normal) Collected:  02/28/17 1827    Specimen:  Blood from Arm, Left Updated:  03/01/17 0701     Blood Culture No growth at less than 24 hours     Hemoglobin & Hematocrit, Blood [[id number]]  (Abnormal) Collected:  03/01/17 0736    Specimen:  Blood Updated:  03/01/17 0759     Hemoglobin 9.3 (L) g/dL      Hematocrit 27.5 (L) %         Imaging Results (last 24 hours)     Procedure Component Value Units Date/Time    XR Chest 1 View [[id number]] Collected:  02/28/17 1906     Updated:  02/28/17 1910    Narrative:       Patient Name:  GRETCHEN LYON  Patient ID:  [id number]   Ordering:  KAREN HOLLIDAY  Attending:  KAREN HOLLIDAY  Referring:  KAREN HOLLIDAY  ------------------------------------------------  PROCEDURE: XR CHEST 1 VIEW    INDICATION FOR PROCEDURE:  77 years -old patient presents for  evaluation of a simple sepsis protocol.    COMPARISON:  Chest radiograph dated August 3, 2015    FINDINGS: XR CHEST 1 view  reveals the lungs are expanded.  There  is mild elevation of the right hemidiaphragm. Patient has  right-sided Mediport catheter with the tip of the catheter in the  superior vena cava. Cardiac monitoring leads are present.    There is atherosclerotic calcification of the thoracic aorta.  Thoracic aorta is mildly tortuous. There is no radiographic  evidence for airspace consolidation, pleural effusion or  pneumothorax. Mediastinal and cardiac silhouettes are within  normal limits.     The skeletal structures are within normal limits.       Impression:       No radiographic evidence of acute cardiopulmonary  disease.    Electronically signed by:  Rashmi Longroia MD  2/28/2017 7:09 PM [employer]  Workstation: [email or web address]            Medication Review: medications have been reviewed    Assessment/Plan:  1. Colon cancer with metastases: Will consult Dr. Booth for input.   2.  Hypothyroidism: Patient's TSH is 19 as of last checked.  Now it is 28 .This may be contributing to her  generalized weakness.   -Increase dose of levothyroxine  3. Chronic anemia: Secondary to chronic rectal bleed. Will obtain serial H&H.  Monitor for trend.     -When necessary GI consultation if needed, we will decide after Dr. Booth's input.  4.  Generalized weakness with functional decline  -Consult case management for discharge planning  5 hypokalemia  -Replace potassium  6 hyponatremia, mild  -Monitor  DVT prophylaxis: SCD, no anticoagulation due to GI bleeding            Kelly Delarosa MD  03/01/17  4:15 PM             Electronically signed by Kelly Delarosa MD at 3/1/2017  4:20 PM      Kelly Delarosa MD at 3/2/2017  9:55 AM  Version 2 of 2         hospitalist  Active Problems:    Lower GI bleed        Subjective: Mild right lower abdominal pain.  Eating good but lot of gas.  No nausea or vomiting.  Had bowel movement which was lighter than when she came in.  No dizziness.  Continued generalized weakness but able to get up on her own.  Also walked with physical therapy..  No chest pain or shortness of breath.  No other new complaints.family member is present at bedside.  Spoke with nursing staff.  Oncology consult and help appreciated.      Review of Systems:    Review of Systems   Constitutional: Negative for chills and fever. other systems reviewed and are negative.      Objective     Vital Signs:  Temp:  [96.9 °F (36.1 °C)-97.5 °F (36.4 °C)] 97.1 °F (36.2 °C)  Heart Rate:  [66-78] 67  Resp:  [18] 18  BP: (118-167)/(56-70) 131/59    Physical Exam:   Physical Exam   Constitutional: She is oriented to person, place, and time. She appears well-developed and well-nourished. No distress.   HENT:   Mouth/Throat: Oropharynx is clear and moist.   Eyes: Conjunctivae are normal. No scleral icterus.   Neck: Neck supple. No JVD present. No thyromegaly present.   Cardiovascular: Normal rate, regular rhythm and normal heart sounds.    Pulmonary/Chest: Effort normal and breath sounds normal. No  respiratory distress.   Abdominal: Soft. She exhibits no distension. There is tenderness in the right lower quadrant. There is no rebound and no guarding.       Musculoskeletal: Normal range of motion. She exhibits no edema.   Neurological: She is alert and oriented to person, place, and time. No cranial nerve deficit.   Skin: Skin is warm and dry. No rash noted. She is not diaphoretic. No pallor.   Psychiatric: She has a normal mood and affect. Her behavior is normal.          Results Review:       Lab Results (last 24 hours)     Procedure Component Value Units Date/Time    Blood Culture [[id number]]  (Normal) Collected:  02/28/17 1827    Specimen:  Blood from Arm, Left Updated:  03/01/17 1901     Blood Culture No growth at 24 hours     Blood Culture [[id number]]  (Normal) Collected:  02/28/17 1827    Specimen:  Blood from Arm, Left Updated:  03/01/17 1901     Blood Culture No growth at 24 hours     Hemoglobin & Hematocrit, Blood [[id number]]  (Abnormal) Collected:  03/01/17 1956    Specimen:  Blood Updated:  03/01/17 2050     Hemoglobin 8.7 (L) g/dL      Hematocrit 26.0 (L) %     Urine Culture [[id number]] Collected:  03/01/17 0455    Specimen:  Urine from Urine, Clean Catch Updated:  03/02/17 0600     Urine Culture Mixed Culture     Narrative:         Specimen contains mixed organisms of questionable pathogenicity which indicates contamination with commensal caitlyn.  Further identification is unlikely to provide clinically useful information.  Suggest recollection.    CBC & Differential [[id number]] Collected:  03/02/17 0635    Specimen:  Blood Updated:  03/02/17 0721    Narrative:       The following orders were created for panel order CBC & Differential.  Procedure                               Abnormality         Status                     ---------                               -----------         ------                     CBC Auto Differential[[id number]]         Abnormal            Final result                 Please  view results for these tests on the individual orders.    CBC Auto Differential [[id number]]  (Abnormal) Collected:  03/02/17 0635    Specimen:  Blood Updated:  03/02/17 0721     WBC 6.66 10*3/mm3      RBC 3.07 (L) 10*6/mm3      Hemoglobin 8.7 (L) g/dL      Hematocrit 25.4 (L) %      MCV 82.7 fL      MCH 28.3 pg      MCHC 34.3 g/dL      RDW 15.1 (H) %      RDW-SD 45.2 fl      MPV 9.0 fL      Platelets 207 10*3/mm3      Neutrophil % 66.2 %      Lymphocyte % 17.0 %      Monocyte % 11.9 %      Eosinophil % 3.8 %      Basophil % 0.5 %      Immature Grans % 0.6 (H) %      Neutrophils, Absolute 4.42 10*3/mm3      Lymphocytes, Absolute 1.13 10*3/mm3      Monocytes, Absolute 0.79 10*3/mm3      Eosinophils, Absolute 0.25 10*3/mm3      Basophils, Absolute 0.03 10*3/mm3      Immature Grans, Absolute 0.04 (H) 10*3/mm3     Comprehensive Metabolic Panel [[id number]]  (Abnormal) Collected:  03/02/17 0635    Specimen:  Blood Updated:  03/02/17 0800     Glucose 82 mg/dL      BUN 9 mg/dL      Creatinine 0.64 mg/dL      Sodium 135 (L) mmol/L      Potassium 4.0 mmol/L      Chloride 103 mmol/L      CO2 24.0 mmol/L      Calcium 8.4 mg/dL      Total Protein 4.9 (L) g/dL      Albumin 2.90 (L) g/dL      ALT (SGPT) 28 U/L      AST (SGOT) 15 U/L      Alkaline Phosphatase 66 U/L      Total Bilirubin 0.3 mg/dL      eGFR Non African Amer 90 mL/min/1.73      Globulin 2.0 (L) gm/dL      A/G Ratio 1.5 g/dL      BUN/Creatinine Ratio 14.1      Anion Gap 8.0 mmol/L     Narrative:       The MDRD GFR formula is only valid for adults with stable renal function between ages 18 and 70.    Iron Profile [[id number]]  (Abnormal) Collected:  03/02/17 0635    Specimen:  Blood Updated:  03/02/17 0833     Iron 16 (L) mcg/dL      TIBC 249 (L) mcg/dL      Iron Saturation 6 (L) %     Ferritin [[id number]]  (Normal) Collected:  03/02/17 0635    Specimen:  Blood Updated:  03/02/17 0858     Ferritin 51.20 ng/mL     Folate [[id number]]  (Normal) Collected:  03/02/17 0635     Specimen:  Blood Updated:  03/02/17 0928     Folate 9.95 ng/mL     Vitamin B12 [[id number]]  (Normal) Collected:  03/02/17 0635    Specimen:  Blood Updated:  03/02/17 0928     Vitamin B-12 341 pg/mL         Imaging Results (last 24 hours)     Procedure Component Value Units Date/Time    XR Chest 1 View [[id number]] Collected:  02/28/17 1906     Updated:  02/28/17 1910    Narrative:       Patient Name:  GRETCHEN LYON  Patient ID:  [id number]   Ordering:  KAREN HOLLIDAY  Attending:  KAREN HOLLIDAY  Referring:  KAREN HOLLIDAY  ------------------------------------------------  PROCEDURE: XR CHEST 1 VIEW    INDICATION FOR PROCEDURE:  77 years -old patient presents for  evaluation of a simple sepsis protocol.    COMPARISON:  Chest radiograph dated August 3, 2015    FINDINGS: XR CHEST 1 view  reveals the lungs are expanded.  There  is mild elevation of the right hemidiaphragm. Patient has  right-sided Mediport catheter with the tip of the catheter in the  superior vena cava. Cardiac monitoring leads are present.    There is atherosclerotic calcification of the thoracic aorta.  Thoracic aorta is mildly tortuous. There is no radiographic  evidence for airspace consolidation, pleural effusion or  pneumothorax. Mediastinal and cardiac silhouettes are within  normal limits.     The skeletal structures are within normal limits.       Impression:       No radiographic evidence of acute cardiopulmonary  disease.    Electronically signed by:  Rashmi Longoria MD  2/28/2017 7:09 PM [facility]  Workstation: [id number]            Medication Review: medications have been reviewed    Assessment/Plan:  1. Colon cancer with metastases:   Consulted Dr. Booth  -CT scan of chest and abdomen ordered, result pending  -Treatment plan as per Dr. Booth   2.  Hypothyroidism: Patient's TSH is 19 as of last checked.  Now it is 28 .This may be contributing to her generalized weakness.   -Increased dose of levothyroxine  3. Chronic anemia: Secondary to chronic rectal  bleed.  -Anemia little worse, but stable   -When necessary GI consultation if needed, we will decide after Dr. Alvarez's input.  4.  Generalized weakness with functional decline  -Consult case management for discharge planning, probably home with home health  5 hypokalemia  -Replace potassium  6 hyponatremia, mild  -Monitor  DVT prophylaxis: SCD, no anticoagulation due to GI bleeding            Kelly Delarosa MD  03/02/17  9:55 AM        Imaging Results (last 24 hours)     Procedure Component Value Units Date/Time    CT Chest With Contrast [[id number]] Collected:  03/02/17 1012     Updated:  03/02/17 1026    Narrative:         [facility]    Patient Name: GRETCHEN LYON    ORDERING: HEIDI ALVAREZ    ATTENDING: DARRYN GOMEZ     REFERRING: HEIDI ALVAREZ  -----------------------    PROCEDURE: CT chest, abdomen and pelvis with contrast on 3/1/2017    CLINICAL INDICATION:  Shortness of breath, rectal bleeding,  generalized abdominal pain, history of metastatic colon cancer    TECHNIQUE: Multiple axial images are obtained throughout the  chest, abdomen and pelvis following the administration of IV and  oral contrast.   Total DLP is 744.6 mGy*cm.    COMPARISON: 1/12/2017     FINDINGS:     CHEST: Coronary artery calcifications and other vascular  calcifications are noted. There is evidence of calcified  granulomatous disease in the chest. There are mild areas of  linear atelectasis or scarring bilaterally. The lungs are  otherwise clear. There is no pleural or pericardial effusion.  There is no thoracic adenopathy. No acute bony abnormality of the  thorax is noted.    ABDOMEN: There has been significant worsening of subcutaneous,  peritoneal and mesenteric peripherally enhancing centrally  necrotic metastatic masses. For example large subcutaneous mass  in the midline upper abdominal wall subcutaneous tissues now  measures up to 5.8 x 5.5 cm on image 95 where this previously  measured 3.1 x 2.6 cm.  Large mass along the rectus musculature  and anterior peritoneum on image 133 measures 4.5 x 3.7 cm and  previously measured 1.8 x 1.5 cm. Large mesenteric mass in the  midabdomen on image 149 measures 3.2 x 2.8 cm and previously  measured 1.4 x 1.2 cm. The patient is status post a right  hemicolectomy. Adjacent to the hemicolectomy site is a large  lobulated mass consistent with local recurrence which also has  increased in size and involves adjacent small bowel. On image 118  this measures up to approximately 5.0 x 4.9 cm. There is an  additional large mass along the greater curvature of the stomach  just inferior to the antrum seen best on coronal image 30 that  also has significantly increased in size. Peritoneal implant  along the posterior edge of the spleen has also increased in  size. The patient is status post cholecystectomy. Small right  renal cyst is noted. Solid abdominal organs are otherwise  unremarkable. Vascular calcifications are noted. There is no  retroperitoneal adenopathy. There is no free fluid or free air  within the abdomen.    Pelvis: The patient is status post hysterectomy. There has been  increase in size of left-sided retroperitoneal mass anterior to  the iliacus muscle on image 152 now measuring 2.1 x 1.7 cm and  previously measured 1.3 x 0.9 cm. Additional peritoneal implants  are noted in the pelvis with one adjacent to the sigmoid colon on  image 167. There is diverticulosis. There is no free fluid in the  pelvis. There is no pelvic adenopathy. No bony metastatic lesion  is noted.      Impression:       CONCLUSION:   1. Significant worsening metastatic disease in the abdomen and  pelvis with subcutaneous, mesenteric and peritoneal masses all  having increased in size. Local recurrence adjacent to the right  hemicolectomy site also has increased in size.  2. No acute abnormality in the chest.    Electronically signed by:  Oleg Valladares  3/2/2017 10:25 AM  [facility] Workstation:  [id number]    CT Abdomen Pelvis With Contrast [[id number]] Collected:  03/02/17 1012     Updated:  03/02/17 1026    Narrative:         [facility]    Patient Name: GRETCHEN LYON    ORDERING: HEIDI ALVAREZ    ATTENDING: DARRYN GOMEZ     REFERRING: HEIDI ALVAREZ  -----------------------    PROCEDURE: CT chest, abdomen and pelvis with contrast on 3/1/2017    CLINICAL INDICATION:  Shortness of breath, rectal bleeding,  generalized abdominal pain, history of metastatic colon cancer    TECHNIQUE: Multiple axial images are obtained throughout the  chest, abdomen and pelvis following the administration of IV and  oral contrast.   Total DLP is 744.6 mGy*cm.    COMPARISON: 1/12/2017     FINDINGS:     CHEST: Coronary artery calcifications and other vascular  calcifications are noted. There is evidence of calcified  granulomatous disease in the chest. There are mild areas of  linear atelectasis or scarring bilaterally. The lungs are  otherwise clear. There is no pleural or pericardial effusion.  There is no thoracic adenopathy. No acute bony abnormality of the  thorax is noted.    ABDOMEN: There has been significant worsening of subcutaneous,  peritoneal and mesenteric peripherally enhancing centrally  necrotic metastatic masses. For example large subcutaneous mass  in the midline upper abdominal wall subcutaneous tissues now  measures up to 5.8 x 5.5 cm on image 95 where this previously  measured 3.1 x 2.6 cm. Large mass along the rectus musculature  and anterior peritoneum on image 133 measures 4.5 x 3.7 cm and  previously measured 1.8 x 1.5 cm. Large mesenteric mass in the  midabdomen on image 149 measures 3.2 x 2.8 cm and previously  measured 1.4 x 1.2 cm. The patient is status post a right  hemicolectomy. Adjacent to the hemicolectomy site is a large  lobulated mass consistent with local recurrence which also has  increased in size and involves adjacent small bowel. On image 118  this measures up to  approximately 5.0 x 4.9 cm. There is an  additional large mass along the greater curvature of the stomach  just inferior to the antrum seen best on coronal image 30 that  also has significantly increased in size. Peritoneal implant  along the posterior edge of the spleen has also increased in  size. The patient is status post cholecystectomy. Small right  renal cyst is noted. Solid abdominal organs are otherwise  unremarkable. Vascular calcifications are noted. There is no  retroperitoneal adenopathy. There is no free fluid or free air  within the abdomen.    Pelvis: The patient is status post hysterectomy. There has been  increase in size of left-sided retroperitoneal mass anterior to  the iliacus muscle on image 152 now measuring 2.1 x 1.7 cm and  previously measured 1.3 x 0.9 cm. Additional peritoneal implants  are noted in the pelvis with one adjacent to the sigmoid colon on  image 167. There is diverticulosis. There is no free fluid in the  pelvis. There is no pelvic adenopathy. No bony metastatic lesion  is noted.      Impression:       CONCLUSION:   1. Significant worsening metastatic disease in the abdomen and  pelvis with subcutaneous, mesenteric and peritoneal masses all  having increased in size. Local recurrence adjacent to the right  hemicolectomy site also has increased in size.  2. No acute abnormality in the chest.    Electronically signed by:  Oleg Valladares  3/2/2017 10:25 AM  [facility] Workstation: [id number]        Patient was just seen by Dr. Booth and I spoke with Dr. Booth and due to worsening of 4 overall cancer with very poor prognosis patient has decided to consult hospice services and comfort care.  She referral would be sent to hospice services.  Discharge planning according to family's decision which include hospice with home or hospice with nursing home placement.     Electronically signed by Kelly Delarosa MD at 3/2/2017  5:51 PM      Kelly Delarosa MD at 3/2/2017  9:55 AM   Version 1 of 2         hospitalist  Active Problems:    Lower GI bleed        Subjective: Mild right lower abdominal pain.  Eating good but lot of gas.  No nausea or vomiting.  Had bowel movement which was lighter than when she came in.  No dizziness.  Continued generalized weakness but able to get up on her own.  Also walked with physical therapy..  No chest pain or shortness of breath.  No other new complaints.family member is present at bedside.  Spoke with nursing staff.  Oncology consult and help appreciated.      Review of Systems:    Review of Systems   Constitutional: Negative for chills and fever. other systems reviewed and are negative.      Objective     Vital Signs:  Temp:  [96.9 °F (36.1 °C)-97.5 °F (36.4 °C)] 97.1 °F (36.2 °C)  Heart Rate:  [66-78] 67  Resp:  [18] 18  BP: (118-167)/(56-70) 131/59    Physical Exam:   Physical Exam   Constitutional: She is oriented to person, place, and time. She appears well-developed and well-nourished. No distress.   HENT:   Mouth/Throat: Oropharynx is clear and moist.   Eyes: Conjunctivae are normal. No scleral icterus.   Neck: Neck supple. No JVD present. No thyromegaly present.   Cardiovascular: Normal rate, regular rhythm and normal heart sounds.    Pulmonary/Chest: Effort normal and breath sounds normal. No respiratory distress.   Abdominal: Soft. She exhibits no distension. There is tenderness in the right lower quadrant. There is no rebound and no guarding.       Musculoskeletal: Normal range of motion. She exhibits no edema.   Neurological: She is alert and oriented to person, place, and time. No cranial nerve deficit.   Skin: Skin is warm and dry. No rash noted. She is not diaphoretic. No pallor.   Psychiatric: She has a normal mood and affect. Her behavior is normal.          Results Review:       Lab Results (last 24 hours)     Procedure Component Value Units Date/Time    Blood Culture [[id number]]  (Normal) Collected:  02/28/17 1827    Specimen:  Blood from  Arm, Left Updated:  03/01/17 1901     Blood Culture No growth at 24 hours     Blood Culture [[id number]]  (Normal) Collected:  02/28/17 1827    Specimen:  Blood from Arm, Left Updated:  03/01/17 1901     Blood Culture No growth at 24 hours     Hemoglobin & Hematocrit, Blood [[id number]]  (Abnormal) Collected:  03/01/17 1956    Specimen:  Blood Updated:  03/01/17 2050     Hemoglobin 8.7 (L) g/dL      Hematocrit 26.0 (L) %     Urine Culture [[id number]] Collected:  03/01/17 0455    Specimen:  Urine from Urine, Clean Catch Updated:  03/02/17 0600     Urine Culture Mixed Culture     Narrative:         Specimen contains mixed organisms of questionable pathogenicity which indicates contamination with commensal caitlyn.  Further identification is unlikely to provide clinically useful information.  Suggest recollection.    CBC & Differential [[id number]] Collected:  03/02/17 0635    Specimen:  Blood Updated:  03/02/17 0721    Narrative:       The following orders were created for panel order CBC & Differential.  Procedure                               Abnormality         Status                     ---------                               -----------         ------                     CBC Auto Differential[[id number]]         Abnormal            Final result                 Please view results for these tests on the individual orders.    CBC Auto Differential [[id number]]  (Abnormal) Collected:  03/02/17 0635    Specimen:  Blood Updated:  03/02/17 0721     WBC 6.66 10*3/mm3      RBC 3.07 (L) 10*6/mm3      Hemoglobin 8.7 (L) g/dL      Hematocrit 25.4 (L) %      MCV 82.7 fL      MCH 28.3 pg      MCHC 34.3 g/dL      RDW 15.1 (H) %      RDW-SD 45.2 fl      MPV 9.0 fL      Platelets 207 10*3/mm3      Neutrophil % 66.2 %      Lymphocyte % 17.0 %      Monocyte % 11.9 %      Eosinophil % 3.8 %      Basophil % 0.5 %      Immature Grans % 0.6 (H) %      Neutrophils, Absolute 4.42 10*3/mm3      Lymphocytes, Absolute 1.13 10*3/mm3      Monocytes,  Absolute 0.79 10*3/mm3      Eosinophils, Absolute 0.25 10*3/mm3      Basophils, Absolute 0.03 10*3/mm3      Immature Grans, Absolute 0.04 (H) 10*3/mm3     Comprehensive Metabolic Panel [[id number]]  (Abnormal) Collected:  03/02/17 0635    Specimen:  Blood Updated:  03/02/17 0800     Glucose 82 mg/dL      BUN 9 mg/dL      Creatinine 0.64 mg/dL      Sodium 135 (L) mmol/L      Potassium 4.0 mmol/L      Chloride 103 mmol/L      CO2 24.0 mmol/L      Calcium 8.4 mg/dL      Total Protein 4.9 (L) g/dL      Albumin 2.90 (L) g/dL      ALT (SGPT) 28 U/L      AST (SGOT) 15 U/L      Alkaline Phosphatase 66 U/L      Total Bilirubin 0.3 mg/dL      eGFR Non African Amer 90 mL/min/1.73      Globulin 2.0 (L) gm/dL      A/G Ratio 1.5 g/dL      BUN/Creatinine Ratio 14.1      Anion Gap 8.0 mmol/L     Narrative:       The MDRD GFR formula is only valid for adults with stable renal function between ages 18 and 70.    Iron Profile [[id number]]  (Abnormal) Collected:  03/02/17 0635    Specimen:  Blood Updated:  03/02/17 0833     Iron 16 (L) mcg/dL      TIBC 249 (L) mcg/dL      Iron Saturation 6 (L) %     Ferritin [[id number]]  (Normal) Collected:  03/02/17 0635    Specimen:  Blood Updated:  03/02/17 0858     Ferritin 51.20 ng/mL     Folate [[id number]]  (Normal) Collected:  03/02/17 0635    Specimen:  Blood Updated:  03/02/17 0928     Folate 9.95 ng/mL     Vitamin B12 [[id number]]  (Normal) Collected:  03/02/17 0635    Specimen:  Blood Updated:  03/02/17 0928     Vitamin B-12 341 pg/mL         Imaging Results (last 24 hours)     Procedure Component Value Units Date/Time    XR Chest 1 View [[id number]] Collected:  02/28/17 1906     Updated:  02/28/17 1910    Narrative:       Patient Name:  GRETCHEN LYON  Patient ID:  [id number]   Ordering:  KAREN HOLLIDAY  Attending:  KAREN HOLLIDAY  Referring:  KAREN HOLLIDAY  ------------------------------------------------  PROCEDURE: XR CHEST 1 VIEW    INDICATION FOR PROCEDURE:  77 years -old patient presents  for  evaluation of a simple sepsis protocol.    COMPARISON:  Chest radiograph dated August 3, 2015    FINDINGS: XR CHEST 1 view  reveals the lungs are expanded.  There  is mild elevation of the right hemidiaphragm. Patient has  right-sided Mediport catheter with the tip of the catheter in the  superior vena cava. Cardiac monitoring leads are present.    There is atherosclerotic calcification of the thoracic aorta.  Thoracic aorta is mildly tortuous. There is no radiographic  evidence for airspace consolidation, pleural effusion or  pneumothorax. Mediastinal and cardiac silhouettes are within  normal limits.     The skeletal structures are within normal limits.       Impression:       No radiographic evidence of acute cardiopulmonary  disease.    Electronically signed by:  Rashmi Longoria MD  2/28/2017 7:09 PM CST  Workstation: [id number]            Medication Review: medications have been reviewed    Assessment/Plan:  1. Colon cancer with metastases:   Consulted Dr. Booth  -CT scan of chest and abdomen ordered, result pending  -Treatment plan as per Dr. Booth   2.  Hypothyroidism: Patient's TSH is 19 as of last checked.  Now it is 28 .This may be contributing to her generalized weakness.   -Increased dose of levothyroxine  3. Chronic anemia: Secondary to chronic rectal bleed.  -Anemia little worse, but stable   -When necessary GI consultation if needed, we will decide after Dr. Booth's input.  4.  Generalized weakness with functional decline  -Consult case management for discharge planning, probably home with home health  5 hypokalemia  -Replace potassium  6 hyponatremia, mild  -Monitor  DVT prophylaxis: SCD, no anticoagulation due to GI bleeding            Kelly Delarosa MD  03/02/17  9:55 AM             Electronically signed by Kelly Delarosa MD at 3/2/2017  9:58 AM      Irving Booth MD at 3/2/2017  5:42 PM  Version 1 of 1               HISTORY OF PRESENT ILLNESS:    Patient is feeling little better.   Complains of multiple bowel movements since last night after drinking contrast for the key CT scan.  Complains of pain in the abdominal area.  Denies any fever or chills.      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 6 of 24 lymph nodes positive   • Hordeolum    • Hyperlipidemia    • Hypertension    • Peripheral venous insufficiency    • Posterior subcapsular polar senile cataract      Left   • Stricture of esophagus    • URI (upper respiratory infection)    • UTI (urinary tract infection)    • Visual discomfort      Left       PAST SURGICAL HISTORY:  Past Surgical History   Procedure Laterality Date   •  section     • Hysterectomy     • Carpal tunnel release  2014     Left carpal tunnel release   • Cholecystectomy  2016     Laparoscopic converted to open hemicolectomy. Right colon cancer   • Endoscopy and colonoscopy  2015     Internal & external hemorrhoids found. Diverticulum in sigmoid colon & descending colon   • Colonoscopy  02/10/2016     No specimens collected. Mild diverticulosis in the sigmoid colon.Malignant appearing tumor in the colon. Biopsied   • Esophagoscopy / egd  02/10/2016     With biopsy-Benign appearing esophageal stenosis.Chronic gastritis.Biopsied.Normal examined duodenum   • Esophagoscopy / egd  2015     with tube-Esophageal stricture present. Dilatation performed. Gastritis in stomach. Biopsy taken. Normal duodenum   • Venous access device (port) insertion  03/10/2016     Ultrasound guided right Mediport placement under fluoroscopy   • Cataract  extraction  10/15/2015     Cataract extraction with intraocular lens implantation, left eye       ALLERGIES:    Allergies   Allergen Reactions   • Latex    • Adhesive Tape Rash       SOCIAL HISTORY:   Social History   Substance Use Topics   • Smoking status: Never Smoker   • Smokeless tobacco: Never Used   • Alcohol use No       CURRENT MEDICATIONS:    Current Facility-Administered Medications   Medication Dose Route Frequency Provider Last Rate Last Dose   • aspirin EC tablet 81 mg  81 mg Oral Every Other Day Javier Peña MD   81 mg at 03/02/17 0833   • atorvastatin (LIPITOR) tablet 20 mg  20 mg Oral Q PM Javier Peña MD   20 mg at 03/02/17 1734   • diltiaZEM CD (CARDIZEM CD) 24 hr capsule 120 mg  120 mg Oral Q24H Javier Peña MD   120 mg at 03/02/17 0834   • escitalopram (LEXAPRO) tablet 10 mg  10 mg Oral Daily Javier Peña MD   10 mg at 03/02/17 0833   • fentaNYL (DURAGESIC) 12 MCG/HR 1 patch  1 patch Transdermal Q72H Irving Booth MD   1 patch at 03/02/17 1734   • hydrochlorothiazide (HYDRODIURIL) tablet 25 mg  25 mg Oral Daily Javier Peña MD   25 mg at 03/02/17 0834   • labetalol (NORMODYNE) tablet 100 mg  100 mg Oral BID Javier Peña MD   100 mg at 03/02/17 1734   • levothyroxine (SYNTHROID, LEVOTHROID) tablet 125 mcg  125 mcg Oral Q AM Kelly Delarosa MD   125 mcg at 03/02/17 0632   • megestrol (MEGACE) 40 MG/ML suspension 400 mg  400 mg Oral Daily Javier Peña MD   400 mg at 03/02/17 0833   • Morphine sulfate (PF) injection 1 mg  1 mg Intravenous Q4H PRN Javier Peña MD   1 mg at 02/28/17 2252    And   • naloxone (NARCAN) injection 0.4 mg  0.4 mg Intravenous Q5 Min PRN Javier Peña MD       • ondansetron (ZOFRAN) tablet 4 mg  4 mg Oral 4x Daily PRN Javier Peña MD   4 mg at 03/02/17 1031   • oxyCODONE (ROXICODONE) immediate release tablet 5 mg  5 mg Oral Q8H PRN Javier Peña MD       • oxyCODONE-acetaminophen (PERCOCET) 7.5-325 MG per tablet 1 tablet  1  tablet Oral Q4H PRN Javier Peña MD   1 tablet at 03/02/17 1625   • pantoprazole (PROTONIX) EC tablet 40 mg  40 mg Oral Daily Javier Peña MD   40 mg at 03/02/17 0834   • sodium chloride 0.9 % flush 1-10 mL  1-10 mL Intravenous PRN Javier Peña MD       • sodium chloride 0.9 % flush 10 mL  10 mL Intravenous PRN Jeff Wilder MD       • sodium chloride 0.9 % infusion  100 mL/hr Intravenous Continuous Javier Peña  mL/hr at 03/02/17 1417 100 mL/hr at 03/02/17 1417            FAMILY HISTORY:    Family History   Problem Relation Age of Onset   • Diabetes Mother    • Heart disease Mother    • Hyperlipidemia Mother    • Vision loss Mother    • Arthritis Mother    • Diabetes Father    • Heart disease Father    • Hyperlipidemia Father    • Vision loss Father    • Stroke Father    • Cancer Sister        REVIEW OF SYSTEMS:      CONSTITUTIONAL:  Complains of fatigue.  Complains generalized weakness.  Complains of weight loss and poor appetite.  Denies any fever, chills.    HEENT:  No epistaxis, mouth sores or difficulty swallowing.    RESPIRATORY:  No new shortness of breath. No new cough or hemoptysis.    CARDIOVASCULAR:  No chest pain or palpitations.    GASTROINTESTINAL: Complains of abdominal pain at the site of lump in anterior abdominal wall.  Complains of blood in the stool or dark stool for last 4-5 months.  Complains of intermittent nausea.  Complains of multiple loose bowel movements since yesterday due to contrast from CT scan.     GENITOURINARY: No Dysuria or Hematuria.    MUSCULOSKELETAL:  Complains of chronic lower back pain.    LYMPHATICS:  Denies any abnormal swollen glands anywhere in the body.    NEUROLOGICAL : Positive for chronic tingling and numbness affecting upper extremity and lower extremity since FOLFOX chemotherapy. No headache or dizziness. No seizures or balance problems.    SKIN: Positive for lumps or anterior abdominal wall around surgical site.    PHYSICAL EXAMINATION:       VITAL SIGNS:  Temp:  [96.9 °F (36.1 °C)-97.5 °F (36.4 °C)] 97.4 °F (36.3 °C)  Heart Rate:  [66-78] 74  Resp:  [18] 18  BP: (131-167)/(59-70) 132/59    GENERAL:  Not in any distress.    HEENT:  Normocephalic, Atraumatic.conjunctivae  pallor present. No icterus. Extraocular Movements Intact. No Fascial Asymmetry noted.    NECK:  No adenopathy. NO JVD.    RESPIRATORY:  Fair air entry bilateral. No rhonchi or wheezing.    CARDIOVASCULAR:  S1, S2. Regular rate and rhythm. No murmur or gallop appreciated.    ABDOMEN:  Soft, obese, nontender.  2 lumps palpable along the midline surgical incision.  The upper one measures about 6 x 6 cm which has got bigger in recent past.  Bowel sounds present in all four quadrants.  No organomegaly appreciated.    EXTREMITIES:  No edema.No Calf Tenderness.    NEUROLOGIC:  Alert, awake and oriented ×3.          DIAGNOSTIC DATA:    WBC   Date Value Ref Range Status   03/02/2017 6.66 3.20 - 9.80 10*3/mm3 Final     RBC   Date Value Ref Range Status   03/02/2017 3.07 (L) 3.77 - 5.16 10*6/mm3 Final     HEMOGLOBIN   Date Value Ref Range Status   03/02/2017 8.7 (L) 12.0 - 15.5 g/dL Final     HEMATOCRIT   Date Value Ref Range Status   03/02/2017 25.4 (L) 35.0 - 45.0 % Final     MCV   Date Value Ref Range Status   03/02/2017 82.7 80.0 - 98.0 fL Final     MCH   Date Value Ref Range Status   03/02/2017 28.3 26.5 - 34.0 pg Final     MCHC   Date Value Ref Range Status   03/02/2017 34.3 31.4 - 36.0 g/dL Final     RDW   Date Value Ref Range Status   03/02/2017 15.1 (H) 11.5 - 14.5 % Final     RDW-SD   Date Value Ref Range Status   03/02/2017 45.2 36.4 - 46.3 fl Final     MPV   Date Value Ref Range Status   03/02/2017 9.0 8.0 - 12.0 fL Final     PLATELETS   Date Value Ref Range Status   03/02/2017 207 150 - 450 10*3/mm3 Final     NEUTROPHIL %   Date Value Ref Range Status   03/02/2017 66.2 37.0 - 80.0 % Final     LYMPHOCYTE %   Date Value Ref Range Status   03/02/2017 17.0 10.0 - 50.0 % Final      MONOCYTE %   Date Value Ref Range Status   03/02/2017 11.9 0.0 - 12.0 % Final     EOSINOPHIL %   Date Value Ref Range Status   03/02/2017 3.8 0.0 - 7.0 % Final     BASOPHIL %   Date Value Ref Range Status   03/02/2017 0.5 0.0 - 2.0 % Final     IMMATURE GRANS %   Date Value Ref Range Status   03/02/2017 0.6 (H) 0.0 - 0.5 % Final     NEUTROPHILS, ABSOLUTE   Date Value Ref Range Status   03/02/2017 4.42 2.00 - 8.60 10*3/mm3 Final     LYMPHOCYTES, ABSOLUTE   Date Value Ref Range Status   03/02/2017 1.13 0.60 - 4.20 10*3/mm3 Final     MONOCYTES, ABSOLUTE   Date Value Ref Range Status   03/02/2017 0.79 0.00 - 0.90 10*3/mm3 Final     EOSINOPHILS, ABSOLUTE   Date Value Ref Range Status   03/02/2017 0.25 0.00 - 0.70 10*3/mm3 Final     BASOPHILS, ABSOLUTE   Date Value Ref Range Status   03/02/2017 0.03 0.00 - 0.20 10*3/mm3 Final     IMMATURE GRANS, ABSOLUTE   Date Value Ref Range Status   03/02/2017 0.04 (H) 0.00 - 0.02 10*3/mm3 Final     GLUCOSE   Date Value Ref Range Status   03/02/2017 82 60 - 100 mg/dL Final     SODIUM   Date Value Ref Range Status   03/02/2017 135 (L) 137 - 145 mmol/L Final     POTASSIUM   Date Value Ref Range Status   03/02/2017 4.0 3.5 - 5.1 mmol/L Final     CO2   Date Value Ref Range Status   03/02/2017 24.0 22.0 - 31.0 mmol/L Final     CHLORIDE   Date Value Ref Range Status   03/02/2017 103 95 - 110 mmol/L Final     ANION GAP   Date Value Ref Range Status   03/02/2017 8.0 5.0 - 15.0 mmol/L Final     CREATININE   Date Value Ref Range Status   03/02/2017 0.64 0.50 - 1.00 mg/dL Final     BUN   Date Value Ref Range Status   03/02/2017 9 7 - 21 mg/dL Final     BUN/CREATININE RATIO   Date Value Ref Range Status   03/02/2017 14.1 7.0 - 25.0 Final     CALCIUM   Date Value Ref Range Status   03/02/2017 8.4 8.4 - 10.2 mg/dL Final     EGFR NON  AMER   Date Value Ref Range Status   03/02/2017 90 39 - 90 mL/min/1.73 Final     ALKALINE PHOSPHATASE   Date Value Ref Range Status   03/02/2017 66 38 - 126  U/L Final     TOTAL PROTEIN   Date Value Ref Range Status   03/02/2017 4.9 (L) 6.3 - 8.6 g/dL Final     ALT (SGPT)   Date Value Ref Range Status   03/02/2017 28 9 - 52 U/L Final     AST (SGOT)   Date Value Ref Range Status   03/02/2017 15 14 - 36 U/L Final     TOTAL BILIRUBIN   Date Value Ref Range Status   03/02/2017 0.3 0.2 - 1.3 mg/dL Final     ALBUMIN   Date Value Ref Range Status   03/02/2017 2.90 (L) 3.40 - 4.80 g/dL Final     GLOBULIN   Date Value Ref Range Status   03/02/2017 2.0 (L) 2.3 - 3.5 gm/dL Final     A/G RATIO   Date Value Ref Range Status   03/02/2017 1.5 1.1 - 1.8 g/dL Final     Lab Results   Component Value Date    IRON 16 (L) 03/02/2017    TIBC 249 (L) 03/02/2017    LABIRON 6 (L) 03/02/2017    FERRITIN 51.20 03/02/2017    HJEOTVQB08 341 03/02/2017    FOLATE 9.95 03/02/2017     Lab Results   Component Value Date    CEA 11.7 (H) 01/17/2017    REFLABREPO SEE NOTE: 02/10/2016     RADIOLOGY DATA:  CT of chest abdomen and pelvis with contrast done on March 1, 2017 shows:  ABDOMEN: There has been significant worsening of subcutaneous,  peritoneal and mesenteric peripherally enhancing centrally  necrotic metastatic masses. For example large subcutaneous mass  in the midline upper abdominal wall subcutaneous tissues now  measures up to 5.8 x 5.5 cm on image 95 where this previously  measured 3.1 x 2.6 cm. Large mass along the rectus musculature  and anterior peritoneum on image 133 measures 4.5 x 3.7 cm and  previously measured 1.8 x 1.5 cm. Large mesenteric mass in the  midabdomen on image 149 measures 3.2 x 2.8 cm and previously  measured 1.4 x 1.2 cm. The patient is status post a right  hemicolectomy. Adjacent to the hemicolectomy site is a large  lobulated mass consistent with local recurrence which also has  increased in size and involves adjacent small bowel. On image 118  this measures up to approximately 5.0 x 4.9 cm. There is an  additional large mass along the greater curvature of the  stomach  just inferior to the antrum seen best on coronal image 30 that  also has significantly increased in size. Peritoneal implant  along the posterior edge of the spleen has also increased in  size. The patient is status post cholecystectomy. Small right  renal cyst is noted. Solid abdominal organs are otherwise  unremarkable. Vascular calcifications are noted. There is no  retroperitoneal adenopathy. There is no free fluid or free air  within the abdomen.     Pelvis: The patient is status post hysterectomy. There has been  increase in size of left-sided retroperitoneal mass anterior to  the iliacus muscle on image 152 now measuring 2.1 x 1.7 cm and  previously measured 1.3 x 0.9 cm. Additional peritoneal implants  are noted in the pelvis with one adjacent to the sigmoid colon on  image 167. There is diverticulosis. There is no free fluid in the  pelvis. There is no pelvic adenopathy. No bony metastatic lesion  is noted.     IMPRESSION:  CONCLUSION:   1. Significant worsening metastatic disease in the abdomen and  pelvis with subcutaneous, mesenteric and peritoneal masses all  having increased in size. Local recurrence adjacent to the right  hemicolectomy site also has increased in size.  2. No acute abnormality in the chest.    ASSESSMENT AND PLAN:      1. Recurrent metastatic colon cancer. Patient initially was diagnosed with a stage III disease T4 N2 M0 in February 2016. Patient initially had a hemicolectomy followed by adjuvant FOLFOX 6 for 8 cycles last of which was in August 2016. Chemotherapy was held at that point due to worsening performance status side effects as well as recurrent hospital admission. In October 2016 she had a recurrence at the site of anastomosis for which Dr. Quesada did total colectomy. After that patient was seen at [facility] for a second opinion, due to PET scan being negative for any metastatic disease except for the site of recurrence the recommended close monitoring for  recurrence. In December 2016 around [place] she started feeling a lump around anterior abdominal wall next to the surgical incision for which Dr. Quesada did a CAT scan of chest abdomen and pelvis on January 12, 2017, it showed there was a recurrence and metastatic disease involving anterior abdominal wall mesentery as well as spleen and pelvic sidewall. For which patient was again seen by Dr. Peck at [facility] and recommendation was to start her on Opdivo due to tumor having MSI high status.  Patient has so far gotten 3 cycles of Opdivo last of which was on February 24, 2017.  Her performance status is deteriorating significantly.  CT scan done yesterday on March 1, 2017 shows significant worsening of metastatic disease in the abdomen and pelvis.  At this point result of CAT scan as well as treatment recommendation were discussed with patient and her  as well as daughters at bedside.  Treatment option at this point includes conventional chemotherapy like FOLFIRI versus palliative radiation therapy versus palliative/hospice care.  Patient wants to go with the hospice/palliative care at this point.  We will put in a consult for hospice.    2.  Abdominal pain.  Patient is complaining of increasing abdominal pain.  We will start her on fentanyl 12 µg patch.  Continue with when necessary morphine and oxycodone for now.    3.  Anemia: Anemia workup done today shows anemia consistent with iron deficiency.  Saturation is only 6% and ferritin is only 51 despite of this much progression of the cancer.  In view of patient's severe fatigue we will start her on intravenous iron.  Recommend transfusing when necessary if hemoglobin is less than 7 at this point.    4.  Hypothyroidism: Patient's TSH has gone down from 31-19 after increasing the dose of levothyroxine by Dr. Mota.  Recommend continuing with same dose for now.              Irving Booth MD  3/2/2017  5:42 PM       Electronically signed by Irving Booth,  MD at 3/2/2017  5:50 PM      Malcolm Li MD at 3/3/2017 11:38 AM  Version 1 of 1             [facility]  INPATIENT PROGRESS NOTE    Length of Stay: 1  Date of Admission: 2/28/2017  Primary Care Physician: Kyler Kim MD    Subjective   Chief Complaint: Abdominal pain  HPI: Patient continues to complain of abdominal pain that is intermittent in nature. Pain medication helping to alleviate her symptoms. Having adequate oral intake. No overnight events.     Review of Systems   Constitutional: Negative for chills and fever.   Respiratory: Negative for shortness of breath.    Cardiovascular: Negative for chest pain.   Gastrointestinal: Positive for abdominal pain. Negative for diarrhea, nausea and vomiting.   Genitourinary: Negative for dysuria.   Neurological: Negative for dizziness.        All pertinent negatives and positives are as above. All other systems have been reviewed and are negative unless otherwise stated.     Objective    Temp:  [97 °F (36.1 °C)-97.4 °F (36.3 °C)] 97.3 °F (36.3 °C)  Heart Rate:  [68-77] 74  Resp:  [18] 18  BP: (120-167)/(58-78) 141/64  Physical Exam   Constitutional: She is oriented to person, place, and time. She appears well-developed and well-nourished.   Cardiovascular: Normal rate, regular rhythm and normal heart sounds.  Exam reveals no gallop and no friction rub.    No murmur heard.  Pulmonary/Chest: Effort normal and breath sounds normal. No respiratory distress. She has no wheezes. She has no rales.   Abdominal: Soft. Bowel sounds are normal. She exhibits no mass. There is tenderness. There is no rebound and no guarding. No hernia.   Musculoskeletal: Normal range of motion. She exhibits no edema.   Neurological: She is alert and oriented to person, place, and time.   Skin: Skin is warm and dry.   Psychiatric: She has a normal mood and affect. Her behavior is normal.   Vitals reviewed.            Results  Review:  I have reviewed the labs, radiology results, and diagnostic studies.    Laboratory Data:   Lab Results (last 24 hours)     Procedure Component Value Units Date/Time    Blood Culture [[id number]]  (Normal) Collected:  02/28/17 1827    Specimen:  Blood from Arm, Left Updated:  03/02/17 1901     Blood Culture No growth at 2 days     Blood Culture [[id number]]  (Normal) Collected:  02/28/17 1827    Specimen:  Blood from Arm, Left Updated:  03/02/17 1901     Blood Culture No growth at 2 days     CBC & Differential [[id number]] Collected:  03/03/17 0522    Specimen:  Blood Updated:  03/03/17 0655    Narrative:       The following orders were created for panel order CBC & Differential.  Procedure                               Abnormality         Status                     ---------                               -----------         ------                     CBC Auto Differential[[id number]]         Abnormal            Final result                 Please view results for these tests on the individual orders.    CBC Auto Differential [[id number]]  (Abnormal) Collected:  03/03/17 0522    Specimen:  Blood Updated:  03/03/17 0655     WBC 7.59 10*3/mm3      RBC 3.26 (L) 10*6/mm3      Hemoglobin 9.1 (L) g/dL      Hematocrit 27.4 (L) %      MCV 84.0 fL      MCH 27.9 pg      MCHC 33.2 g/dL      RDW 15.3 (H) %      RDW-SD 47.2 (H) fl      MPV 9.1 fL      Platelets 248 10*3/mm3      Neutrophil % 70.2 %      Lymphocyte % 15.2 %      Monocyte % 11.3 %      Eosinophil % 2.5 %      Basophil % 0.3 %      Immature Grans % 0.5 %      Neutrophils, Absolute 5.33 10*3/mm3      Lymphocytes, Absolute 1.15 10*3/mm3      Monocytes, Absolute 0.86 10*3/mm3      Eosinophils, Absolute 0.19 10*3/mm3      Basophils, Absolute 0.02 10*3/mm3      Immature Grans, Absolute 0.04 (H) 10*3/mm3     Basic Metabolic Panel [[id number]]  (Abnormal) Collected:  03/03/17 0522    Specimen:  Blood Updated:  03/03/17 0729     Glucose 82 mg/dL      BUN 8 mg/dL       Creatinine 0.54 mg/dL      Sodium 134 (L) mmol/L      Potassium 3.8 mmol/L      Chloride 103 mmol/L      CO2 24.0 mmol/L      Calcium 8.6 mg/dL      eGFR Non African Amer 110 mL/min/1.73      BUN/Creatinine Ratio 14.8      Anion Gap 7.0 mmol/L     Narrative:       The MDRD GFR formula is only valid for adults with stable renal function between ages 18 and 70.          Culture Data:   BLOOD CULTURE   Date Value Ref Range Status   02/28/2017 No growth at 2 days  Preliminary   02/28/2017 No growth at 2 days  Preliminary     URINE CULTURE   Date Value Ref Range Status   03/01/2017 Mixed Culture  Final     No results found for: RESPCX  No results found for: WOUNDCX  No results found for: STOOLCX  No components found for: BODYFLD    Radiology Data:   Imaging Results (last 24 hours)     ** No results found for the last 24 hours. **          I have reviewed the patient current medications.     Assessment/Plan   1) Metastatic colon cancer   2) Iron deficiency anemia  3) Hypothyroidism   4) Abdominal pain, likely related to #1  5) Generalized weakness with functional decline     Plan:  Dr. Booth has discussed with family poor prognosis and the extensive metastatic colon cancer. Family and patient have decided on palliation and comfort care. Hospice has been consulted and hospice nurse will come speak to patient and family today.   IV Iron started to help with iron deficiency anemia  Continue Synthroid  Continue Fentanyl patch, Morphine for breakthrough pain     Disposition will be based on whether patient will be accepted to hospice and options for hospice care.        Malcolm Li MD   03/03/17   11:38 AM       Electronically signed by Malcolm Li MD at 3/3/2017 11:47 AM           Consult Notes (last 72 hours) (Notes from 2/28/2017  3:22 PM through 3/3/2017  3:22 PM)      Irving Booth MD at 3/1/2017  5:56 PM  Version 1 of 1     Consult Orders:    1. Inpatient Consult to Hematology & Oncology [[id number]] ordered  by Kelly Delarosa MD at 17 1557                DATE OF CONSULT: 3/1/2017    REQUESTING SOURCE:  Dr. REN Delarosa      REASON FOR CONSULTATION: Metastatic colon cancer and GI bleeding      HISTORY OF PRESENT ILLNESS:    77-year-old female with a past medical history significant for recurrent colon cancer with pelvic sidewall as well as anterior abdominal wall metastasis was been on treatment with Opdivo recently.  Her third cycle of Opdivo was on 2017.  Patient is admitted to [facility] on 2017 with complaint of generalized weakness which has progressively got worse over the last few weeks.  Patient also complains of blood in the stool and dark stool which is ongoing for last 5 months but she has not ever mentioned it to anyone before.  Denies any fever or chills.  She states she is feeling more fatigued than usual and does not have any energy to do any activity.  She spends most of her day in bed or chair.    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 6 of 24 lymph nodes positive   • Hordeolum    • Hyperlipidemia    • Hypertension    • Peripheral venous insufficiency    • Posterior subcapsular polar senile cataract      Left   • Stricture of esophagus    • URI (upper respiratory infection)    • UTI (urinary tract infection)    • Visual discomfort      Left       PAST SURGICAL HISTORY:  Past Surgical History   Procedure Laterality Date   •  section     • Hysterectomy     • Carpal tunnel release  2014     Left carpal tunnel release   •  Cholecystectomy  02/11/2016     Laparoscopic converted to open hemicolectomy. Right colon cancer   • Endoscopy and colonoscopy  02/12/2015     Internal & external hemorrhoids found. Diverticulum in sigmoid colon & descending colon   • Colonoscopy  02/10/2016     No specimens collected. Mild diverticulosis in the sigmoid colon.Malignant appearing tumor in the colon. Biopsied   • Esophagoscopy / egd  02/10/2016     With biopsy-Benign appearing esophageal stenosis.Chronic gastritis.Biopsied.Normal examined duodenum   • Esophagoscopy / egd  02/12/2015     with tube-Esophageal stricture present. Dilatation performed. Gastritis in stomach. Biopsy taken. Normal duodenum   • Venous access device (port) insertion  03/10/2016     Ultrasound guided right Mediport placement under fluoroscopy   • Cataract extraction  10/15/2015     Cataract extraction with intraocular lens implantation, left eye       ALLERGIES:    Allergies   Allergen Reactions   • Latex    • Adhesive Tape Rash       SOCIAL HISTORY:   Social History   Substance Use Topics   • Smoking status: Never Smoker   • Smokeless tobacco: Never Used   • Alcohol use No       CURRENT MEDICATIONS:    Current Facility-Administered Medications   Medication Dose Route Frequency Provider Last Rate Last Dose   • aspirin EC tablet 81 mg  81 mg Oral Every Other Day Javier Peña MD   81 mg at 02/28/17 2153   • atorvastatin (LIPITOR) tablet 20 mg  20 mg Oral Q PM Javier Peña MD   20 mg at 03/01/17 1633   • diltiaZEM CD (CARDIZEM CD) 24 hr capsule 120 mg  120 mg Oral Q24H Javier Peña MD   120 mg at 03/01/17 0852   • escitalopram (LEXAPRO) tablet 10 mg  10 mg Oral Daily Javier Peña MD   10 mg at 03/01/17 0852   • hydrochlorothiazide (HYDRODIURIL) tablet 25 mg  25 mg Oral Daily Javier Peña MD   25 mg at 03/01/17 0853   • labetalol (NORMODYNE) tablet 100 mg  100 mg Oral BID Javier Peña MD   100 mg at 03/01/17 1711   • [START ON 3/2/2017] levothyroxine  (SYNTHROID, LEVOTHROID) tablet 125 mcg  125 mcg Oral Q AM Kelly Delarosa MD       • megestrol (MEGACE) 40 MG/ML suspension 400 mg  400 mg Oral Daily Javier Peña MD   400 mg at 03/01/17 0852   • Morphine sulfate (PF) injection 1 mg  1 mg Intravenous Q4H PRN Javier Peña MD   1 mg at 02/28/17 2252    And   • naloxone (NARCAN) injection 0.4 mg  0.4 mg Intravenous Q5 Min PRN Javier Peña MD       • ondansetron (ZOFRAN) tablet 4 mg  4 mg Oral 4x Daily PRN Javier Peña MD   4 mg at 03/01/17 0706   • oxyCODONE (ROXICODONE) immediate release tablet 5 mg  5 mg Oral Q8H PRN Javier Peña MD       • oxyCODONE-acetaminophen (PERCOCET) 7.5-325 MG per tablet 1 tablet  1 tablet Oral Q4H PRN Javier Peña MD   1 tablet at 03/01/17 1643   • pantoprazole (PROTONIX) EC tablet 40 mg  40 mg Oral Daily Javier Peña MD   40 mg at 03/01/17 0852   • potassium chloride (K-DUR,KLOR-CON) CR tablet 40 mEq  40 mEq Oral Once Kelly Delarosa MD       • sodium chloride 0.9 % flush 1-10 mL  1-10 mL Intravenous PRN Javier Peña MD       • sodium chloride 0.9 % flush 10 mL  10 mL Intravenous PRN Jeff Wilder MD       • sodium chloride 0.9 % infusion  100 mL/hr Intravenous Continuous Javier Peña  mL/hr at 03/01/17 1633 100 mL/hr at 03/01/17 1633        HOME MEDICATIONS:   No current facility-administered medications on file prior to encounter.      Current Outpatient Prescriptions on File Prior to Encounter   Medication Sig Dispense Refill   • aspirin 81 MG EC tablet Take 81 mg by mouth Every Other Day.     • atorvastatin (LIPITOR) 20 MG tablet Take 20 mg by mouth Every Evening.     • diltiazem CD (CARDIZEM CD) 120 MG 24 hr capsule Take 120 mg by mouth every 12 (twelve) hours.     • escitalopram (LEXAPRO) 10 MG tablet Take 10 mg by mouth daily.     • hydrochlorothiazide (HYDRODIURIL) 25 MG tablet Take 25 mg by mouth daily.     • labetalol (NORMODYNE) 100 MG tablet Take 100 mg by mouth 2 (two) times  a day.     • levothyroxine (SYNTHROID, LEVOTHROID) 100 MCG tablet Take 1 tablet by mouth Daily. 30 tablet 11   • megestrol (MEGACE) 40 MG/ML suspension Take 10 mL by mouth Daily. 300 mL 1   • ondansetron (ZOFRAN) 4 MG tablet Take 1 tablet by mouth 4 (Four) Times a Day As Needed for nausea or vomiting. 40 tablet 3   • oxyCODONE (ROXICODONE) 5 MG immediate release tablet Take 1 tablet by mouth Every 8 (Eight) Hours As Needed for moderate pain (4-6) or severe pain (7-10). 40 tablet 0   • pantoprazole (PROTONIX) 40 MG EC tablet Take 40 mg by mouth daily. Take every morning before breakfast         FAMILY HISTORY:    Family History   Problem Relation Age of Onset   • Diabetes Mother    • Heart disease Mother    • Hyperlipidemia Mother    • Vision loss Mother    • Arthritis Mother    • Diabetes Father    • Heart disease Father    • Hyperlipidemia Father    • Vision loss Father    • Stroke Father    • Cancer Sister        REVIEW OF SYSTEMS:      CONSTITUTIONAL:  Complains of fatigue.  Complains generalized weakness.  Complains of weight loss and poor appetite.  Denies any fever, chills.    HEENT:  No epistaxis, mouth sores or difficulty swallowing.    RESPIRATORY:  No new shortness of breath. No new cough or hemoptysis.    CARDIOVASCULAR:  No chest pain or palpitations.    GASTROINTESTINAL: Complains of abdominal pain at the site of lump in anterior abdominal wall.  Complains of blood in the stool or dark stool for last 4-5 months.  Complains of intermittent nausea.     GENITOURINARY: No Dysuria or Hematuria.    MUSCULOSKELETAL:  Complains of chronic lower back pain.    LYMPHATICS:  Denies any abnormal swollen glands anywhere in the body.    NEUROLOGICAL : Positive for chronic tingling and numbness affecting upper extremity and lower extremity since FOLFOX chemotherapy. No headache or dizziness. No seizures or balance problems.    SKIN: Positive for lumps or anterior abdominal wall around surgical site.    PHYSICAL  EXAMINATION:      VITAL SIGNS:  Temp:  [96.3 °F (35.7 °C)-97.5 °F (36.4 °C)] 97.3 °F (36.3 °C)  Heart Rate:  [68-85] 70  Resp:  [16-18] 18  BP: (118-150)/(56-68) 118/56    GENERAL:  Not in any distress.    HEENT:  Normocephalic, Atraumatic.conjunctivae  pallor present. No icterus. Extraocular Movements Intact. No Fascial Asymmetry noted.    NECK:  No adenopathy. NO JVD.    RESPIRATORY:  Fair air entry bilateral. No rhonchi or wheezing.    CARDIOVASCULAR:  S1, S2. Regular rate and rhythm. No murmur or gallop appreciated.    ABDOMEN:  Soft, obese, nontender.  2 lumps palpable along the midline surgical incision.  The upper one measures about 6 x 6 cm which has got bigger in recent past.  Bowel sounds present in all four quadrants.  No organomegaly appreciated.    EXTREMITIES:  No edema.No Calf Tenderness.    NEUROLOGIC:  Alert, awake and oriented ×3.          DIAGNOSTIC DATA:    WBC   Date Value Ref Range Status   03/01/2017 8.19 3.20 - 9.80 10*3/mm3 Final     RBC   Date Value Ref Range Status   03/01/2017 3.34 (L) 3.77 - 5.16 10*6/mm3 Final     HEMOGLOBIN   Date Value Ref Range Status   03/01/2017 9.3 (L) 12.0 - 15.5 g/dL Final     HEMATOCRIT   Date Value Ref Range Status   03/01/2017 27.5 (L) 35.0 - 45.0 % Final     MCV   Date Value Ref Range Status   03/01/2017 81.4 80.0 - 98.0 fL Final     MCH   Date Value Ref Range Status   03/01/2017 27.8 26.5 - 34.0 pg Final     MCHC   Date Value Ref Range Status   03/01/2017 34.2 31.4 - 36.0 g/dL Final     RDW   Date Value Ref Range Status   03/01/2017 15.3 (H) 11.5 - 14.5 % Final     RDW-SD   Date Value Ref Range Status   03/01/2017 45.5 36.4 - 46.3 fl Final     MPV   Date Value Ref Range Status   03/01/2017 8.5 8.0 - 12.0 fL Final     PLATELETS   Date Value Ref Range Status   03/01/2017 193 150 - 450 10*3/mm3 Final     NEUTROPHIL %   Date Value Ref Range Status   03/01/2017 67.6 37.0 - 80.0 % Final     LYMPHOCYTE %   Date Value Ref Range Status   03/01/2017 16.1 10.0 - 50.0  % Final     MONOCYTE %   Date Value Ref Range Status   03/01/2017 14.2 (H) 0.0 - 12.0 % Final     EOSINOPHIL %   Date Value Ref Range Status   03/01/2017 1.5 0.0 - 7.0 % Final     BASOPHIL %   Date Value Ref Range Status   03/01/2017 0.1 0.0 - 2.0 % Final     IMMATURE GRANS %   Date Value Ref Range Status   03/01/2017 0.5 0.0 - 0.5 % Final     NEUTROPHILS, ABSOLUTE   Date Value Ref Range Status   03/01/2017 5.54 2.00 - 8.60 10*3/mm3 Final     LYMPHOCYTES, ABSOLUTE   Date Value Ref Range Status   03/01/2017 1.32 0.60 - 4.20 10*3/mm3 Final     MONOCYTES, ABSOLUTE   Date Value Ref Range Status   03/01/2017 1.16 (H) 0.00 - 0.90 10*3/mm3 Final     EOSINOPHILS, ABSOLUTE   Date Value Ref Range Status   03/01/2017 0.12 0.00 - 0.70 10*3/mm3 Final     BASOPHILS, ABSOLUTE   Date Value Ref Range Status   03/01/2017 0.01 0.00 - 0.20 10*3/mm3 Final     IMMATURE GRANS, ABSOLUTE   Date Value Ref Range Status   03/01/2017 0.04 (H) 0.00 - 0.02 10*3/mm3 Final     GLUCOSE   Date Value Ref Range Status   03/01/2017 97 60 - 100 mg/dL Final     SODIUM   Date Value Ref Range Status   03/01/2017 129 (L) 137 - 145 mmol/L Final     POTASSIUM   Date Value Ref Range Status   03/01/2017 3.3 (L) 3.5 - 5.1 mmol/L Final     CO2   Date Value Ref Range Status   03/01/2017 26.0 22.0 - 31.0 mmol/L Final     CHLORIDE   Date Value Ref Range Status   03/01/2017 98 95 - 110 mmol/L Final     ANION GAP   Date Value Ref Range Status   03/01/2017 5.0 5.0 - 15.0 mmol/L Final     CREATININE   Date Value Ref Range Status   03/01/2017 0.70 0.50 - 1.00 mg/dL Final     BUN   Date Value Ref Range Status   03/01/2017 20 7 - 21 mg/dL Final     BUN/CREATININE RATIO   Date Value Ref Range Status   03/01/2017 28.6 (H) 7.0 - 25.0 Final     CALCIUM   Date Value Ref Range Status   03/01/2017 8.2 (L) 8.4 - 10.2 mg/dL Final     EGFR NON  AMER   Date Value Ref Range Status   03/01/2017 82 >60 mL/min/1.73 Final     ALKALINE PHOSPHATASE   Date Value Ref Range Status    03/01/2017 73 38 - 126 U/L Final     TOTAL PROTEIN   Date Value Ref Range Status   03/01/2017 5.3 (L) 6.3 - 8.6 g/dL Final     ALT (SGPT)   Date Value Ref Range Status   03/01/2017 33 9 - 52 U/L Final     AST (SGOT)   Date Value Ref Range Status   03/01/2017 19 14 - 36 U/L Final     TOTAL BILIRUBIN   Date Value Ref Range Status   03/01/2017 0.6 0.2 - 1.3 mg/dL Final     ALBUMIN   Date Value Ref Range Status   03/01/2017 2.90 (L) 3.40 - 4.80 g/dL Final     GLOBULIN   Date Value Ref Range Status   03/01/2017 2.4 2.3 - 3.5 gm/dL Final     A/G RATIO   Date Value Ref Range Status   03/01/2017 1.2 1.1 - 1.8 g/dL Final     Lab Results   Component Value Date    IRON 42 01/17/2017    TIBC 359 01/17/2017    LABIRON 11.7 (L) 01/17/2017    FERRITIN 43.1 01/17/2017    [id number] 523 10/24/2016    FOLATE 13.70 10/24/2016     Lab Results   Component Value Date    CEA 11.7 (H) 01/17/2017    REFLABREPO SEE NOTE: 02/10/2016   ]    ASSESSMENT AND PLAN:      1. Recurrent metastatic colon cancer. Patient initially was diagnosed with a stage III disease T4 N2 M0 in February 2016. Patient initially had a hemicolectomy followed by adjuvant FOLFOX 6 for 8 cycles last of which was in August 2016. Chemotherapy was held at that point due to worsening performance status side effects as well as recurrent hospital admission. In October 2016 she had a recurrence at the site of anastomosis for which Dr. Quesada did total colectomy. After that patient was seen at [facility] for a second opinion, due to PET scan being negative for any metastatic disease except for the site of recurrence the recommended close monitoring for recurrence. In December 2016 around [place] she started feeling a lump around anterior abdominal wall next to the surgical incision for which Dr. Quesada did a CAT scan of chest abdomen and pelvis on January 12, 2017, it showed there was a recurrence and metastatic disease involving anterior abdominal wall mesentery as well as  spleen and pelvic sidewall. For which patient was again seen by Dr. Peck at [facility] and recommendation was to start her on Opdivo due to tumor having MSI high status.  Patient has so far gotten 3 cycles of Opdivo last of which was on February 24, 2017.  Her performance status is deteriorating significantly.  It was again discussed with the patient and her family members in the room, purpose of chemotherapy is palliation rather then cure.  If she continues to deteriorate clinically would not recommend any more chemotherapy.  At this point will order CT of chest abdomen and pelvis with by mouth and IV contrast to see what tumor has done after 3 cycles of Opdivo.  Further treatment recommendation based on CT scan.    2.  Generalized weakness and fatigue: Questionable secondary to hypothyroidism versus developing iron deficiency.  At this point we'll send out anemia workup in the morning consisting of iron, TIBC, ferritin, vitamin B12 and folate.  Recommend transfusing when necessary if hemoglobin is less than 7.    3.  Hypothyroidism: Patient's TSH has gone down from 31-19 after increasing the dose of levothyroxine by Dr. Mota.  Recommend continuing with same dose for now.    4.  Black stool: We will do CT of abdomen and pelvis with by mouth contrast to see if there is any evidence of colitis or any tumor affecting the rectal area which could contribute to that.  If CT scan is nondiagnostic then patient might benefit from gastroenterology evaluation.      Thank you for this consultation.    Irving Booth MD  3/1/2017  5:56 PM       Electronically signed by Irving Booth MD at 3/1/2017  6:09 PM

## 2017-03-03 NOTE — THERAPY DISCHARGE NOTE
Acute Care - Physical Therapy Discharge Summary  [facility]       Patient Name: Bernadette Camarena  : 1940  MRN: [id number]    Today's Date: 3/3/2017       Date of Referral to PT: 17  Referring Physician: REN Delarosa      Admit Date: 2017      PT Recommendation and Plan    Visit Dx:    ICD-10-CM ICD-9-CM   1. Lower GI bleed K92.2 578.9   2. Hypokalemia E87.6 276.8   3. General weakness R53.1 780.79   4. Non-intractable vomiting with nausea, unspecified vomiting type R11.2 787.01   5. Impaired mobility and activities of daily living Z74.09 799.89             Outcome Measures       17 0903 17 0902       How much help from another person do you currently need...    Turning from your back to your side while in flat bed without using bedrails?  4  -AW     Moving from lying on back to sitting on the side of a flat bed without bedrails?  3  -AW     Moving to and from a bed to a chair (including a wheelchair)?  3  -AW     Standing up from a chair using your arms (e.g., wheelchair, bedside chair)?  3  -AW     Climbing 3-5 steps with a railing?  3  -AW     To walk in hospital room?  3  -AW     AM-PAC 6 Clicks Score  19  -AW     How much help from another is currently needed...    Putting on and taking off regular lower body clothing? 4  -RB      Bathing (including washing, rinsing, and drying) 3  -RB      Toileting (which includes using toilet bed pan or urinal) 4  -RB      Putting on and taking off regular upper body clothing 4  -RB      Taking care of personal grooming (such as brushing teeth) 4  -RB      Eating meals 4  -RB      Score 23  -RB      Functional Assessment    Outcome Measure Options AM-PAC 6 Clicks Daily Activity (OT)  -RB AM-PAC 6 Clicks Basic Mobility (PT)  -AW       User Key  (r) = Recorded By, (t) = Taken By, (c) = Cosigned By    Initials Name Provider Type    KARMEN Ruvalcaba, OT Occupational Therapist    AW Zee Owusu, PT Physical Therapist                      IP PT  Goals       03/03/17 1603 03/02/17 1423 03/02/17 1034    Transfer Training 2 PT LTG    Transfer Training PT 2 LTG, Date Established   03/02/17  -AW    Transfer Training PT 2 LTG, Time to Achieve   by discharge  -AW    Transfer Training PT 2 LTG, Activity Type   sit to stand/stand to sit  -AW    Transfer Training PT 2 LTG, [place] Level   conditional independence  -AW    Transfer Training PT 2 LTG, Assist Device   walker, rolling  -AW    Transfer Training PT 2 LTG, Date Goal Reviewed 03/03/17  -MN 03/02/17  -AW     Transfer Training PT 2 LTG, Outcome goal not met  -MN goal ongoing  -AW goal revised  -AW    Transfer Training PT 2 LTG, Reason Goal Not Met discharged from facility  -MN      Gait Training PT LTG    Gait Training Goal PT LTG, Date Established   03/02/17  -AW    Gait Training Goal PT LTG, Time to Achieve   by discharge  -AW    Gait Training Goal PT LTG, [place] Level   conditional independence  -AW    Gait Training Goal PT LTG, Assist Device   walker, rolling  -AW    Gait Training Goal PT LTG, Distance to Achieve   300  -AW    Gait Training Goal PT LTG, Date Goal Reviewed 03/03/17  -MN 03/02/17  -AW     Gait Training Goal PT LTG, Outcome goal not met  -MN goal ongoing  -AW     Gait Training Goal PT LTG, Reason Goal Not Met discharged from facility  -MN      Stair Training PT LTG    Stair Training Goal PT LTG, Date Established   03/02/17  -AW    Stair Training Goal PT LTG, Time to Achieve   by discharge  -AW    Stair Training Goal PT LTG, Number of Steps   3  -AW    Stair Training Goal PT LTG, [place] Level   supervision required  -AW    Stair Training Goal PT LTG, Date Goal Reviewed 03/03/17  -MN 03/02/17  -AW     Stair Training Goal PT LTG, Outcome goal not met  -MN goal ongoing  -AW goal revised  -AW    Stair Training Goal PT LTG, Reason Goal Not Met discharged from facility  -MN        03/02/17 1032          Transfer Training 2 PT LTG    Transfer Training PT 2 LTG, Date Established (P)   03/02/17  -AW      Transfer Training PT 2 LTG, Time to Achieve (P)  by discharge  -AW      Transfer Training PT 2 LTG, Activity Type (P)  sit to stand/stand to sit  -AW      Transfer Training PT 2 LTG, [place] Level (P)  conditional independence  -AW      Transfer Training PT 2 LTG, Assist Device (P)  walker, rolling  -AW      Stair Training PT LTG    Stair Training Goal PT LTG, Date Established (P)  03/02/17  -AW      Stair Training Goal PT LTG, Time to Achieve (P)  by discharge  -AW      Stair Training Goal PT LTG, Number of Steps (P)  3  -AW      Stair Training Goal PT LTG, [place] Level (P)  supervision required  -AW        User Key  (r) = Recorded By, (t) = Taken By, (c) = Cosigned By    Initials Name Provider Type    JOE Mcgovern, PT Physical Therapist    AW Zee Owusu, PT Physical Therapist              PT Discharge Summary  Anticipated Discharge Disposition: home with assist  Reason for Discharge: Discharge from facility, Per MD order (d/c to hospice)  Outcomes Achieved: Unable to make functional progress toward goals at this time  Discharge Destination:  (Hospice)      Dang Mcgovern, PT   3/3/2017

## 2017-03-03 NOTE — PLAN OF CARE
Problem: Inpatient Physical Therapy  Goal: Transfer Training Goal 2 LTG- PT  Outcome: Unable to achieve outcome(s) by discharge Date Met:  03/03/17 03/03/17 1603   Transfer Training 2 PT LTG   Transfer Training PT 2 LTG, Date Goal Reviewed 03/03/17   Transfer Training PT 2 LTG, Outcome goal not met   Transfer Training PT 2 LTG, Reason Goal Not Met discharged from facility       Goal: Stair Training Goal LTG- PT  Outcome: Unable to achieve outcome(s) by discharge Date Met:  03/03/17 03/03/17 1603   Stair Training PT LTG   Stair Training Goal PT LTG, Date Goal Reviewed 03/03/17   Stair Training Goal PT LTG, Outcome goal not met   Stair Training Goal PT LTG, Reason Goal Not Met discharged from facility       Goal: Transfer Training Goal 2 LTG- PT  Outcome: Unable to achieve outcome(s) by discharge Date Met:  03/03/17 03/03/17 1603   Transfer Training 2 PT LTG   Transfer Training PT 2 LTG, Date Goal Reviewed 03/03/17   Transfer Training PT 2 LTG, Outcome goal not met   Transfer Training PT 2 LTG, Reason Goal Not Met discharged from facility       Goal: Gait Training Goal LTG- PT  Outcome: Unable to achieve outcome(s) by discharge Date Met:  03/03/17 03/03/17 1603   Gait Training PT LTG   Gait Training Goal PT LTG, Date Goal Reviewed 03/03/17   Gait Training Goal PT LTG, Outcome goal not met   Gait Training Goal PT LTG, Reason Goal Not Met discharged from facility       Goal: Stair Training Goal LTG- PT  Outcome: Unable to achieve outcome(s) by discharge Date Met:  03/03/17 03/03/17 1603   Stair Training PT LTG   Stair Training Goal PT LTG, Date Goal Reviewed 03/03/17   Stair Training Goal PT LTG, Outcome goal not met   Stair Training Goal PT LTG, Reason Goal Not Met discharged from facility

## 2017-03-03 NOTE — PLAN OF CARE
Problem: Patient Care Overview (Adult)  Goal: Plan of Care Review  Outcome: Ongoing (interventions implemented as appropriate)    03/03/17 4374   Coping/Psychosocial Response Interventions   Plan Of Care Reviewed With patient   Patient Care Overview   Progress no change   Outcome Evaluation   Outcome Summary/Follow up Plan pt c/o pain; pain med effective for short term durations; hospice consulted; pt pain meds to be reevaluated       Goal: Adult Individualization and Mutuality  Outcome: Ongoing (interventions implemented as appropriate)    Problem: Gastrointestinal Bleeding (Adult)  Goal: Signs and Symptoms of Listed Potential Problems Will be Absent or Manageable (Gastrointestinal Bleeding)  Outcome: Ongoing (interventions implemented as appropriate)    Problem: Pain, Acute (Adult)  Goal: Acceptable Pain Control/Comfort Level  Outcome: Ongoing (interventions implemented as appropriate)

## 2017-03-04 NOTE — PLAN OF CARE
Problem: Dying Patient, Actively (Adult)  Goal: Comfort/Pain Control  Outcome: Ongoing (interventions implemented as appropriate)  Goal: Dying Process, Peace and Dignity  Outcome: Ongoing (interventions implemented as appropriate)    Problem: Grieving (Adult)  Goal: Identify Related Risk Factors and Signs and Symptoms  Outcome: Ongoing (interventions implemented as appropriate)  Goal: Knowledge of Grief and Grieving  Outcome: Ongoing (interventions implemented as appropriate)    Problem: Pain, Acute (Adult)  Goal: Identify Related Risk Factors and Signs and Symptoms  Outcome: Ongoing (interventions implemented as appropriate)  Goal: Acceptable Pain Control/Comfort Level  Outcome: Ongoing (interventions implemented as appropriate)    Problem: Patient Care Overview (Adult)  Goal: Plan of Care Review  Outcome: Ongoing (interventions implemented as appropriate)    03/04/17 1423   Coping/Psychosocial Response Interventions   Plan Of Care Reviewed With patient   Patient Care Overview   Progress declining   Outcome Evaluation   Outcome Summary/Follow up Plan pt comfort measures       Goal: Adult Individualization and Mutuality  Outcome: Ongoing (interventions implemented as appropriate)

## 2017-03-04 NOTE — PROGRESS NOTES
[facility] Medicine Services  INPATIENT PROGRESS NOTE    Length of Stay: 1  Date of Admission: 3/3/2017  Primary Care Physician: Kyler Kim MD    Subjective   Chief Complaint: Abdominal pain  HPI: Patient currently resting comfortably in bed. Does continue to complain of abdominal pain at times. IV pain medication helps to alleviate her pain.     Review of Systems   Constitutional: Negative for chills and fever.   Respiratory: Negative for shortness of breath.    Cardiovascular: Negative for chest pain.   Gastrointestinal: Positive for abdominal pain. Negative for diarrhea, nausea and vomiting.   Genitourinary: Negative for dysuria.   Neurological: Negative for dizziness.        All pertinent negatives and positives are as above. All other systems have been reviewed and are negative unless otherwise stated.     Objective    Temp:  [97.5 °F (36.4 °C)-98.4 °F (36.9 °C)] 98.1 °F (36.7 °C)  Heart Rate:  [] 100  Resp:  [18] 18  BP: (122-158)/(56-65) 138/65  Physical Exam   Constitutional: She is oriented to person, place, and time. She appears well-developed and well-nourished.   Cardiovascular: Normal rate, regular rhythm and normal heart sounds.  Exam reveals no gallop and no friction rub.    No murmur heard.  Pulmonary/Chest: Effort normal and breath sounds normal. No respiratory distress. She has no wheezes. She has no rales.   Abdominal: Soft. Bowel sounds are normal. She exhibits no mass. There is tenderness. There is no rebound and no guarding. No hernia.   Musculoskeletal: Normal range of motion. She exhibits no edema.   Neurological: She is alert and oriented to person, place, and time.   Skin: Skin is warm and dry.   Psychiatric: She has a normal mood and affect. Her behavior is normal.   Vitals reviewed.            Results Review:  I have reviewed the labs, radiology results, and diagnostic studies.    Laboratory Data:   Lab Results (last 24 hours)     **  No results found for the last 24 hours. **          Culture Data:   No results found for: BLOODCX  No results found for: URINECX  No results found for: RESPCX  No results found for: WOUNDCX  No results found for: STOOLCX  No components found for: BODYFLD    Radiology Data:   Imaging Results (last 24 hours)     ** No results found for the last 24 hours. **          I have reviewed the patient current medications.     Assessment/Plan   1) Metastatic colon cancer   2) Iron deficiency anemia  3) Hypothyroidism   4) Abdominal pain, likely related to #1  5) Generalized weakness with functional decline     Plan:  Patient is currently under hospice care. Continue pain control, Ativan for anxiety control. Family to decide on continued inpatient hospice vs. Home with hospice.     Malcolm Li MD   03/04/17   2:45 PM

## 2017-03-04 NOTE — PLAN OF CARE
Problem: Dying Patient, Actively (Adult)  Goal: Identify Related Risk Factors and Signs and Symptoms  Outcome: Outcome(s) achieved Date Met:  03/04/17 03/04/17 0515   Dying Patient, Actively   Actively Dying Patient: Related Risk Factors age/developmental level       Goal: Comfort/Pain Control  Outcome: Ongoing (interventions implemented as appropriate)  Goal: Dying Process, Peace and Dignity  Outcome: Ongoing (interventions implemented as appropriate)    Problem: Grieving (Adult)  Goal: Identify Related Risk Factors and Signs and Symptoms  Outcome: Ongoing (interventions implemented as appropriate)  Goal: Knowledge of Grief and Grieving  Outcome: Ongoing (interventions implemented as appropriate)    Problem: Pain, Acute (Adult)  Goal: Identify Related Risk Factors and Signs and Symptoms  Outcome: Ongoing (interventions implemented as appropriate)  Goal: Acceptable Pain Control/Comfort Level  Outcome: Ongoing (interventions implemented as appropriate)    Problem: Pressure Ulcer Risk (Jeb Scale) (Adult,Obstetrics,Pediatric)  Goal: Identify Related Risk Factors and Signs and Symptoms  Outcome: Outcome(s) achieved Date Met:  03/04/17  Goal: Skin Integrity  Outcome: Outcome(s) achieved Date Met:  03/04/17    Problem: Patient Care Overview (Adult)  Goal: Plan of Care Review  Outcome: Ongoing (interventions implemented as appropriate)  Goal: Adult Individualization and Mutuality  Outcome: Ongoing (interventions implemented as appropriate)  Goal: Discharge Needs Assessment  Outcome: Ongoing (interventions implemented as appropriate)

## 2017-03-05 PROBLEM — Z51.5 HOSPICE CARE: Status: ACTIVE | Noted: 2017-01-01

## 2017-03-05 NOTE — PLAN OF CARE
Problem: Dying Patient, Actively (Adult)  Goal: Comfort/Pain Control  Outcome: Ongoing (interventions implemented as appropriate)    03/05/17 0433   Dying Patient, Actively (Adult)   Comfort/Pain Control making progress toward outcome       Goal: Dying Process, Peace and Dignity  Outcome: Ongoing (interventions implemented as appropriate)    Problem: Grieving (Adult)  Goal: Identify Related Risk Factors and Signs and Symptoms  Outcome: Outcome(s) achieved Date Met:  03/05/17  Goal: Knowledge of Grief and Grieving  Outcome: Ongoing (interventions implemented as appropriate)    Problem: Pain, Acute (Adult)  Goal: Identify Related Risk Factors and Signs and Symptoms  Outcome: Outcome(s) achieved Date Met:  03/05/17  Goal: Acceptable Pain Control/Comfort Level  Outcome: Ongoing (interventions implemented as appropriate)    Problem: Patient Care Overview (Adult)  Goal: Adult Individualization and Mutuality  Outcome: Ongoing (interventions implemented as appropriate)  Goal: Discharge Needs Assessment  Outcome: Ongoing (interventions implemented as appropriate)

## 2017-03-05 NOTE — PLAN OF CARE
Problem: Dying Patient, Actively (Adult)  Goal: Comfort/Pain Control  Outcome: Ongoing (interventions implemented as appropriate)    03/05/17 1611   Dying Patient, Actively (Adult)   Comfort/Pain Control making progress toward outcome       Goal: Dying Process, Peace and Dignity  Outcome: Ongoing (interventions implemented as appropriate)    Problem: Grieving (Adult)  Goal: Knowledge of Grief and Grieving  Outcome: Ongoing (interventions implemented as appropriate)    Problem: Pain, Acute (Adult)  Goal: Acceptable Pain Control/Comfort Level  Outcome: Ongoing (interventions implemented as appropriate)    Problem: Patient Care Overview (Adult)  Goal: Plan of Care Review  Outcome: Ongoing (interventions implemented as appropriate)  Goal: Adult Individualization and Mutuality  Outcome: Ongoing (interventions implemented as appropriate)

## 2017-03-05 NOTE — PROGRESS NOTES
[facility] Medicine Services  INPATIENT PROGRESS NOTE    Length of Stay: 2  Date of Admission: 3/3/2017  Primary Care Physician: Kyler Kim MD    Subjective   Chief Complaint: Abdominal Pain  HPI:  States that she is still having pretty constant abdominal pain with breakthrough.    Review of Systems   Constitutional: Positive for fatigue. Negative for appetite change, chills, fever and unexpected weight change.   Respiratory: Negative for cough, choking, chest tightness, shortness of breath and wheezing.    Cardiovascular: Negative for chest pain, palpitations and leg swelling.   Gastrointestinal: Positive for abdominal pain and nausea. Negative for blood in stool, constipation, diarrhea and vomiting.   Genitourinary: Negative for dysuria, flank pain and hematuria.   Neurological: Negative for dizziness, seizures, syncope, speech difficulty, weakness, light-headedness, numbness and headaches.   Hematological: Does not bruise/bleed easily.        All pertinent negatives and positives are as above. All other systems have been reviewed and are negative unless otherwise stated.     Objective    Temp:  [98.3 °F (36.8 °C)-98.7 °F (37.1 °C)] 98.3 °F (36.8 °C)  Heart Rate:  [77-84] 84  Resp:  [18] 18  BP: (119-130)/(56-60) 130/60    Physical Exam   Constitutional: She appears well-developed and well-nourished.   HENT:   Head: Normocephalic and atraumatic.   Eyes: EOM are normal. Pupils are equal, round, and reactive to light.   Neck: Normal range of motion. Neck supple.   Cardiovascular: Normal rate, regular rhythm and normal heart sounds.  Exam reveals no gallop and no friction rub.    No murmur heard.  Pulmonary/Chest: Effort normal and breath sounds normal. No respiratory distress. She has no wheezes. She has no rales. She exhibits no tenderness.   Abdominal: Soft. Bowel sounds are normal. She exhibits no distension. There is no guarding.   Musculoskeletal: She exhibits  no edema.   Skin: Skin is warm and dry.   Psychiatric: She has a normal mood and affect. Her behavior is normal. Thought content normal.   Vitals reviewed.          Results Review:  I have reviewed the labs, radiology results, and diagnostic studies.    Laboratory Data:     Results from last 7 days  Lab Units 03/03/17  0522 03/02/17  0635 03/01/17  0014 02/28/17  1827   SODIUM mmol/L 134* 135* 129* 130*   POTASSIUM mmol/L 3.8 4.0 3.3* 3.1*   CHLORIDE mmol/L 103 103 98 92*   TOTAL CO2 mmol/L 24.0 24.0 26.0 27.0   BUN mg/dL 8 9 20 25*   CREATININE mg/dL 0.54 0.64 0.70 0.75   GLUCOSE mg/dL 82 82 97 90   CALCIUM mg/dL 8.6 8.4 8.2* 8.7   BILIRUBIN mg/dL  --  0.3 0.6 0.6   ALK PHOS U/L  --  66 73 88   ALT (SGPT) U/L  --  28 33 30   AST (SGOT) U/L  --  15 19 19   ANION GAP mmol/L 7.0 8.0 5.0 11.0     Estimated Creatinine Clearance: 67.9 mL/min (by C-G formula based on Cr of 0.54).            Results from last 7 days  Lab Units 03/03/17  0522 03/02/17  0635 03/01/17 1956 03/01/17  0736 03/01/17  0014 02/28/17  1827   WBC 10*3/mm3 7.59 6.66  --   --  8.19 11.41*   HEMOGLOBIN g/dL 9.1* 8.7* 8.7* 9.3* 9.3*  9.3* 10.8*   HEMATOCRIT % 27.4* 25.4* 26.0* 27.5* 27.2*  27.2* 31.6*   PLATELETS 10*3/mm3 248 207  --   --  193 206       Results from last 7 days  Lab Units 02/28/17  1827   INR  1.26*         Radiology Data:   Imaging Results (last 24 hours)     ** No results found for the last 24 hours. **          I have reviewed the patient current medications.     Assessment/Plan     Hospital Problem List     Hospice care          Plan:    1) Metastatic colon cancer:  Admitted to hospice.  Continue supportive care.  2) Abdominal pain, likely related to #1:  Since patient continues to have persistent pain, will increase fentanyl patch.  Will also decrease the interval between her prn dosings of Dilaudid.  Patient would like to go home with hospice, so we will need to come up with a different breakthrough pain medication.  She would  likely do well with sublingual morphine concentrate.  3) Generalized weakness with functional decline               Discharge Planning: I expect patient to be discharged to 1 in 2 days.    Mynor Ramesh MD   03/05/17   11:46 AM

## 2017-03-06 NOTE — PLAN OF CARE
Problem: Dying Patient, Actively (Adult)  Goal: Comfort/Pain Control  Outcome: Ongoing (interventions implemented as appropriate)  Goal: Dying Process, Peace and Dignity  Outcome: Ongoing (interventions implemented as appropriate)    Problem: Grieving (Adult)  Goal: Knowledge of Grief and Grieving  Outcome: Ongoing (interventions implemented as appropriate)    Problem: Pain, Acute (Adult)  Goal: Acceptable Pain Control/Comfort Level  Outcome: Ongoing (interventions implemented as appropriate)    Problem: Patient Care Overview (Adult)  Goal: Plan of Care Review  Outcome: Ongoing (interventions implemented as appropriate)  Pt is making plans for expiring with family; pt has been in pain today; dilaudid given; will continue to monitor.

## 2017-03-06 NOTE — PLAN OF CARE
Problem: Dying Patient, Actively (Adult)  Goal: Dying Process, Peace and Dignity  Outcome: Ongoing (interventions implemented as appropriate)    Problem: Grieving (Adult)  Goal: Knowledge of Grief and Grieving  Outcome: Ongoing (interventions implemented as appropriate)    Problem: Pain, Acute (Adult)  Goal: Acceptable Pain Control/Comfort Level  Outcome: Ongoing (interventions implemented as appropriate)    Problem: Patient Care Overview (Adult)  Goal: Adult Individualization and Mutuality  Outcome: Ongoing (interventions implemented as appropriate)  Goal: Discharge Needs Assessment  Outcome: Ongoing (interventions implemented as appropriate)

## 2017-03-06 NOTE — PROGRESS NOTES
[facility] Medicine Services  INPATIENT PROGRESS NOTE    Length of Stay: 3  Date of Admission: 3/3/2017  Primary Care Physician: Kyler Kim MD    Subjective   Chief Complaint: Abdominal Pain  HPI:  States that she is still having pretty constant abdominal pain with some breakthrough.  Duragesic patch increased today.    Review of Systems   Constitutional: Positive for fatigue. Negative for appetite change, chills, fever and unexpected weight change.   Respiratory: Negative for cough, choking, chest tightness, shortness of breath and wheezing.    Cardiovascular: Negative for chest pain, palpitations and leg swelling.   Gastrointestinal: Positive for abdominal pain and nausea. Negative for blood in stool, constipation, diarrhea and vomiting.   Genitourinary: Negative for dysuria, flank pain and hematuria.   Neurological: Negative for dizziness, seizures, syncope, speech difficulty, weakness, light-headedness, numbness and headaches.   Hematological: Does not bruise/bleed easily.        All pertinent negatives and positives are as above. All other systems have been reviewed and are negative unless otherwise stated.     Objective    Temp:  [96.7 °F (35.9 °C)-98.1 °F (36.7 °C)] 96.7 °F (35.9 °C)  Heart Rate:  [80-86] 83  Resp:  [18] 18  BP: (140-151)/(61-66) 150/65    Physical Exam   Constitutional: She appears well-developed and well-nourished.   HENT:   Head: Normocephalic and atraumatic.   Eyes: EOM are normal. Pupils are equal, round, and reactive to light.   Neck: Normal range of motion. Neck supple.   Cardiovascular: Normal rate, regular rhythm and normal heart sounds.  Exam reveals no gallop and no friction rub.    No murmur heard.  Pulmonary/Chest: Effort normal and breath sounds normal. No respiratory distress. She has no wheezes. She has no rales. She exhibits no tenderness.   Abdominal: Soft. Bowel sounds are normal. She exhibits no distension. There is no  guarding.   Musculoskeletal: She exhibits no edema.   Skin: Skin is warm and dry.   Psychiatric: She has a normal mood and affect. Her behavior is normal. Thought content normal.   Vitals reviewed.          Results Review:  I have reviewed the labs, radiology results, and diagnostic studies.    Laboratory Data:     Results from last 7 days  Lab Units 03/03/17  0522 03/02/17  0635 03/01/17  0014 02/28/17  1827   SODIUM mmol/L 134* 135* 129* 130*   POTASSIUM mmol/L 3.8 4.0 3.3* 3.1*   CHLORIDE mmol/L 103 103 98 92*   TOTAL CO2 mmol/L 24.0 24.0 26.0 27.0   BUN mg/dL 8 9 20 25*   CREATININE mg/dL 0.54 0.64 0.70 0.75   GLUCOSE mg/dL 82 82 97 90   CALCIUM mg/dL 8.6 8.4 8.2* 8.7   BILIRUBIN mg/dL  --  0.3 0.6 0.6   ALK PHOS U/L  --  66 73 88   ALT (SGPT) U/L  --  28 33 30   AST (SGOT) U/L  --  15 19 19   ANION GAP mmol/L 7.0 8.0 5.0 11.0     Estimated Creatinine Clearance: 69.7 mL/min (by C-G formula based on Cr of 0.54).            Results from last 7 days  Lab Units 03/03/17  0522 03/02/17  0635 03/01/17  1956 03/01/17  0736 03/01/17  0014 02/28/17  1827   WBC 10*3/mm3 7.59 6.66  --   --  8.19 11.41*   HEMOGLOBIN g/dL 9.1* 8.7* 8.7* 9.3* 9.3*  9.3* 10.8*   HEMATOCRIT % 27.4* 25.4* 26.0* 27.5* 27.2*  27.2* 31.6*   PLATELETS 10*3/mm3 248 207  --   --  193 206       Results from last 7 days  Lab Units 02/28/17  1827   INR  1.26*         Radiology Data:   Imaging Results (last 24 hours)     ** No results found for the last 24 hours. **          I have reviewed the patient current medications.     Assessment/Plan     Hospital Problem List     Hospice care          Plan:    1) Metastatic colon cancer:  Admitted to hospice.  Continue supportive care.  2) Abdominal pain, likely related to #1:  Since patient continues to have persistent pain, will increase fentanyl patch.  Will also decrease the interval between her prn dosings of Dilaudid.  Patient would like to go home with hospice, so we will need to come up with a different  breakthrough pain medication.  She would likely do well with sublingual morphine concentrate.  3) Generalized weakness with functional decline               Discharge Planning: I expect patient to be discharged to 1 in 2 days.    Mynor Ramesh MD   03/06/17   8:46 AM

## 2017-03-07 NOTE — H&P
[facility] Medicine Admission      Date of Admission: 3/3/2017      Primary Care Physician: Kyler Kim MD      Chief Complaint: Abdominal pain     HPI: The patient is a 77 y.o. female who presented to [facility] with abdominal pain admitted for weakness with metastatic colon cancer. Patient was getting pain medication and Heme/Onc consult. Dr. Booth saw and evaluated the patient. He explained the prognosis to the patient and family. After a long discussion, patient decided to pursue Hospice. Hospice was consulted and patient would be admitted to inpatient hospice.      Past Medical History:  has a past medical history of Acute sinusitis; Anemia; Anxiety; Artificial lens present; Cystitis; Disease of thyroid gland; Diverticular disease; Diverticulitis of colon; Dysphagia; Dysuria; GERD (gastroesophageal reflux disease); Heart murmur; History of malignant neoplasm of colon; Hordeolum; Hyperlipidemia; Hypertension; Peripheral venous insufficiency; Posterior subcapsular polar senile cataract; Stricture of esophagus; URI (upper respiratory infection); UTI (urinary tract infection); and Visual discomfort.    Past Surgical History:  has a past surgical history that includes  section; Hysterectomy; Carpal tunnel release (2014); Cholecystectomy (2016); endoscopy and colonoscopy (2015); Colonoscopy (02/10/2016); Esophagoscopy / EGD (02/10/2016); Esophagoscopy / EGD (2015); Venous Access Device (Port) (03/10/2016); and Cataract extraction (10/15/2015).    Family History: family history includes Arthritis in her mother; Cancer in her sister; Diabetes in her father and mother; Heart disease in her father and mother; Hyperlipidemia in her father and mother; Stroke in her father; Vision loss in her father and mother.    Social History:  reports that she has never smoked. She has never used smokeless tobacco. She reports that she  does not drink alcohol.    Allergies:   Allergies   Allergen Reactions   • Latex    • Adhesive Tape Rash       Medications: Scheduled Meds:  Continuous Infusions:  No current facility-administered medications for this encounter.   PRN Meds:.    Review of Systems:  Review of Systems   Constitutional: Positive for appetite change. Negative for chills and fever.   HENT: Negative for congestion, ear pain, rhinorrhea, sneezing and sore throat.    Respiratory: Negative for cough and shortness of breath.    Cardiovascular: Negative for chest pain, palpitations and leg swelling.   Gastrointestinal: Positive for abdominal pain, nausea and vomiting. Negative for diarrhea.   Endocrine: Negative for polyphagia and polyuria.   Musculoskeletal: Negative for back pain and neck pain.   Skin: Negative for color change and rash.   Neurological: Positive for weakness. Negative for dizziness and syncope.   Psychiatric/Behavioral: Negative for agitation, confusion and dysphoric mood.      Otherwise complete ROS is negative except as mentioned above.    Physical Exam:   Temp:  [96.7 °F (35.9 °C)-98 °F (36.7 °C)] 98 °F (36.7 °C)  Heart Rate:  [73-83] 73  Resp:  [18-20] 20  BP: (122-150)/(58-65) 144/63  Physical Exam   Constitutional: She is oriented to person, place, and time. She appears well-developed and well-nourished.   Cardiovascular: Normal rate, regular rhythm and normal heart sounds.  Exam reveals no gallop and no friction rub.    No murmur heard.  Pulmonary/Chest: Effort normal and breath sounds normal. No respiratory distress. She has no wheezes. She has no rales.   Abdominal: Soft. Bowel sounds are normal. There is tenderness.   Musculoskeletal: Normal range of motion. She exhibits no edema.   Neurological: She is alert and oriented to person, place, and time.   Skin: Skin is warm and dry.   Psychiatric: She has a normal mood and affect. Her behavior is normal.   Vitals reviewed.        Results Reviewed:  I have personally  reviewed current lab, radiology, and data and agree with results.  Lab Results (last 24 hours)     Procedure Component Value Units Date/Time    CBC & Differential [[id number]] Collected:  03/03/17 0522    Specimen:  Blood Updated:  03/03/17 0655    Narrative:       The following orders were created for panel order CBC & Differential.  Procedure                               Abnormality         Status                     ---------                               -----------         ------                     CBC Auto Differential[[id number]]         Abnormal            Final result                 Please view results for these tests on the individual orders.    CBC Auto Differential [[id number]]  (Abnormal) Collected:  03/03/17 0522    Specimen:  Blood Updated:  03/03/17 0655     WBC 7.59 10*3/mm3      RBC 3.26 (L) 10*6/mm3      Hemoglobin 9.1 (L) g/dL      Hematocrit 27.4 (L) %      MCV 84.0 fL      MCH 27.9 pg      MCHC 33.2 g/dL      RDW 15.3 (H) %      RDW-SD 47.2 (H) fl      MPV 9.1 fL      Platelets 248 10*3/mm3      Neutrophil % 70.2 %      Lymphocyte % 15.2 %      Monocyte % 11.3 %      Eosinophil % 2.5 %      Basophil % 0.3 %      Immature Grans % 0.5 %      Neutrophils, Absolute 5.33 10*3/mm3      Lymphocytes, Absolute 1.15 10*3/mm3      Monocytes, Absolute 0.86 10*3/mm3      Eosinophils, Absolute 0.19 10*3/mm3      Basophils, Absolute 0.02 10*3/mm3      Immature Grans, Absolute 0.04 (H) 10*3/mm3     Basic Metabolic Panel [[id number]]  (Abnormal) Collected:  03/03/17 0522    Specimen:  Blood Updated:  03/03/17 0729     Glucose 82 mg/dL      BUN 8 mg/dL      Creatinine 0.54 mg/dL      Sodium 134 (L) mmol/L      Potassium 3.8 mmol/L      Chloride 103 mmol/L      CO2 24.0 mmol/L      Calcium 8.6 mg/dL      eGFR Non African Amer 110 mL/min/1.73      BUN/Creatinine Ratio 14.8      Anion Gap 7.0 mmol/L     Narrative:       The MDRD GFR formula is only valid for adults with stable renal function between ages 18 and 70.         Imaging Results (last 24 hours)     ** No results found for the last 24 hours. **            Assessment:  1) Metastatic colon cancer   2) Iron deficiency anemia  3) Hypothyroidism   4) Abdominal pain, likely related to #1  5) Generalized weakness with functional decline          Plan:  Admit to hospice  Place on Dilaudid, Ativan, Scopolamine. Hospice nurse to follow. Long term plan is home with hospice.         Malcolm Li MD  03/07/17  6:30 AM

## 2017-03-07 NOTE — PLAN OF CARE
Problem: Dying Patient, Actively (Adult)  Goal: Comfort/Pain Control    03/05/17 1611   Dying Patient, Actively (Adult)   Comfort/Pain Control making progress toward out       03/05/17 1611   Dying Patient, Actively (Adult)   Comfort/Pain Control making progress toward outcome   come       Goal: Dying Process, Peace and Dignity  Outcome: Ongoing (interventions implemented as appropriate)    Problem: Grieving (Adult)  Goal: Knowledge of Grief and Grieving  Outcome: Ongoing (interventions implemented as appropriate)    Problem: Pain, Acute (Adult)  Goal: Acceptable Pain Control/Comfort Level  Outcome: Ongoing (interventions implemented as appropriate)    Problem: Patient Care Overview (Adult)  Goal: Plan of Care Review  Outcome: Ongoing (interventions implemented as appropriate)  Goal: Adult Individualization and Mutuality  Outcome: Ongoing (interventions implemented as appropriate)

## 2017-03-07 NOTE — PROGRESS NOTES
[facility] Medicine Services  INPATIENT PROGRESS NOTE    Length of Stay: 4  Date of Admission: 3/3/2017  Primary Care Physician: Kyler Kim MD    Subjective   Chief Complaint: Abdominal Pain  HPI:  States that she is still having pretty constant abdominal pain with some breakthrough, even with increased doses.      Review of Systems   Constitutional: Positive for fatigue. Negative for appetite change, chills, fever and unexpected weight change.   Respiratory: Negative for cough, choking, chest tightness, shortness of breath and wheezing.    Cardiovascular: Negative for chest pain, palpitations and leg swelling.   Gastrointestinal: Positive for abdominal pain and nausea. Negative for blood in stool, constipation, diarrhea and vomiting.   Genitourinary: Negative for dysuria, flank pain and hematuria.   Neurological: Negative for dizziness, seizures, syncope, speech difficulty, weakness, light-headedness, numbness and headaches.   Hematological: Does not bruise/bleed easily.        All pertinent negatives and positives are as above. All other systems have been reviewed and are negative unless otherwise stated.     Objective    Temp:  [97.6 °F (36.4 °C)-98.9 °F (37.2 °C)] 98.4 °F (36.9 °C)  Heart Rate:  [73-94] 78  Resp:  [18-20] 18  BP: (120-144)/(57-63) 139/62    Physical Exam   Constitutional: She appears well-developed and well-nourished.   HENT:   Head: Normocephalic and atraumatic.   Eyes: EOM are normal. Pupils are equal, round, and reactive to light.   Neck: Normal range of motion. Neck supple.   Cardiovascular: Normal rate, regular rhythm and normal heart sounds.  Exam reveals no gallop and no friction rub.    No murmur heard.  Pulmonary/Chest: Effort normal and breath sounds normal. No respiratory distress. She has no wheezes. She has no rales. She exhibits no tenderness.   Abdominal: Soft. Bowel sounds are normal. She exhibits no distension. There is no  guarding.   Musculoskeletal: She exhibits no edema.   Skin: Skin is warm and dry.   Psychiatric: She has a normal mood and affect. Her behavior is normal. Thought content normal.   Vitals reviewed.          Results Review:  I have reviewed the labs, radiology results, and diagnostic studies.    Laboratory Data:     Results from last 7 days  Lab Units 03/03/17  0522 03/02/17  0635 03/01/17  0014 02/28/17  1827   SODIUM mmol/L 134* 135* 129* 130*   POTASSIUM mmol/L 3.8 4.0 3.3* 3.1*   CHLORIDE mmol/L 103 103 98 92*   TOTAL CO2 mmol/L 24.0 24.0 26.0 27.0   BUN mg/dL 8 9 20 25*   CREATININE mg/dL 0.54 0.64 0.70 0.75   GLUCOSE mg/dL 82 82 97 90   CALCIUM mg/dL 8.6 8.4 8.2* 8.7   BILIRUBIN mg/dL  --  0.3 0.6 0.6   ALK PHOS U/L  --  66 73 88   ALT (SGPT) U/L  --  28 33 30   AST (SGOT) U/L  --  15 19 19   ANION GAP mmol/L 7.0 8.0 5.0 11.0     Estimated Creatinine Clearance: 69.8 mL/min (by C-G formula based on Cr of 0.54).            Results from last 7 days  Lab Units 03/03/17  0522 03/02/17  0635 03/01/17  1956 03/01/17  0736 03/01/17  0014 02/28/17  1827   WBC 10*3/mm3 7.59 6.66  --   --  8.19 11.41*   HEMOGLOBIN g/dL 9.1* 8.7* 8.7* 9.3* 9.3*  9.3* 10.8*   HEMATOCRIT % 27.4* 25.4* 26.0* 27.5* 27.2*  27.2* 31.6*   PLATELETS 10*3/mm3 248 207  --   --  193 206       Results from last 7 days  Lab Units 02/28/17  1827   INR  1.26*         Radiology Data:   Imaging Results (last 24 hours)     ** No results found for the last 24 hours. **          I have reviewed the patient current medications.     Assessment/Plan     Hospital Problem List     Hospice care          Plan:    1) Metastatic colon cancer:  Admitted to hospice.  Continue supportive care.  2) Abdominal pain, likely related to #1:  Since patient continues to have persistent pain, will increase fentanyl patch again today.  Will also add po morphine elixir to attempt to prepare her for going home.  Will keep the IV dilaudid for now.    3) Generalized weakness with  functional decline               Discharge Planning: I expect patient to be discharged to 1 in 2 days.    Mynor Ramesh MD   03/07/17   4:55 PM

## 2017-03-07 NOTE — DISCHARGE SUMMARY
"    [facility] Medicine Services  DISCHARGE SUMMARY       Date of Admission: 2/28/2017  Date of Discharge:  3/7/2017  Primary Care Physician: Kyler Kim MD    Presenting Problem/History of Present Illness:  Hypokalemia [E87.6]  Lower GI bleed [K92.2]  General weakness [R53.1]  Non-intractable vomiting with nausea, unspecified vomiting type [R11.2]  Lower GI bleed [K92.2]       Final Discharge Diagnoses:  Hospital Problem List     Lower GI bleed          Consults:   Consults     Date and Time Order Name Status Description    3/1/2017 1557 Inpatient Consult to Hematology & Oncology Completed           Procedures Performed:                 Pertinent Test Results: None    Chief Complaint on Day of Discharge: Abdominal pain    Hospital Course:  The patient is a 77 y.o. female who presented to [facility] with abdominal pain admitted for weakness with metastatic colon cancer. Patient was getting pain medication and Heme/Onc consult. Dr. Booth saw and evaluated the patient. He explained the prognosis to the patient and family. After a long discussion, patient decided to pursue Hospice. Hospice was consulted and patient would be admitted to inpatient hospice.       Condition on Discharge:  Poor    Physical Exam on Discharge:  Visit Vitals   • /64 (BP Location: Left arm, Patient Position: Lying)   • Pulse 83   • Temp 97.3 °F (36.3 °C) (Tympanic)   • Resp 18   • Ht 65\" (165.1 cm)   • Wt 202 lb (91.6 kg)   • SpO2 91%   • BMI 33.61 kg/m2     Physical Exam   Constitutional: She is oriented to person, place, and time. She appears well-developed and well-nourished.   Cardiovascular: Normal rate, regular rhythm and normal heart sounds.  Exam reveals no gallop and no friction rub.    No murmur heard.  Pulmonary/Chest: Effort normal and breath sounds normal. No respiratory distress. She has no wheezes. She has no rales.   Abdominal: Soft. Bowel sounds are normal. " There is tenderness.   Musculoskeletal: Normal range of motion. She exhibits no edema.   Neurological: She is alert and oriented to person, place, and time.   Skin: Skin is warm and dry.   Psychiatric: She has a normal mood and affect. Her behavior is normal.   Vitals reviewed.        Discharge Disposition:  Hospice/Medical Facility ([facility])    Discharge Medications:   Bernadette Camarena   Home Medication Instructions BRENTON:[id number]    Printed on:03/07/17 0626   Medication Information                      aspirin 81 MG EC tablet  Take 81 mg by mouth Every Other Day.             atorvastatin (LIPITOR) 20 MG tablet  Take 20 mg by mouth Every Evening.             diltiazem CD (CARDIZEM CD) 120 MG 24 hr capsule  Take 120 mg by mouth every 12 (twelve) hours.             escitalopram (LEXAPRO) 10 MG tablet  Take 10 mg by mouth daily.             hydrochlorothiazide (HYDRODIURIL) 25 MG tablet  Take 25 mg by mouth daily.             labetalol (NORMODYNE) 100 MG tablet  Take 100 mg by mouth 2 (two) times a day.             levothyroxine (SYNTHROID, LEVOTHROID) 100 MCG tablet  Take 1 tablet by mouth Daily.             megestrol (MEGACE) 40 MG/ML suspension  Take 10 mL by mouth Daily.             ondansetron (ZOFRAN) 4 MG tablet  Take 1 tablet by mouth 4 (Four) Times a Day As Needed for nausea or vomiting.             oxyCODONE (ROXICODONE) 5 MG immediate release tablet  Take 1 tablet by mouth Every 8 (Eight) Hours As Needed for moderate pain (4-6) or severe pain (7-10).             pantoprazole (PROTONIX) 40 MG EC tablet  Take 40 mg by mouth daily. Take every morning before breakfast                 Discharge Diet:     Activity at Discharge:     Discharge Care Plan/Instructions: Admit to hospice    Follow-up Appointments:   Future Appointments  Date Time Provider Department Center   6/21/2017 10:00 AM MD [facility] None   8/17/2017 10:30 AM SLEEP CLINIC [facility] None        Test Results Pending at Discharge:     Malcolm Li MD  03/07/17  6:26 AM    Time: Less than 30 minutes spent on discharge planning.

## 2017-03-07 NOTE — PLAN OF CARE
Problem: Dying Patient, Actively (Adult)  Goal: Comfort/Pain Control  Outcome: Ongoing (interventions implemented as appropriate)  Goal: Dying Process, Peace and Dignity  Outcome: Ongoing (interventions implemented as appropriate)    Problem: Grieving (Adult)  Goal: Knowledge of Grief and Grieving  Outcome: Ongoing (interventions implemented as appropriate)    Problem: Pain, Acute (Adult)  Goal: Acceptable Pain Control/Comfort Level  Outcome: Ongoing (interventions implemented as appropriate)    Problem: Patient Care Overview (Adult)  Goal: Plan of Care Review  Outcome: Ongoing (interventions implemented as appropriate)  Pt has been in pain; dilaudid given. Pt's face has been flushed this afternoon v/s WNL; will continue to monitor.

## 2017-03-08 NOTE — PLAN OF CARE
Problem: Dying Patient, Actively (Adult)  Goal: Comfort/Pain Control  Outcome: Ongoing (interventions implemented as appropriate)  Goal: Dying Process, Peace and Dignity  Outcome: Ongoing (interventions implemented as appropriate)    Problem: Grieving (Adult)  Goal: Knowledge of Grief and Grieving  Outcome: Ongoing (interventions implemented as appropriate)    Problem: Pain, Acute (Adult)  Goal: Acceptable Pain Control/Comfort Level  Outcome: Ongoing (interventions implemented as appropriate)    Problem: Patient Care Overview (Adult)  Goal: Plan of Care Review  Outcome: Ongoing (interventions implemented as appropriate)    03/08/17 1615   Coping/Psychosocial Response Interventions   Plan Of Care Reviewed With patient;family   Patient Care Overview   Progress declining   Outcome Evaluation   Outcome Summary/Follow up Plan Pt says her pain medication is not as effective in managing her pain control, assessing pain frequently.       Goal: Adult Individualization and Mutuality  Outcome: Ongoing (interventions implemented as appropriate)  Goal: Discharge Needs Assessment  Outcome: Ongoing (interventions implemented as appropriate)

## 2017-03-08 NOTE — PLAN OF CARE
Problem: Dying Patient, Actively (Adult)  Goal: Comfort/Pain Control  Outcome: Ongoing (interventions implemented as appropriate)  Goal: Dying Process, Peace and Dignity  Outcome: Ongoing (interventions implemented as appropriate)    Problem: Grieving (Adult)  Goal: Knowledge of Grief and Grieving  Outcome: Ongoing (interventions implemented as appropriate)    Problem: Pain, Acute (Adult)  Goal: Acceptable Pain Control/Comfort Level  Outcome: Ongoing (interventions implemented as appropriate)    Problem: Patient Care Overview (Adult)  Goal: Plan of Care Review    03/08/17 0437   Outcome Evaluation   Outcome Summary/Follow up Plan Pain has been managed with IV medication. Pt resting more comfortably since last medication change.

## 2017-03-08 NOTE — PROGRESS NOTES
[facility] Medicine Services  INPATIENT PROGRESS NOTE    Length of Stay: 5  Date of Admission: 3/3/2017  Primary Care Physician: Kyler Kim MD    Subjective   Chief Complaint: Abdominal Pain  HPI:  States that she is still having pretty constant abdominal pain.  She states that she feels like the pain is actually worsening in spite of increased pain meds.      Review of Systems   Constitutional: Positive for fatigue. Negative for appetite change, chills, fever and unexpected weight change.   Respiratory: Negative for cough, choking, chest tightness, shortness of breath and wheezing.    Cardiovascular: Negative for chest pain, palpitations and leg swelling.   Gastrointestinal: Positive for abdominal pain and nausea. Negative for blood in stool, constipation, diarrhea and vomiting.   Genitourinary: Negative for dysuria, flank pain and hematuria.   Neurological: Negative for dizziness, seizures, syncope, speech difficulty, weakness, light-headedness, numbness and headaches.   Hematological: Does not bruise/bleed easily.        All pertinent negatives and positives are as above. All other systems have been reviewed and are negative unless otherwise stated.     Objective    Temp:  [97.9 °F (36.6 °C)-98.9 °F (37.2 °C)] 98.8 °F (37.1 °C)  Heart Rate:  [75-94] 76  Resp:  [18] 18  BP: (120-139)/(57-66) 130/66    Physical Exam   Constitutional: She appears well-developed and well-nourished.   HENT:   Head: Normocephalic and atraumatic.   Eyes: EOM are normal. Pupils are equal, round, and reactive to light.   Neck: Normal range of motion. Neck supple.   Cardiovascular: Normal rate, regular rhythm and normal heart sounds.  Exam reveals no gallop and no friction rub.    No murmur heard.  Pulmonary/Chest: Effort normal and breath sounds normal. No respiratory distress. She has no wheezes. She has no rales. She exhibits no tenderness.   Abdominal: Soft. Bowel sounds are normal. She  exhibits no distension. There is no guarding.   Musculoskeletal: She exhibits no edema.   Skin: Skin is warm and dry.   Psychiatric: She has a normal mood and affect. Her behavior is normal. Thought content normal.   Vitals reviewed.          Results Review:  I have reviewed the labs, radiology results, and diagnostic studies.    Laboratory Data:     Results from last 7 days  Lab Units 03/03/17  0522 03/02/17  0635   SODIUM mmol/L 134* 135*   POTASSIUM mmol/L 3.8 4.0   CHLORIDE mmol/L 103 103   TOTAL CO2 mmol/L 24.0 24.0   BUN mg/dL 8 9   CREATININE mg/dL 0.54 0.64   GLUCOSE mg/dL 82 82   CALCIUM mg/dL 8.6 8.4   BILIRUBIN mg/dL  --  0.3   ALK PHOS U/L  --  66   ALT (SGPT) U/L  --  28   AST (SGOT) U/L  --  15   ANION GAP mmol/L 7.0 8.0     Estimated Creatinine Clearance: 69.8 mL/min (by C-G formula based on Cr of 0.54).            Results from last 7 days  Lab Units 03/03/17  0522 03/02/17  0635 03/01/17  1956   WBC 10*3/mm3 7.59 6.66  --    HEMOGLOBIN g/dL 9.1* 8.7* 8.7*   HEMATOCRIT % 27.4* 25.4* 26.0*   PLATELETS 10*3/mm3 248 207  --              Radiology Data:   Imaging Results (last 24 hours)     ** No results found for the last 24 hours. **          I have reviewed the patient current medications.     Assessment/Plan     Hospital Problem List     Hospice care          Plan:    1) Metastatic colon cancer:  Admitted to hospice.  Continue supportive care.  2) Abdominal pain, likely related to #1:  Since patient continues to have persistent pain, increased fentanyl patch yesterday.  Will increase po morphine elixir, and increase the IV dilaudid.  If she is still having constant pain, will consider PCA until appropriate analgesia can be obtained.    3) Generalized weakness with functional decline               Discharge Planning: I expect patient to be discharged to 1 in 2 days.    Mynor Ramesh MD   03/08/17   8:46 AM

## 2017-03-09 NOTE — PROGRESS NOTES
[facility] Medicine Services  INPATIENT PROGRESS NOTE    Length of Stay: 6  Date of Admission: 3/3/2017  Primary Care Physician: Kyler Kim MD    Subjective   Chief Complaint: Abdominal pain, anxiety, nausea  HPI:  77 year old with metastatic colon cancer admitted to hospice.  She complains of uncontrolled abdominal pain, nausea, and anxiety.  She reported hallucinations with ativan.  Plan is to start on PCA to obtain pain control.    Review of Systems   Constitutional: Negative for chills and fever.   Respiratory: Negative for shortness of breath.    Cardiovascular: Negative for chest pain.   Gastrointestinal: Positive for abdominal pain and nausea. Negative for diarrhea and vomiting.   Psychiatric/Behavioral: Positive for hallucinations (with ativan). The patient is nervous/anxious.       All pertinent negatives and positives are as above. All other systems have been reviewed and are negative unless otherwise stated.     Objective    Temp:  [96.3 °F (35.7 °C)-97.6 °F (36.4 °C)] 97.2 °F (36.2 °C)  Heart Rate:  [80-90] 82  Resp:  [18] 18  BP: (122-152)/(54-64) 134/58    Physical Exam   Constitutional: She is oriented to person, place, and time. She appears well-developed and well-nourished.   HENT:   Head: Normocephalic and atraumatic.   Eyes: Conjunctivae and EOM are normal.   Neck: Normal range of motion. Neck supple.   Cardiovascular: Normal rate, regular rhythm, normal heart sounds and intact distal pulses.  Exam reveals no gallop and no friction rub.    No murmur heard.  Pulmonary/Chest: Effort normal and breath sounds normal. No respiratory distress. She has no wheezes. She has no rales.   Abdominal: Soft. Bowel sounds are normal. She exhibits no distension. There is tenderness.   Musculoskeletal: Normal range of motion. She exhibits no edema.   Neurological: She is alert and oriented to person, place, and time.   Skin: Skin is warm and dry. No erythema.        Results Review:  I have reviewed the labs, radiology results, and diagnostic studies.    Laboratory Data:     Results from last 7 days  Lab Units 03/03/17  0522   SODIUM mmol/L 134*   POTASSIUM mmol/L 3.8   CHLORIDE mmol/L 103   TOTAL CO2 mmol/L 24.0   BUN mg/dL 8   CREATININE mg/dL 0.54   GLUCOSE mg/dL 82   CALCIUM mg/dL 8.6   ANION GAP mmol/L 7.0     Estimated Creatinine Clearance: 69.8 mL/min (by C-G formula based on Cr of 0.54).            Results from last 7 days  Lab Units 03/03/17  0522   WBC 10*3/mm3 7.59   HEMOGLOBIN g/dL 9.1*   HEMATOCRIT % 27.4*   PLATELETS 10*3/mm3 248           Culture Data:   No results found for: BLOODCX  No results found for: URINECX  No results found for: RESPCX  No results found for: WOUNDCX  No results found for: STOOLCX  No components found for: BODYFLD    Radiology Data:   Imaging Results (last 24 hours)     ** No results found for the last 24 hours. **          I have reviewed the patient current medications.     Assessment/Plan     Hospital Problem List     Malignant neoplasm of hepatic flexure    Abdominal wall mass    Metastatic colon cancer in female    Nausea with vomiting    Hospice care          Plan:   Dilaudid PCA  Zofran/Phenergan  Change ativan to valium  Hospice following        Kimani Estrada, APRN   03/09/17   1:44 PM

## 2017-03-09 NOTE — PLAN OF CARE
Problem: Dying Patient, Actively (Adult)  Goal: Comfort/Pain Control  Outcome: Ongoing (interventions implemented as appropriate)    03/08/17 1615   Dying Patient, Actively (Adult)   Comfort/Pain Control making progress toward outcome       Goal: Dying Process, Peace and Dignity  Outcome: Ongoing (interventions implemented as appropriate)    Problem: Grieving (Adult)  Goal: Knowledge of Grief and Grieving  Outcome: Ongoing (interventions implemented as appropriate)    Problem: Pain, Acute (Adult)  Goal: Acceptable Pain Control/Comfort Level  Outcome: Ongoing (interventions implemented as appropriate)    Problem: Patient Care Overview (Adult)  Goal: Plan of Care Review  Outcome: Ongoing (interventions implemented as appropriate)  Goal: Adult Individualization and Mutuality  Outcome: Ongoing (interventions implemented as appropriate)  Goal: Discharge Needs Assessment  Outcome: Ongoing (interventions implemented as appropriate)

## 2017-03-10 NOTE — PROGRESS NOTES
[facility] Medicine Services  INPATIENT PROGRESS NOTE    Length of Stay: 7  Date of Admission: 3/3/2017  Primary Care Physician: Kyler Kim MD    Subjective   Chief Complaint: Abdominal pain improved, nausea  HPI:  77 year old with metastatic colon cancer admitted to hospice.  She complains of uncontrolled abdominal pain, nausea, and anxiety.  She reported hallucinations with ativan.     Pain control is improved on PCA, more sedated.  Complains of nausea with moving.    Review of Systems   Constitutional: Negative for chills and fever.   Respiratory: Negative for shortness of breath.    Cardiovascular: Negative for chest pain.   Gastrointestinal: Positive for abdominal pain (improved, much better control) and nausea (with moving). Negative for diarrhea and vomiting.   Psychiatric/Behavioral: Negative for hallucinations. The patient is not nervous/anxious.       All pertinent negatives and positives are as above. All other systems have been reviewed and are negative unless otherwise stated.     Objective    Temp:  [97.1 °F (36.2 °C)-98.3 °F (36.8 °C)] 98.3 °F (36.8 °C)  Heart Rate:  [90-97] 92  Resp:  [18] 18  BP: (138-159)/(67-90) 159/69    Physical Exam   Constitutional: She appears well-developed and well-nourished. She appears lethargic.   HENT:   Head: Normocephalic and atraumatic.   Eyes: Conjunctivae and EOM are normal.   Neck: Normal range of motion. Neck supple.   Cardiovascular: Normal rate, regular rhythm, normal heart sounds and intact distal pulses.  Exam reveals no gallop and no friction rub.    No murmur heard.  Pulmonary/Chest: Effort normal and breath sounds normal. No respiratory distress. She has no wheezes. She has no rales.   Abdominal: Soft. Bowel sounds are normal. She exhibits no distension. There is tenderness.   Neurological: She appears lethargic.   Skin: Skin is warm and dry. No erythema.       Results Review:  I have reviewed the labs,  radiology results, and diagnostic studies.    Laboratory Data:         Invalid input(s): LABALBU, PROT  Estimated Creatinine Clearance: 69.8 mL/min (by C-G formula based on Cr of 0.54).                    Culture Data:   No results found for: BLOODCX  No results found for: URINECX  No results found for: RESPCX  No results found for: WOUNDCX  No results found for: STOOLCX  No components found for: BODYFLD    Radiology Data:   Imaging Results (last 24 hours)     ** No results found for the last 24 hours. **          I have reviewed the patient current medications.     Assessment/Plan     Hospital Problem List     Malignant neoplasm of hepatic flexure    Abdominal wall mass    Metastatic colon cancer in female    Nausea with vomiting    Hospice care          Plan:   Dilaudid PCA  Zofran/Phenergan, increase phenergan to 25 mg q 6 prn  Add scopolamin  Valium PRN  Hospice following        Kimani Estrada, APRN   03/10/17   10:25 AM

## 2017-03-10 NOTE — PLAN OF CARE
Problem: Dying Patient, Actively (Adult)  Goal: Dying Process, Peace and Dignity  Outcome: Ongoing (interventions implemented as appropriate)    03/10/17 0647   Dying Patient, Actively (Adult)   Dying Process, Peace and Dignity making progress toward outcome         Problem: Grieving (Adult)  Goal: Knowledge of Grief and Grieving  Outcome: Ongoing (interventions implemented as appropriate)    Problem: Pain, Acute (Adult)  Goal: Acceptable Pain Control/Comfort Level  Outcome: Ongoing (interventions implemented as appropriate)    Problem: Patient Care Overview (Adult)  Goal: Plan of Care Review  Outcome: Ongoing (interventions implemented as appropriate)

## 2017-03-10 NOTE — PLAN OF CARE
Problem: Dying Patient, Actively (Adult)  Goal: Comfort/Pain Control  Outcome: Ongoing (interventions implemented as appropriate)  Goal: Dying Process, Peace and Dignity  Outcome: Ongoing (interventions implemented as appropriate)    Problem: Grieving (Adult)  Goal: Knowledge of Grief and Grieving  Outcome: Ongoing (interventions implemented as appropriate)    Problem: Pain, Acute (Adult)  Goal: Acceptable Pain Control/Comfort Level  Outcome: Ongoing (interventions implemented as appropriate)    Problem: Patient Care Overview (Adult)  Goal: Plan of Care Review  Outcome: Ongoing (interventions implemented as appropriate)  Pt started on Dilaudid PCA; pt has not been in pain and reports it is helping; will continue to monitor.

## 2017-03-11 NOTE — PLAN OF CARE
Problem: Dying Patient, Actively (Adult)  Goal: Comfort/Pain Control  Outcome: Ongoing (interventions implemented as appropriate)  Goal: Dying Process, Peace and Dignity  Outcome: Ongoing (interventions implemented as appropriate)    Problem: Grieving (Adult)  Goal: Knowledge of Grief and Grieving  Outcome: Ongoing (interventions implemented as appropriate)    Problem: Pain, Acute (Adult)  Goal: Acceptable Pain Control/Comfort Level  Outcome: Ongoing (interventions implemented as appropriate)

## 2017-03-11 NOTE — PLAN OF CARE
Problem: Dying Patient, Actively (Adult)  Goal: Comfort/Pain Control  Outcome: Ongoing (interventions implemented as appropriate)  Goal: Dying Process, Peace and Dignity  Outcome: Ongoing (interventions implemented as appropriate)    Problem: Grieving (Adult)  Goal: Knowledge of Grief and Grieving  Outcome: Ongoing (interventions implemented as appropriate)    Problem: Pain, Acute (Adult)  Goal: Acceptable Pain Control/Comfort Level  Outcome: Ongoing (interventions implemented as appropriate)    Problem: Patient Care Overview (Adult)  Goal: Plan of Care Review  Outcome: Ongoing (interventions implemented as appropriate)    03/10/17 1825   Coping/Psychosocial Response Interventions   Plan Of Care Reviewed With family   Patient Care Overview   Progress no change

## 2017-03-11 NOTE — PROGRESS NOTES
[facility] Medicine Services  INPATIENT PROGRESS NOTE    Length of Stay: 8  Date of Admission: 3/3/2017  Primary Care Physician: Kyler Kim MD    Subjective   Chief Complaint: Abdominal pain improved, nausea  HPI:  77 year old with metastatic colon cancer admitted to hospice.  She complains of uncontrolled abdominal pain, nausea, and anxiety.  She reported hallucinations with ativan.     Pain control is improved on PCA, more sedated.  Family reports increased anxiety and nausea still not under control.    Review of Systems   Constitutional: Negative for chills and fever.   Respiratory: Negative for shortness of breath.    Cardiovascular: Negative for chest pain.   Gastrointestinal: Positive for abdominal pain (improved, much better control) and nausea (with moving). Negative for diarrhea and vomiting.   Psychiatric/Behavioral: Negative for hallucinations. The patient is nervous/anxious.       All pertinent negatives and positives are as above. All other systems have been reviewed and are negative unless otherwise stated.     Objective    Temp:  [96.9 °F (36.1 °C)-98 °F (36.7 °C)] 97.4 °F (36.3 °C)  Heart Rate:  [75-82] 82  Resp:  [18-19] 19  BP: (120-181)/(58-89) 181/79    Physical Exam   Constitutional: She appears well-developed and well-nourished. She appears lethargic.   HENT:   Head: Normocephalic and atraumatic.   Eyes: Conjunctivae and EOM are normal.   Neck: Normal range of motion. Neck supple.   Cardiovascular: Normal rate, regular rhythm, normal heart sounds and intact distal pulses.  Exam reveals no gallop and no friction rub.    No murmur heard.  Pulmonary/Chest: Effort normal and breath sounds normal. No respiratory distress. She has no wheezes. She has no rales.   Abdominal: Soft. Bowel sounds are normal. She exhibits no distension. There is tenderness.   Neurological: She appears lethargic.   Skin: Skin is warm and dry. No erythema.       Results  Review:  I have reviewed the labs, radiology results, and diagnostic studies.    Laboratory Data:         Invalid input(s): LABALBU, PROT  Estimated Creatinine Clearance: 70.5 mL/min (by C-G formula based on Cr of 0.54).                    Culture Data:   No results found for: BLOODCX  No results found for: URINECX  No results found for: RESPCX  No results found for: WOUNDCX  No results found for: STOOLCX  No components found for: BODYFLD    Radiology Data:   Imaging Results (last 24 hours)     ** No results found for the last 24 hours. **          I have reviewed the patient current medications.     Assessment/Plan     Hospital Problem List     Malignant neoplasm of hepatic flexure    Abdominal wall mass    Metastatic colon cancer in female    Nausea with vomiting    Hospice care          Plan:   Dilaudid PCA  Zofran/Phenergan, zofran to 8 mg q 4 hours scheduled  Add scopolamin  Increase PRN valium to 4 mg q 4 hrs  Hospice following        Kimani Estrada, NILESH   03/11/17   10:40 AM

## 2017-03-12 NOTE — PLAN OF CARE
Problem: Dying Patient, Actively (Adult)  Goal: Comfort/Pain Control  Outcome: Outcome(s) achieved Date Met:  03/11/17  Goal: Dying Process, Peace and Dignity  Outcome: Ongoing (interventions implemented as appropriate)    Problem: Grieving (Adult)  Goal: Knowledge of Grief and Grieving  Outcome: Ongoing (interventions implemented as appropriate)    Problem: Pain, Acute (Adult)  Goal: Acceptable Pain Control/Comfort Level  Outcome: Ongoing (interventions implemented as appropriate)    Problem: Patient Care Overview (Adult)  Goal: Plan of Care Review  Outcome: Ongoing (interventions implemented as appropriate)    03/11/17 1816   Coping/Psychosocial Response Interventions   Plan Of Care Reviewed With patient   Patient Care Overview   Progress declining   Outcome Evaluation   Outcome Summary/Follow up Plan pt declining. family needing further education on dying education. pt had prn morphine.       Goal: Adult Individualization and Mutuality  Outcome: Ongoing (interventions implemented as appropriate)

## 2017-03-12 NOTE — PROGRESS NOTES
[facility] Medicine Services  INPATIENT PROGRESS NOTE    Length of Stay: 9  Date of Admission: 3/3/2017  Primary Care Physician: Kyler Kim MD    Subjective   Chief Complaint: Abdominal pain improved, nausea  HPI:  77 year old with metastatic colon cancer admitted to hospice.  She complains of uncontrolled abdominal pain, nausea, and anxiety.  She reported hallucinations with ativan.     Symptoms became uncontrolled last night.  PCA rate increased.  Valium changed for ativan.  Nausea is reported has better controlled today.    Review of Systems   Constitutional: Negative for chills and fever.   Respiratory: Negative for shortness of breath.    Cardiovascular: Negative for chest pain.   Gastrointestinal: Positive for abdominal pain (improved, much better control) and nausea (with moving). Negative for diarrhea and vomiting.   Psychiatric/Behavioral: Negative for hallucinations. The patient is not nervous/anxious.       All pertinent negatives and positives are as above. All other systems have been reviewed and are negative unless otherwise stated.     Objective    Temp:  [97.4 °F (36.3 °C)-99.2 °F (37.3 °C)] 99.2 °F (37.3 °C)  Heart Rate:  [] 101  Resp:  [18-22] 18  BP: (139-177)/(69-74) 177/74    Physical Exam   Constitutional: She appears well-developed and well-nourished. She appears lethargic.   HENT:   Head: Normocephalic and atraumatic.   Eyes: Conjunctivae and EOM are normal.   Neck: Normal range of motion. Neck supple.   Cardiovascular: Normal rate, regular rhythm, normal heart sounds and intact distal pulses.  Exam reveals no gallop and no friction rub.    No murmur heard.  Pulmonary/Chest: Effort normal and breath sounds normal. No respiratory distress. She has no wheezes. She has no rales.   Abdominal: Soft. Bowel sounds are normal. She exhibits no distension. There is tenderness.   Neurological: She appears lethargic.   Skin: Skin is warm and dry. No  erythema.       Results Review:  I have reviewed the labs, radiology results, and diagnostic studies.    Laboratory Data:         Invalid input(s): LABALBU, PROT  Estimated Creatinine Clearance: 70.5 mL/min (by C-G formula based on Cr of 0.54).                    Culture Data:   No results found for: BLOODCX  No results found for: URINECX  No results found for: RESPCX  No results found for: WOUNDCX  No results found for: STOOLCX  No components found for: BODYFLD    Radiology Data:   Imaging Results (last 24 hours)     ** No results found for the last 24 hours. **          I have reviewed the patient current medications.     Assessment/Plan     Hospital Problem List     Malignant neoplasm of hepatic flexure    Abdominal wall mass    Metastatic colon cancer in female    Nausea with vomiting    Hospice care          Plan:   Dilaudid PCA increased overnight  [facility]        Kimani Estrada, APRN   03/12/17   1:32 PM

## 2017-03-12 NOTE — PLAN OF CARE
Problem: Dying Patient, Actively (Adult)  Goal: Dying Process, Peace and Dignity  Outcome: Ongoing (interventions implemented as appropriate)    Problem: Grieving (Adult)  Goal: Knowledge of Grief and Grieving  Outcome: Ongoing (interventions implemented as appropriate)    Problem: Pain, Acute (Adult)  Goal: Acceptable Pain Control/Comfort Level  Outcome: Ongoing (interventions implemented as appropriate)

## 2017-03-12 NOTE — NURSING NOTE
Hospice nurse Richelle AMADO called this nurse with family concern, that patient didn't get a wagner and ativan wasn't ordered this day. She also stated that family was also concerned about this matter and was coming to speak with this nurse. This nurse explained to Richelle AMADO that there was a pending order for the wagner, but the family has been hesitant with okaying. I also explained to Richelle AMADO that while ativan was not ordered this day, the valium was adjust per ACNP to benefit the patient. Another reason that ativan was not ordered after speaking to the family, was because of the patient being terrified of the drug after she saw her mother have an adverse reaction.This nurse made Richelle AMADO aware of  the matter and that concerns would be reviewed with the MD and family for changes per this nurse. After reviewing the plan of care with the family and doctor Anjelica, ativan 2 mg q2hr prn was added, the dilaudid pca + cont was increased and atropine drops were added. Family happy and in agreement with changes. Richelle AMADO made aware of changes and voiced agreement with this nurse. Will call MD if future adjustments needed.

## 2017-03-13 NOTE — PROGRESS NOTES
[facility] Medicine Services  INPATIENT PROGRESS NOTE    Length of Stay: 10  Date of Admission: 3/3/2017  Primary Care Physician: Kyler Kim MD    Subjective   Chief Complaint: Abdominal pain improved, nausea  HPI:  77 year old with metastatic colon cancer admitted to hospice.  She complains of uncontrolled abdominal pain, nausea, and anxiety.     Symptoms controlled today.  Sleeping more.  Not eating.    Review of Systems   Unable to perform ROS: Other   Sedated, comfort measures      Objective    Temp:  [97.8 °F (36.6 °C)-99.1 °F (37.3 °C)] 97.8 °F (36.6 °C)  Heart Rate:  [102-112] 102  Resp:  [16] 16  BP: (114-138)/(56-72) 134/72    Physical Exam   Constitutional: She appears well-developed and well-nourished. She appears lethargic.   HENT:   Head: Normocephalic and atraumatic.   Eyes: Conjunctivae and EOM are normal.   Neck: Normal range of motion. Neck supple.   Cardiovascular: Normal rate, regular rhythm, normal heart sounds and intact distal pulses.  Exam reveals no gallop and no friction rub.    No murmur heard.  Pulmonary/Chest: Effort normal and breath sounds normal. No respiratory distress. She has no wheezes. She has no rales.   Abdominal: Soft. Bowel sounds are normal. She exhibits no distension.   Neurological: She appears lethargic.   Skin: Skin is warm and dry. No erythema.       Results Review:  I have reviewed the labs, radiology results, and diagnostic studies.    Laboratory Data:         Invalid input(s): LABALBU, PROT  Estimated Creatinine Clearance: 70.5 mL/min (by C-G formula based on Cr of 0.54).                    Culture Data:   No results found for: BLOODCX  No results found for: URINECX  No results found for: RESPCX  No results found for: WOUNDCX  No results found for: STOOLCX  No components found for: BODYFLD    Radiology Data:   Imaging Results (last 24 hours)     ** No results found for the last 24 hours. **          I have reviewed the  patient current medications.     Assessment/Plan     Hospital Problem List     Malignant neoplasm of hepatic flexure    Abdominal wall mass    Metastatic colon cancer in female    Nausea with vomiting    Hospice care          Plan:   Dilaudid PCA   [facility]  [facility]        Kimani Estrada, APRN   03/13/17   10:40 AM

## 2017-03-14 NOTE — PROGRESS NOTES
[facility] Medicine Services  INPATIENT PROGRESS NOTE    Length of Stay: 11  Date of Admission: 3/3/2017  Primary Care Physician: Kyler Kim MD    Subjective   Chief Complaint: Abdominal pain improved, nausea  HPI:  77 year old with metastatic colon cancer admitted to hospice.  She complains of uncontrolled abdominal pain, nausea, and anxiety.     Symptoms remain controlled.  Requiring more frequent ativan.    Review of Systems   Unable to perform ROS: Other   Sedated, comfort measures      Objective    Temp:  [98 °F (36.7 °C)-98.2 °F (36.8 °C)] 98.2 °F (36.8 °C)  Heart Rate:  [] 107  Resp:  [16] 16  BP: (121-136)/(67-72) 136/72    Physical Exam   Constitutional: She appears well-developed and well-nourished. She appears lethargic.   HENT:   Head: Normocephalic and atraumatic.   Eyes: Conjunctivae and EOM are normal.   Neck: Normal range of motion. Neck supple.   Cardiovascular: Normal rate, regular rhythm, normal heart sounds and intact distal pulses.  Exam reveals no gallop and no friction rub.    No murmur heard.  Pulmonary/Chest: Effort normal and breath sounds normal. No respiratory distress. She has no wheezes. She has no rales.   Abdominal: Soft. Bowel sounds are normal. She exhibits no distension.   Neurological: She appears lethargic.   Skin: Skin is warm and dry. No erythema.       Results Review:  I have reviewed the labs, radiology results, and diagnostic studies.    Laboratory Data:         Invalid input(s): LABALBU, PROT  Estimated Creatinine Clearance: 70.5 mL/min (by C-G formula based on Cr of 0.54).                    Culture Data:   No results found for: BLOODCX  No results found for: URINECX  No results found for: RESPCX  No results found for: WOUNDCX  No results found for: STOOLCX  No components found for: BODYFLD    Radiology Data:   Imaging Results (last 24 hours)     ** No results found for the last 24 hours. **          I have reviewed  the patient current medications.     Assessment/Plan     Hospital Problem List     Malignant neoplasm of hepatic flexure    Abdominal wall mass    Metastatic colon cancer in female    Nausea with vomiting    Hospice care          Plan:   Dilaudid PCA   Zofran scheduled  Add scheduled ativan, continue PRN ativan   Hospice following  Discussed with family      Kimani Estrada, APRN   03/14/17   10:19 AM

## 2017-03-15 NOTE — NURSING NOTE
Interventions for pain, nausea, and anxiety effective.  Continuing with plan of care, family remains @ bs.

## 2017-03-15 NOTE — PLAN OF CARE
Problem: Dying Patient, Actively (Adult)  Goal: Dying Process, Peace and Dignity  Outcome: Ongoing (interventions implemented as appropriate)    Problem: Grieving (Adult)  Goal: Knowledge of Grief and Grieving  Outcome: Ongoing (interventions implemented as appropriate)    Problem: Pain, Acute (Adult)  Goal: Acceptable Pain Control/Comfort Level  Outcome: Ongoing (interventions implemented as appropriate)    Problem: Patient Care Overview (Adult)  Goal: Plan of Care Review  Outcome: Ongoing (interventions implemented as appropriate)    03/15/17 0515   Coping/Psychosocial Response Interventions   Plan Of Care Reviewed With family   Patient Care Overview   Progress declining   Outcome Evaluation   Outcome Summary/Follow up Plan Pt has not exhibited s/s of air hunger, anxiety, or pain; family needs reinforcement of death and dying education; gave all ativan as scheduled; vital signs stable; will continue to monitor       Goal: Adult Individualization and Mutuality  Outcome: Ongoing (interventions implemented as appropriate)  Goal: Discharge Needs Assessment  Outcome: Ongoing (interventions implemented as appropriate)

## 2017-03-15 NOTE — PROGRESS NOTES
[facility] Medicine Services  INPATIENT PROGRESS NOTE    Length of Stay: 12  Date of Admission: 3/3/2017  Primary Care Physician: Kyler Kim MD    Subjective   Chief Complaint: Hospice care  HPI:  77 year old with metastatic colon cancer admitted to hospice.  She complains of uncontrolled abdominal pain, nausea, and anxiety.     Sleeping. Respiratory rate is decreased.  Family reports occasional grimace.    Review of Systems   Unable to perform ROS: Other   Sedated, comfort measures      Objective    Temp:  [97.5 °F (36.4 °C)-97.7 °F (36.5 °C)] 97.7 °F (36.5 °C)  Heart Rate:  [113-114] 114  Resp:  [11-12] 12  BP: (139-157)/(63-71) 157/71    Physical Exam   Constitutional: She appears well-developed and well-nourished. She appears lethargic.   HENT:   Head: Normocephalic and atraumatic.   Eyes: Conjunctivae and EOM are normal.   Neck: Normal range of motion. Neck supple.   Cardiovascular: Normal rate, regular rhythm, normal heart sounds and intact distal pulses.  Exam reveals no gallop and no friction rub.    No murmur heard.  Pulmonary/Chest: Effort normal. No respiratory distress. She has no wheezes. She has rales.   Abdominal: Soft. Bowel sounds are normal. She exhibits no distension.   Neurological: She appears lethargic.   Skin: Skin is warm and dry. No erythema.       Results Review:  I have reviewed the labs, radiology results, and diagnostic studies.    Laboratory Data:         Invalid input(s): LABALBU, PROT  Estimated Creatinine Clearance: 70.5 mL/min (by C-G formula based on Cr of 0.54).                    Culture Data:   No results found for: BLOODCX  No results found for: URINECX  No results found for: RESPCX  No results found for: WOUNDCX  No results found for: STOOLCX  No components found for: BODYFLD    Radiology Data:   Imaging Results (last 24 hours)     ** No results found for the last 24 hours. **          I have reviewed the patient current  medications.     Assessment/Plan     Hospital Problem List     Malignant neoplasm of hepatic flexure    Abdominal wall mass    Metastatic colon cancer in female    Nausea with vomiting    Hospice care          Plan:   Dilaudid PCA   Zofran scheduled  Ativan scheduled/PRN  Hospice following  Discussed with family      Kimani Estrada, APRN   03/15/17   1:45 PM

## 2017-03-16 NOTE — NURSING NOTE
Continue to monitor pt response to PCA dilaudid. Pt resting, pt grimaces and moans when moved, but is quiet when untouched. Pt appears comfortable after q 4 ativan given. Plan of care discussed with pt family.

## 2017-03-16 NOTE — PROGRESS NOTES
[facility] Medicine Services  INPATIENT PROGRESS NOTE    Length of Stay: 13  Date of Admission: 3/3/2017  Primary Care Physician: Kyler Kim MD    Subjective   Chief Complaint: no complaints  HPI:  77 year old female who is admitted for hospice care related to metastatic colon cancer.  She has no complaints during today's visit and is resting quietly.     Review of Systems   Unable to perform ROS: Patient unresponsive        All pertinent negatives and positives are as above. All other systems have been reviewed and are negative unless otherwise stated.     Objective    Temp:  [97.3 °F (36.3 °C)] 97.3 °F (36.3 °C)  Heart Rate:  [120] 120  Resp:  [16] 16  BP: (129)/(62) 129/62    Physical Exam   Constitutional: She appears well-developed and well-nourished.   HENT:   Head: Normocephalic.   Eyes: Conjunctivae are normal.   Neck: Neck supple.   Cardiovascular: Normal heart sounds and intact distal pulses.  Tachycardia present.  Exam reveals no gallop and no friction rub.    No murmur heard.  Pulses:       Posterior tibial pulses are 1+ on the right side, and 1+ on the left side.   Pulmonary/Chest: Effort normal.   Abdominal: Bowel sounds are normal.   Skin: Skin is warm and dry.   Vitals reviewed.          Results Review:  I have reviewed the labs, radiology results, and diagnostic studies.    Laboratory Data:         Invalid input(s): LABALBU, PROT  Estimated Creatinine Clearance: 70.5 mL/min (by C-G formula based on Cr of 0.54).                    Culture Data:   No results found for: BLOODCX  No results found for: URINECX  No results found for: RESPCX  No results found for: WOUNDCX  No results found for: STOOLCX  No components found for: BODYFLD    Radiology Data:   Imaging Results (last 24 hours)     ** No results found for the last 24 hours. **          I have reviewed the patient current medications.     Assessment/Plan     Hospital Problem List     Malignant  neoplasm of hepatic flexure    Abdominal wall mass    Metastatic colon cancer in female    Nausea with vomiting    Hospice care          Plan:    1. Hospice/end of life care: Patient's pain and agitation well controlled with current medications.  Family reports patient rested well overnight with no issues.  Continue current treatment plan and adjust medications as needed.       Trinidad Devine, NILESH   03/16/17   11:02 AM

## 2017-03-17 NOTE — NURSING NOTE
Continuing to monitor pt response to PCA continuous Dilaudid and scheduled ativan. Respirations, saturations monitored. Pt resting, appears asleep except for one episode at 1945, 03/16/2017. Pt became restless, breath sounds gurgling. PRN morphine given for air hunger with positive results.

## 2017-03-17 NOTE — PLAN OF CARE
Problem: Dying Patient, Actively (Adult)  Goal: Dying Process, Peace and Dignity  Outcome: Ongoing (interventions implemented as appropriate)    Problem: Grieving (Adult)  Goal: Knowledge of Grief and Grieving  Outcome: Ongoing (interventions implemented as appropriate)    Problem: Pain, Acute (Adult)  Goal: Acceptable Pain Control/Comfort Level  Outcome: Ongoing (interventions implemented as appropriate)    Problem: Patient Care Overview (Adult)  Goal: Plan of Care Review  Outcome: Ongoing (interventions implemented as appropriate)    03/17/17 0647   Coping/Psychosocial Response Interventions   Plan Of Care Reviewed With family   Patient Care Overview   Progress declining   Outcome Evaluation   Outcome Summary/Follow up Plan pt respirations labored, gurgling, suction used per pt comfort, atropine drops given, scjeduled ativan given. Continuous dilaudid PCA effective pain control.

## 2017-03-17 NOTE — PROGRESS NOTES
[facility] Medicine Services  INPATIENT PROGRESS NOTE    Length of Stay: 14  Date of Admission: 3/3/2017  Primary Care Physician: Kyler Kim MD    Subjective   Chief Complaint: no complaints  HPI:  77 year old female who is admitted for hospice care related to metastatic colon cancer.  She has no complaints during today's visit and is resting quietly.     Review of Systems   Unable to perform ROS: Patient unresponsive        All pertinent negatives and positives are as above. All other systems have been reviewed and are negative unless otherwise stated.     Objective    Temp:  [96.7 °F (35.9 °C)-102.2 °F (39 °C)] 102.2 °F (39 °C)  Heart Rate:  [132-145] 145  Resp:  [18-24] 24  BP: (155)/(77-88) 155/88    Physical Exam   Constitutional: She appears well-developed and well-nourished.   HENT:   Head: Normocephalic.   Eyes: Conjunctivae are normal.   Neck: Neck supple.   Cardiovascular: Normal heart sounds and intact distal pulses.  Tachycardia present.  Exam reveals no gallop and no friction rub.    No murmur heard.  Pulses:       Posterior tibial pulses are 1+ on the right side, and 1+ on the left side.   Pulmonary/Chest: Effort normal.   Abdominal: Bowel sounds are normal.   Skin: Skin is warm and dry.   Vitals reviewed.          Results Review:  I have reviewed the labs, radiology results, and diagnostic studies.    Laboratory Data:         Invalid input(s): LABALBU, PROT  Estimated Creatinine Clearance: 70.5 mL/min (by C-G formula based on Cr of 0.54).                    Culture Data:   No results found for: BLOODCX  No results found for: URINECX  No results found for: RESPCX  No results found for: WOUNDCX  No results found for: STOOLCX  No components found for: BODYFLD    Radiology Data:   Imaging Results (last 24 hours)     ** No results found for the last 24 hours. **          I have reviewed the patient current medications.     Assessment/Plan     Hospital  Problem List     Malignant neoplasm of hepatic flexure    Abdominal wall mass    Metastatic colon cancer in female    Nausea with vomiting    Hospice care          Plan:    1. Hospice/end of life care: Patient's pain well controlled and she is currently resting quietly.  Family reports that the patient is experiencing increased secretions.  Atropine increased to every 2 hours PRN to control secretions.       Trinidad Devine, NILESH   03/17/17   11:14 AM

## 2017-03-18 NOTE — PLAN OF CARE
Problem: Grieving (Adult)  Goal: Knowledge of Grief and Grieving  Outcome: Ongoing (interventions implemented as appropriate)    Problem: Pain, Acute (Adult)  Goal: Acceptable Pain Control/Comfort Level  Outcome: Ongoing (interventions implemented as appropriate)

## 2017-03-18 NOTE — DISCHARGE SUMMARY
[facility] Medicine Services  DEATH SUMMARY       Date of Admission: 3/3/2017  Date of Death:  3/18/2017 at approximately 0725  Primary Care Physician: Kyler Kim MD    Presenting Problem/History of Present Illness:  Hospice care [Z51.5]     Final Death Diagnoses:  Hospital Problem List     Malignant neoplasm of hepatic flexure    Abdominal wall mass    Metastatic colon cancer in female    Nausea with vomiting    Hospice care          Consults:   Consults     Date and Time Order Name Status Description    3/3/2017 1607 Hospitalist (on-call MD unless specified)      3/1/2017 1557 Inpatient Consult to Hematology & Oncology Completed           Procedures Performed:                 Pertinent Test Results:     Hospital Course:  The patient is a 77 y.o. female who was under Hospice care at [facility] related to metastatic colon cancer.  She was admitted to hospice service as an inpatient and pain and agitation were being managed with IV pain medications and Ativan for agitation.  She passed away on 3-18-17 at approximately 0725 with her family at bedside.            Trinidad Devine, NILESH  03/18/17  8:58 AM

## 2021-06-17 NOTE — PROGRESS NOTES
76-year-old female now 2 months out from a right colectomy for recurrent right colon cancer.  Patient presents because she feels a not at the top part of her incision.  His noticed this for about 10 days now and is mildly tender to palpation.  Change with Valsalva or coughing and the knot wasn't there previously.  Patient has seen Dr. Booth or medical oncology and has also been to [place] see medical oncology that there.  He was told at [place] that nothing could be done as recurrent colon cancer.    On exam she has a 3 x 2.5 cm subcutaneous mass right at the top of her epigastric incision.  No overlying skin changes and no change with Valsalva.  Abdomen is unremarkable and her incision is well-healed otherwise.    Would be concerned about a possible recurrence a specimen with the fairly quick turnaround previously.  We'll get a CT scan of the abdomen and chest and return to clinic afterwards for likely Tirso-Cut needle biopsy here in the clinic of this mass.           PT WANTING TO KNOW IF [facility] HAS PUT HER REFERRAL IN TO HAVE A BIOPSY YET.

## 2023-11-16 NOTE — PLAN OF CARE
Problem: Patient Care Overview (Adult)  Goal: Plan of Care Review  Outcome: Ongoing (interventions implemented as appropriate)    03/02/17 1034   Coping/Psychosocial Response Interventions   Plan Of Care Reviewed With patient   Outcome Evaluation   Outcome Summary/Follow up Plan Pt presents with decreased ex tolerance, transfer and gait. Pt needs PT for gait training with RW.          Problem: Inpatient Physical Therapy  Goal: Transfer Training Goal 2 LTG- PT  Outcome: Revised                      Goal: Stair Training Goal LTG- PT  Outcome: Revised                      Goal: Transfer Training Goal 2 LTG- PT  Outcome: Ongoing (interventions implemented as appropriate)    03/02/17 1034   Transfer Training 2 PT LTG   Transfer Training PT 2 LTG, Date Established 03/02/17   Transfer Training PT 2 LTG, Time to Achieve by discharge   Transfer Training PT 2 LTG, Activity Type sit to stand/stand to sit   Transfer Training PT 2 LTG, [place] Level conditional independence   Transfer Training PT 2 LTG, Assist Device walker, rolling   Transfer Training PT 2 LTG, Outcome goal revised       Goal: Gait Training Goal LTG- PT  Outcome: Ongoing (interventions implemented as appropriate)    03/02/17 1034   Gait Training PT LTG   Gait Training Goal PT LTG, Date Established 03/02/17   Gait Training Goal PT LTG, Time to Achieve by discharge   Gait Training Goal PT LTG, [place] Level conditional independence   Gait Training Goal PT LTG, Assist Device walker, rolling   Gait Training Goal PT LTG, Distance to Achieve 300       Goal: Stair Training Goal LTG- PT  Outcome: Ongoing (interventions implemented as appropriate)    03/02/17 1034   Stair Training PT LTG   Stair Training Goal PT LTG, Date Established 03/02/17   Stair Training Goal PT LTG, Time to Achieve by discharge   Stair Training Goal PT LTG, Number of Steps 3   Stair Training Goal PT LTG, [place] Level supervision required   Stair Training Goal PT LTG, Outcome goal  revised            Euthymic Anxious